# Patient Record
Sex: FEMALE | Race: ASIAN | NOT HISPANIC OR LATINO | Employment: OTHER | URBAN - METROPOLITAN AREA
[De-identification: names, ages, dates, MRNs, and addresses within clinical notes are randomized per-mention and may not be internally consistent; named-entity substitution may affect disease eponyms.]

---

## 2017-05-27 ENCOUNTER — HOSPITAL ENCOUNTER (INPATIENT)
Facility: MEDICAL CENTER | Age: 67
LOS: 11 days | DRG: 264 | End: 2017-06-07
Attending: EMERGENCY MEDICINE | Admitting: HOSPITALIST
Payer: MEDICARE

## 2017-05-27 ENCOUNTER — APPOINTMENT (OUTPATIENT)
Dept: RADIOLOGY | Facility: MEDICAL CENTER | Age: 67
DRG: 264 | End: 2017-05-27
Attending: INTERNAL MEDICINE
Payer: MEDICARE

## 2017-05-27 ENCOUNTER — APPOINTMENT (OUTPATIENT)
Dept: RADIOLOGY | Facility: MEDICAL CENTER | Age: 67
DRG: 264 | End: 2017-05-27
Attending: EMERGENCY MEDICINE
Payer: MEDICARE

## 2017-05-27 ENCOUNTER — RESOLUTE PROFESSIONAL BILLING HOSPITAL PROF FEE (OUTPATIENT)
Dept: HOSPITALIST | Facility: MEDICAL CENTER | Age: 67
End: 2017-05-27
Payer: MEDICARE

## 2017-05-27 DIAGNOSIS — I46.9 CARDIAC ARREST (HCC): ICD-10-CM

## 2017-05-27 LAB
ALBUMIN SERPL BCP-MCNC: 3.9 G/DL (ref 3.2–4.9)
ALBUMIN/GLOB SERPL: 1.1 G/DL
ALP SERPL-CCNC: 77 U/L (ref 30–99)
ALT SERPL-CCNC: 42 U/L (ref 2–50)
ANION GAP SERPL CALC-SCNC: 11 MMOL/L (ref 0–11.9)
ANION GAP SERPL CALC-SCNC: 8 MMOL/L (ref 0–11.9)
ANISOCYTOSIS BLD QL SMEAR: ABNORMAL
APTT PPP: 34.1 SEC (ref 24.7–36)
AST SERPL-CCNC: 51 U/L (ref 12–45)
BASE EXCESS BLDA CALC-SCNC: 1 MMOL/L (ref -4–3)
BASE EXCESS BLDA CALC-SCNC: 5 MMOL/L (ref -4–3)
BASOPHILS # BLD AUTO: 0 % (ref 0–1.8)
BASOPHILS # BLD: 0 K/UL (ref 0–0.12)
BILIRUB SERPL-MCNC: 0.6 MG/DL (ref 0.1–1.5)
BNP SERPL-MCNC: 988 PG/ML (ref 0–100)
BODY TEMPERATURE: ABNORMAL DEGREES
BODY TEMPERATURE: ABNORMAL DEGREES
BUN SERPL-MCNC: 22 MG/DL (ref 8–22)
BUN SERPL-MCNC: 30 MG/DL (ref 8–22)
BURR CELLS BLD QL SMEAR: NORMAL
CALCIUM SERPL-MCNC: 8.7 MG/DL (ref 8.5–10.5)
CALCIUM SERPL-MCNC: 9.4 MG/DL (ref 8.5–10.5)
CHLORIDE SERPL-SCNC: 102 MMOL/L (ref 96–112)
CHLORIDE SERPL-SCNC: 99 MMOL/L (ref 96–112)
CO2 BLDA-SCNC: 29 MMOL/L (ref 20–33)
CO2 BLDA-SCNC: 31 MMOL/L (ref 20–33)
CO2 SERPL-SCNC: 28 MMOL/L (ref 20–33)
CO2 SERPL-SCNC: 28 MMOL/L (ref 20–33)
CREAT SERPL-MCNC: 4.5 MG/DL (ref 0.5–1.4)
CREAT SERPL-MCNC: 4.74 MG/DL (ref 0.5–1.4)
DACRYOCYTES BLD QL SMEAR: NORMAL
EOSINOPHIL # BLD AUTO: 0.09 K/UL (ref 0–0.51)
EOSINOPHIL NFR BLD: 0.9 % (ref 0–6.9)
ERYTHROCYTE [DISTWIDTH] IN BLOOD BY AUTOMATED COUNT: 61.6 FL (ref 35.9–50)
GFR SERPL CREATININE-BSD FRML MDRD: 10 ML/MIN/1.73 M 2
GFR SERPL CREATININE-BSD FRML MDRD: 9 ML/MIN/1.73 M 2
GLOBULIN SER CALC-MCNC: 3.6 G/DL (ref 1.9–3.5)
GLUCOSE BLD-MCNC: 106 MG/DL (ref 65–99)
GLUCOSE BLD-MCNC: 134 MG/DL (ref 65–99)
GLUCOSE BLD-MCNC: 141 MG/DL (ref 65–99)
GLUCOSE SERPL-MCNC: 133 MG/DL (ref 65–99)
GLUCOSE SERPL-MCNC: 139 MG/DL (ref 65–99)
HAV IGM SERPL QL IA: NEGATIVE
HBV CORE IGM SER QL: NEGATIVE
HBV SURFACE AB SERPL IA-ACNC: 35.07 MIU/ML (ref 0–10)
HBV SURFACE AG SER QL: NEGATIVE
HCO3 BLDA-SCNC: 27.8 MMOL/L (ref 17–25)
HCO3 BLDA-SCNC: 29.6 MMOL/L (ref 17–25)
HCT VFR BLD AUTO: 36.2 % (ref 37–47)
HCV AB SER QL: NEGATIVE
HGB BLD-MCNC: 10.7 G/DL (ref 12–16)
INR PPP: 1.2 (ref 0.87–1.13)
LIPASE SERPL-CCNC: 36 U/L (ref 11–82)
LV EJECT FRACT  99904: 35
LV EJECT FRACT MOD 2C 99903: 35.67
LV EJECT FRACT MOD 4C 99902: 31.77
LV EJECT FRACT MOD BP 99901: 29.5
LYMPHOCYTES # BLD AUTO: 5.02 K/UL (ref 1–4.8)
LYMPHOCYTES NFR BLD: 47.8 % (ref 22–41)
MACROCYTES BLD QL SMEAR: ABNORMAL
MAGNESIUM SERPL-MCNC: 2.5 MG/DL (ref 1.5–2.5)
MANUAL DIFF BLD: NORMAL
MCH RBC QN AUTO: 27.7 PG (ref 27–33)
MCHC RBC AUTO-ENTMCNC: 29.6 G/DL (ref 33.6–35)
MCV RBC AUTO: 93.8 FL (ref 81.4–97.8)
MONOCYTES # BLD AUTO: 0.65 K/UL (ref 0–0.85)
MONOCYTES NFR BLD AUTO: 6.2 % (ref 0–13.4)
MORPHOLOGY BLD-IMP: NORMAL
MYELOCYTES NFR BLD MANUAL: 2.6 %
NEUTROPHILS # BLD AUTO: 4.46 K/UL (ref 2–7.15)
NEUTROPHILS NFR BLD: 40.7 % (ref 44–72)
NEUTS BAND NFR BLD MANUAL: 1.8 % (ref 0–10)
NRBC # BLD AUTO: 0.1 K/UL
NRBC BLD AUTO-RTO: 1 /100 WBC
O2/TOTAL GAS SETTING VFR VENT: 100 %
O2/TOTAL GAS SETTING VFR VENT: 40 %
OVALOCYTES BLD QL SMEAR: NORMAL
PCO2 BLDA: 43.1 MMHG (ref 26–37)
PCO2 BLDA: 55.8 MMHG (ref 26–37)
PCO2 TEMP ADJ BLDA: 43.1 MMHG (ref 26–37)
PH BLDA: 7.3 [PH] (ref 7.4–7.5)
PH BLDA: 7.45 [PH] (ref 7.4–7.5)
PH TEMP ADJ BLDA: 7.45 [PH] (ref 7.4–7.5)
PLATELET # BLD AUTO: 92 K/UL (ref 164–446)
PLATELET BLD QL SMEAR: NORMAL
PMV BLD AUTO: 11.5 FL (ref 9–12.9)
PO2 BLDA: 340 MMHG (ref 64–87)
PO2 BLDA: 60 MMHG (ref 64–87)
PO2 TEMP ADJ BLDA: 60 MMHG (ref 64–87)
POIKILOCYTOSIS BLD QL SMEAR: NORMAL
POTASSIUM SERPL-SCNC: 4.2 MMOL/L (ref 3.6–5.5)
POTASSIUM SERPL-SCNC: 4.3 MMOL/L (ref 3.6–5.5)
PROT SERPL-MCNC: 7.5 G/DL (ref 6–8.2)
PROTHROMBIN TIME: 15.6 SEC (ref 12–14.6)
RBC # BLD AUTO: 3.86 M/UL (ref 4.2–5.4)
RBC BLD AUTO: PRESENT
SAO2 % BLDA: 100 % (ref 93–99)
SAO2 % BLDA: 91 % (ref 93–99)
SCHISTOCYTES BLD QL SMEAR: NORMAL
SODIUM SERPL-SCNC: 138 MMOL/L (ref 135–145)
SODIUM SERPL-SCNC: 138 MMOL/L (ref 135–145)
SPECIMEN DRAWN FROM PATIENT: ABNORMAL
SPECIMEN DRAWN FROM PATIENT: ABNORMAL
T4 FREE SERPL-MCNC: 0.9 NG/DL (ref 0.53–1.43)
TROPONIN I SERPL-MCNC: 0.05 NG/ML (ref 0–0.04)
TROPONIN I SERPL-MCNC: 0.61 NG/ML (ref 0–0.04)
TROPONIN I SERPL-MCNC: 0.93 NG/ML (ref 0–0.04)
TROPONIN I SERPL-MCNC: 1.67 NG/ML (ref 0–0.04)
WBC # BLD AUTO: 10.5 K/UL (ref 4.8–10.8)

## 2017-05-27 PROCEDURE — 82803 BLOOD GASES ANY COMBINATION: CPT

## 2017-05-27 PROCEDURE — 93306 TTE W/DOPPLER COMPLETE: CPT | Mod: 26 | Performed by: INTERNAL MEDICINE

## 2017-05-27 PROCEDURE — 96365 THER/PROPH/DIAG IV INF INIT: CPT

## 2017-05-27 PROCEDURE — 83735 ASSAY OF MAGNESIUM: CPT

## 2017-05-27 PROCEDURE — 84439 ASSAY OF FREE THYROXINE: CPT

## 2017-05-27 PROCEDURE — 94760 N-INVAS EAR/PLS OXIMETRY 1: CPT

## 2017-05-27 PROCEDURE — 94002 VENT MGMT INPAT INIT DAY: CPT

## 2017-05-27 PROCEDURE — C1751 CATH, INF, PER/CENT/MIDLINE: HCPCS

## 2017-05-27 PROCEDURE — 770022 HCHG ROOM/CARE - ICU (200)

## 2017-05-27 PROCEDURE — 90935 HEMODIALYSIS ONE EVALUATION: CPT

## 2017-05-27 PROCEDURE — 302214 INTUBATION BOX: Performed by: EMERGENCY MEDICINE

## 2017-05-27 PROCEDURE — 700105 HCHG RX REV CODE 258: Performed by: INTERNAL MEDICINE

## 2017-05-27 PROCEDURE — 93005 ELECTROCARDIOGRAM TRACING: CPT | Performed by: EMERGENCY MEDICINE

## 2017-05-27 PROCEDURE — 80061 LIPID PANEL: CPT

## 2017-05-27 PROCEDURE — 80053 COMPREHEN METABOLIC PANEL: CPT

## 2017-05-27 PROCEDURE — 83880 ASSAY OF NATRIURETIC PEPTIDE: CPT

## 2017-05-27 PROCEDURE — 36556 INSERT NON-TUNNEL CV CATH: CPT

## 2017-05-27 PROCEDURE — 99292 CRITICAL CARE ADDL 30 MIN: CPT | Mod: 25 | Performed by: INTERNAL MEDICINE

## 2017-05-27 PROCEDURE — 85610 PROTHROMBIN TIME: CPT

## 2017-05-27 PROCEDURE — 700111 HCHG RX REV CODE 636 W/ 250 OVERRIDE (IP): Performed by: INTERNAL MEDICINE

## 2017-05-27 PROCEDURE — 96366 THER/PROPH/DIAG IV INF ADDON: CPT

## 2017-05-27 PROCEDURE — 5A1D60Z PERFORMANCE OF URINARY FILTRATION, MULTIPLE: ICD-10-PCS | Performed by: INTERNAL MEDICINE

## 2017-05-27 PROCEDURE — 02HV33Z INSERTION OF INFUSION DEVICE INTO SUPERIOR VENA CAVA, PERCUTANEOUS APPROACH: ICD-10-PCS | Performed by: INTERNAL MEDICINE

## 2017-05-27 PROCEDURE — 83690 ASSAY OF LIPASE: CPT

## 2017-05-27 PROCEDURE — 700111 HCHG RX REV CODE 636 W/ 250 OVERRIDE (IP)

## 2017-05-27 PROCEDURE — 85007 BL SMEAR W/DIFF WBC COUNT: CPT

## 2017-05-27 PROCEDURE — 304538 HCHG NG TUBE

## 2017-05-27 PROCEDURE — 93306 TTE W/DOPPLER COMPLETE: CPT

## 2017-05-27 PROCEDURE — 71010 DX-CHEST-PORTABLE (1 VIEW): CPT

## 2017-05-27 PROCEDURE — 85730 THROMBOPLASTIN TIME PARTIAL: CPT

## 2017-05-27 PROCEDURE — C1751 CATH, INF, PER/CENT/MIDLINE: HCPCS | Performed by: INTERNAL MEDICINE

## 2017-05-27 PROCEDURE — 85027 COMPLETE CBC AUTOMATED: CPT

## 2017-05-27 PROCEDURE — 82962 GLUCOSE BLOOD TEST: CPT | Mod: 91

## 2017-05-27 PROCEDURE — 99223 1ST HOSP IP/OBS HIGH 75: CPT | Performed by: HOSPITALIST

## 2017-05-27 PROCEDURE — 5A1955Z RESPIRATORY VENTILATION, GREATER THAN 96 CONSECUTIVE HOURS: ICD-10-PCS | Performed by: EMERGENCY MEDICINE

## 2017-05-27 PROCEDURE — 80048 BASIC METABOLIC PNL TOTAL CA: CPT

## 2017-05-27 PROCEDURE — 36556 INSERT NON-TUNNEL CV CATH: CPT | Performed by: INTERNAL MEDICINE

## 2017-05-27 PROCEDURE — 86706 HEP B SURFACE ANTIBODY: CPT

## 2017-05-27 PROCEDURE — 303105 HCHG CATHETER EXTRA

## 2017-05-27 PROCEDURE — 70450 CT HEAD/BRAIN W/O DYE: CPT

## 2017-05-27 PROCEDURE — 80074 ACUTE HEPATITIS PANEL: CPT

## 2017-05-27 PROCEDURE — 31500 INSERT EMERGENCY AIRWAY: CPT

## 2017-05-27 PROCEDURE — 0BH18EZ INSERTION OF ENDOTRACHEAL AIRWAY INTO TRACHEA, VIA NATURAL OR ARTIFICIAL OPENING ENDOSCOPIC: ICD-10-PCS | Performed by: EMERGENCY MEDICINE

## 2017-05-27 PROCEDURE — 99291 CRITICAL CARE FIRST HOUR: CPT

## 2017-05-27 PROCEDURE — 84484 ASSAY OF TROPONIN QUANT: CPT

## 2017-05-27 PROCEDURE — 51702 INSERT TEMP BLADDER CATH: CPT

## 2017-05-27 PROCEDURE — 700105 HCHG RX REV CODE 258: Performed by: EMERGENCY MEDICINE

## 2017-05-27 PROCEDURE — 99291 CRITICAL CARE FIRST HOUR: CPT | Mod: 25 | Performed by: INTERNAL MEDICINE

## 2017-05-27 PROCEDURE — 36600 WITHDRAWAL OF ARTERIAL BLOOD: CPT

## 2017-05-27 PROCEDURE — B548ZZA ULTRASONOGRAPHY OF SUPERIOR VENA CAVA, GUIDANCE: ICD-10-PCS | Performed by: INTERNAL MEDICINE

## 2017-05-27 PROCEDURE — A9270 NON-COVERED ITEM OR SERVICE: HCPCS | Performed by: INTERNAL MEDICINE

## 2017-05-27 PROCEDURE — 700102 HCHG RX REV CODE 250 W/ 637 OVERRIDE(OP): Performed by: INTERNAL MEDICINE

## 2017-05-27 RX ORDER — LABETALOL HYDROCHLORIDE 5 MG/ML
10 INJECTION, SOLUTION INTRAVENOUS EVERY 4 HOURS PRN
Status: DISCONTINUED | OUTPATIENT
Start: 2017-05-27 | End: 2017-06-07 | Stop reason: HOSPADM

## 2017-05-27 RX ORDER — AMOXICILLIN 250 MG
2 CAPSULE ORAL 2 TIMES DAILY
Status: DISCONTINUED | OUTPATIENT
Start: 2017-05-27 | End: 2017-05-27

## 2017-05-27 RX ORDER — ALBUMIN (HUMAN) 12.5 G/50ML
12.5 SOLUTION INTRAVENOUS
Status: DISCONTINUED | OUTPATIENT
Start: 2017-05-27 | End: 2017-05-30

## 2017-05-27 RX ORDER — LIDOCAINE HYDROCHLORIDE 10 MG/ML
1-2 INJECTION, SOLUTION INFILTRATION; PERINEURAL
Status: DISCONTINUED | OUTPATIENT
Start: 2017-05-27 | End: 2017-06-07 | Stop reason: HOSPADM

## 2017-05-27 RX ORDER — AMOXICILLIN 250 MG
2 CAPSULE ORAL 2 TIMES DAILY
Status: DISCONTINUED | OUTPATIENT
Start: 2017-05-27 | End: 2017-06-07 | Stop reason: HOSPADM

## 2017-05-27 RX ORDER — CHLORHEXIDINE GLUCONATE ORAL RINSE 1.2 MG/ML
15 SOLUTION DENTAL 2 TIMES DAILY
Status: DISCONTINUED | OUTPATIENT
Start: 2017-05-27 | End: 2017-06-02

## 2017-05-27 RX ORDER — SODIUM CHLORIDE 9 MG/ML
INJECTION, SOLUTION INTRAVENOUS CONTINUOUS
Status: DISCONTINUED | OUTPATIENT
Start: 2017-05-27 | End: 2017-06-07 | Stop reason: HOSPADM

## 2017-05-27 RX ORDER — PRAVASTATIN SODIUM 40 MG
40 TABLET ORAL NIGHTLY
COMMUNITY

## 2017-05-27 RX ORDER — HEPARIN SODIUM 5000 [USP'U]/ML
5000 INJECTION, SOLUTION INTRAVENOUS; SUBCUTANEOUS EVERY 8 HOURS
Status: DISCONTINUED | OUTPATIENT
Start: 2017-05-27 | End: 2017-05-27

## 2017-05-27 RX ORDER — ENALAPRILAT 1.25 MG/ML
1.25 INJECTION INTRAVENOUS EVERY 6 HOURS PRN
Status: DISCONTINUED | OUTPATIENT
Start: 2017-05-27 | End: 2017-06-07 | Stop reason: HOSPADM

## 2017-05-27 RX ORDER — OMEPRAZOLE 20 MG/1
20 CAPSULE, DELAYED RELEASE ORAL DAILY
COMMUNITY

## 2017-05-27 RX ORDER — DEXTROSE MONOHYDRATE 25 G/50ML
25 INJECTION, SOLUTION INTRAVENOUS
Status: DISCONTINUED | OUTPATIENT
Start: 2017-05-27 | End: 2017-05-27

## 2017-05-27 RX ORDER — SEVELAMER HYDROCHLORIDE 800 MG/1
800 TABLET, FILM COATED ORAL
COMMUNITY

## 2017-05-27 RX ORDER — BISACODYL 10 MG
10 SUPPOSITORY, RECTAL RECTAL
Status: DISCONTINUED | OUTPATIENT
Start: 2017-05-27 | End: 2017-06-07 | Stop reason: HOSPADM

## 2017-05-27 RX ORDER — FAMOTIDINE 20 MG/1
20 TABLET, FILM COATED ORAL DAILY
Status: DISCONTINUED | OUTPATIENT
Start: 2017-05-27 | End: 2017-06-03

## 2017-05-27 RX ORDER — FUROSEMIDE 40 MG/1
40 TABLET ORAL DAILY
COMMUNITY

## 2017-05-27 RX ORDER — AMLODIPINE BESYLATE 5 MG/1
5 TABLET ORAL
COMMUNITY

## 2017-05-27 RX ORDER — SODIUM CHLORIDE 9 MG/ML
1000 INJECTION, SOLUTION INTRAVENOUS ONCE
Status: COMPLETED | OUTPATIENT
Start: 2017-05-27 | End: 2017-05-27

## 2017-05-27 RX ORDER — HEPARIN SODIUM 5000 [USP'U]/ML
5000 INJECTION, SOLUTION INTRAVENOUS; SUBCUTANEOUS EVERY 8 HOURS
Status: DISCONTINUED | OUTPATIENT
Start: 2017-05-27 | End: 2017-05-29

## 2017-05-27 RX ORDER — MULTIVIT-MIN/IRON/FOLIC ACID/K 18-600-40
1 CAPSULE ORAL DAILY
COMMUNITY

## 2017-05-27 RX ORDER — IPRATROPIUM BROMIDE AND ALBUTEROL SULFATE 2.5; .5 MG/3ML; MG/3ML
3 SOLUTION RESPIRATORY (INHALATION)
Status: DISCONTINUED | OUTPATIENT
Start: 2017-05-27 | End: 2017-06-07 | Stop reason: HOSPADM

## 2017-05-27 RX ORDER — ONDANSETRON 4 MG/1
4 TABLET, ORALLY DISINTEGRATING ORAL EVERY 4 HOURS PRN
Status: DISCONTINUED | OUTPATIENT
Start: 2017-05-27 | End: 2017-06-07 | Stop reason: HOSPADM

## 2017-05-27 RX ORDER — DEXTROSE MONOHYDRATE 25 G/50ML
25 INJECTION, SOLUTION INTRAVENOUS
Status: DISCONTINUED | OUTPATIENT
Start: 2017-05-27 | End: 2017-06-07 | Stop reason: HOSPADM

## 2017-05-27 RX ORDER — ACETAMINOPHEN 325 MG/1
650 TABLET ORAL EVERY 6 HOURS PRN
Status: DISCONTINUED | OUTPATIENT
Start: 2017-05-27 | End: 2017-06-07 | Stop reason: HOSPADM

## 2017-05-27 RX ORDER — POLYETHYLENE GLYCOL 3350 17 G/17G
1 POWDER, FOR SOLUTION ORAL
Status: DISCONTINUED | OUTPATIENT
Start: 2017-05-27 | End: 2017-05-27

## 2017-05-27 RX ORDER — ONDANSETRON 2 MG/ML
4 INJECTION INTRAMUSCULAR; INTRAVENOUS EVERY 4 HOURS PRN
Status: DISCONTINUED | OUTPATIENT
Start: 2017-05-27 | End: 2017-06-07 | Stop reason: HOSPADM

## 2017-05-27 RX ORDER — POLYETHYLENE GLYCOL 3350 17 G/17G
1 POWDER, FOR SOLUTION ORAL
Status: DISCONTINUED | OUTPATIENT
Start: 2017-05-27 | End: 2017-06-07 | Stop reason: HOSPADM

## 2017-05-27 RX ORDER — INSULIN GLARGINE 100 [IU]/ML
29 INJECTION, SOLUTION SUBCUTANEOUS EVERY MORNING
COMMUNITY

## 2017-05-27 RX ORDER — CARVEDILOL 12.5 MG/1
12.5 TABLET ORAL 2 TIMES DAILY WITH MEALS
COMMUNITY

## 2017-05-27 RX ORDER — BISACODYL 10 MG
10 SUPPOSITORY, RECTAL RECTAL
Status: DISCONTINUED | OUTPATIENT
Start: 2017-05-27 | End: 2017-05-27

## 2017-05-27 RX ADMIN — HEPARIN SODIUM 5000 UNITS: 5000 INJECTION, SOLUTION INTRAVENOUS; SUBCUTANEOUS at 21:35

## 2017-05-27 RX ADMIN — FENTANYL CITRATE 50 MCG: 50 INJECTION, SOLUTION INTRAMUSCULAR; INTRAVENOUS at 06:37

## 2017-05-27 RX ADMIN — SODIUM CHLORIDE 1000 ML: 9 INJECTION, SOLUTION INTRAVENOUS at 01:35

## 2017-05-27 RX ADMIN — CHLORHEXIDINE GLUCONATE 15 ML: 1.2 RINSE ORAL at 07:50

## 2017-05-27 RX ADMIN — CHLORHEXIDINE GLUCONATE 15 ML: 1.2 RINSE ORAL at 21:31

## 2017-05-27 RX ADMIN — SODIUM CHLORIDE: 9 INJECTION, SOLUTION INTRAVENOUS at 03:54

## 2017-05-27 RX ADMIN — PROPOFOL 35 MCG/KG/MIN: 10 INJECTION, EMULSION INTRAVENOUS at 07:51

## 2017-05-27 RX ADMIN — PROPOFOL 10 MCG/KG/MIN: 10 INJECTION, EMULSION INTRAVENOUS at 01:30

## 2017-05-27 RX ADMIN — HEPARIN SODIUM 5000 UNITS: 5000 INJECTION, SOLUTION INTRAVENOUS; SUBCUTANEOUS at 15:28

## 2017-05-27 RX ADMIN — HEPARIN SODIUM 5000 UNITS: 5000 INJECTION, SOLUTION INTRAVENOUS; SUBCUTANEOUS at 06:20

## 2017-05-27 RX ADMIN — FENTANYL CITRATE 50 MCG: 50 INJECTION, SOLUTION INTRAMUSCULAR; INTRAVENOUS at 08:09

## 2017-05-27 RX ADMIN — PROPOFOL 30 MCG/KG/MIN: 10 INJECTION, EMULSION INTRAVENOUS at 15:28

## 2017-05-27 RX ADMIN — PROPOFOL 30 MCG/KG/MIN: 10 INJECTION, EMULSION INTRAVENOUS at 23:15

## 2017-05-27 RX ADMIN — FENTANYL CITRATE 100 MCG: 50 INJECTION, SOLUTION INTRAMUSCULAR; INTRAVENOUS at 21:01

## 2017-05-27 ASSESSMENT — ENCOUNTER SYMPTOMS: LOSS OF CONSCIOUSNESS: 1

## 2017-05-27 ASSESSMENT — LIFESTYLE VARIABLES
EVER_SMOKED: NEVER
REASON UNABLE TO ASSESS: INTUBATED AND SEDATED
ALCOHOL_USE: NO
DO YOU DRINK ALCOHOL: NO

## 2017-05-27 NOTE — DIETARY
"Nutrition Support (cortrak) Assessment - Female    Tyler Thompson is a 66 y.o. female with admitting DX of Cardiac arrest  Per chart review: pt noted with ESRD and on HD 3x/wk for the past 7 years   No past surgical history on file.    Allergies: Pcn  Height: 157.5 cm (5' 2\")  Weight: 71.8 kg (158 lb 4.6 oz)  Weight to Use in Calculations: 71.8 kg (158 lb 4.6 oz)  Ideal Body Weight: 49.896 kg (110 lb)  Percent Ideal Body Weight: 143.9  Body mass index is 28.94 kg/(m^2).    Pertinent Labs: Gluc 139, BUN 22, creat 4.5, K/Phos/Mg WNL  Pertinent Medications: Pepcid, Albumin (prn w/HD) Heparin, Insulin, Propofol (currenlty at 13.1 mL/hr = 345 kcals); bowel meds prn   Pertinent Fluids: IVF NS at 10 mL/hr cont   Skin: no skin breakdown noted at this time     Estimated Needs: MSJ x 1.2 = 1443 kcals; PSU 2003b = 1130 kcals (Ve: 5.1, Tmax/24 hours = 36.1C)   Total Calories / day: 1075 - 1440 (Calories / kg: 15 - 20)  Total Grams Protein / day: 86.3 - 100 (Grams Protein / k.2 - 1.4)  Total Fluids ml / day: 1798.2 ml        Assessment / Evaluation: Pt admitted with cardiac arrest, she is currently intubated and sedated. Received orders for nutrition support to be initiated. While pt is on propofol, will provide specialized TF formula, Peptamen Intense VHP, to help provide adequate kcals and protein.  Once pt is no longer on propofol, will change TF formula to Replete with Fiber as TF meets estimated needs.     Plan / Recommendation:   · Once cortrak is placed and verified, initiate nutrition support:   · ON propofol: Provide Peptamen Intense VHP at 25 mL/hr and advance per protocol to goal rate of 40 mL/hr .  TF at goal will provide 960 kcals (+kcals from prop), 90 gm protein and 806 mL free water per day   · OFF propofol: Change TF to Replete with Fiber and advance as pt is able to goal rate of 60 mL/hr.  TF at goal will provide 1440 kcals, 90 gm protein, 1210 mL free water, 1525 mg Na, 1440 mg Phos and 2160 mg K+    · Fluids " per MD   · Monitor wt and lab trends   · RD will con't to monitor sedation and TF tolerance and will adjust TF as necessary     RD following

## 2017-05-27 NOTE — IP AVS SNAPSHOT
" Home Care Instructions                                                                                                                  Name:Tyler Thmopson  Medical Record Number:[id number]  CSN: [id number]    YOB: 1950   Age: 66 y.o.  Sex: female  HT:1.575 m (5' 2\") WT: 54 kg (119 lb 0.8 oz)          Admit Date: 5/27/2017     Discharge Date:   Today's Date: 6/7/2017  Attending Doctor:  Ricardo Castaneda M.D.                  Allergies:  Pcn            Discharge Instructions       Discharge Instructions    Discharged to other by ambulance with escort. Discharged via ambulance, hospital escort: Yes.  Special equipment needed: Oxygen    Be sure to schedule a follow-up appointment with your primary care doctor or any specialists as instructed.     Discharge Plan:   Diet Plan: Discussed  Activity Level: Discussed  Confirmed Follow up Appointment:  (discharge to LT[facility])  Confirmed Symptoms Management: Discussed  Medication Reconciliation Updated: Yes  Influenza Vaccine Indication: Indicated: Not available from distributor/, Patient Refuses    I understand that a diet low in cholesterol, fat, and sodium is recommended for good health. Unless I have been given specific instructions below for another diet, I accept this instruction as my diet prescription.   Other diet: tube feed/ to be followed up on    Special Instructions:   HF Patient Discharge Instructions  · Monitor your weight daily, and maintain a weight chart, to track your weight changes.   · Activity as tolerated, unless your Doctor has ordered otherwise. Other activity order: as tolerated.  · Follow a low fat, low cholesterol, low salt diet unless instructed otherwise by your Doctor. Read the labels on the back of food products and track your intake of fat, cholesterol and salt.   · Fluid Restriction No. If a Fluid Restriction has been ordered by your Doctor, measure fluids with a measuring cup to ensure that you are not exceeding the restriction. "   · No smoking.  · Oxygen Yes. If your Doctor has ordered that you wear Oxygen at home, it is important to wear it as ordered.  · Did you receive an explanation from staff on the importance of taking each of your medications and why it is necessary to keep taking them unless your doctor says to stop?   · Were all of your questions answered about how to manage your heart failure and what to do if you have increased signs and symptoms after you go home?   · Do you feel like your heart failure care team involved you in the care treatment plan and allowed you to make decisions regarding your care while in the hospital and addressed any discharge needs you might have?     See the educational handout provided at discharge for more information on monitoring your daily weight, activity and diet. This also explains more about Heart Failure, symptoms of a flare-up and some of the tests that you have undergone.     Warning Signs of a Flare-Up include:  · Swelling in the ankles or lower legs.  · Shortness of breath, while at rest, or while doing normal activities.   · Shortness of breath at night when in bed, or coughing in bed.   · Requiring more pillows to sleep at night, or needing to sit up at night to sleep.  · Feeling weak, dizzy or fatigued.     When to call your Doctor:  · [facility] week from 8:00 a.m. to 8:00 p.m. for medical questions [phone].  · Call your Primary Care Physician or Cardiologist if:   1. You experience any pain radiating to your jaw or neck.  2. You have any difficulty breathing.  3. You experience weight gain of 3 lbs in a day or 5 lbs in a week.   4. You feel any palpitations or irregular heartbeats.  5. You become dizzy or lose consciousness.   If you have had an angiogram or had a pacemaker or AICD placed, and experience:  1. Bleeding, drainage or swelling at the surgical / puncture site.  2. Fever greater than 100.0 F  3. Shock from internal  "defibrillator.  4. Cool and / or numb extremities.      · Is patient discharged on Warfarin / Coumadin?   Yes    You are receiving the drug warfarin. Please understand the importance of monitoring warfarin with scheduled PT/INR blood draws.  Follow-up with LTAC .    IMPORTANT: HOW TO USE THIS INFORMATION:  This is a summary and does NOT have all possible information about this product. This information does not assure that this product is safe, effective, or appropriate for you. This information is not individual medical advice and does not substitute for the advice of your health care professional. Always ask your health care professional for complete information about this product and your specific health needs.      WARFARIN - ORAL (WARF-uh-rin)      COMMON BRAND NAME(S): Coumadin      WARNING:  Warfarin can cause very serious (possibly fatal) bleeding. This is more likely to occur when you first start taking this medication or if you take too much warfarin. To decrease your risk for bleeding, your doctor or other health care provider will monitor you closely and check your lab results (INR test) to make sure you are not taking too much warfarin. Keep all medical and laboratory appointments. Tell your doctor right away if you notice any signs of serious bleeding. See also Side Effects section.      USES:  This medication is used to treat blood clots (such as in deep vein thrombosis-DVT or pulmonary embolus-PE) and/or to prevent new clots from forming in your body. Preventing harmful blood clots helps to reduce the risk of a stroke or heart attack. Conditions that increase your risk of developing blood clots include a certain type of irregular heart rhythm (atrial fibrillation), heart valve replacement, recent heart attack, and certain surgeries (such as hip/knee replacement). Warfarin is commonly called a \"blood thinner,\" but the more correct term is \"anticoagulant.\" It helps to keep blood flowing smoothly in " your body by decreasing the amount of certain substances (clotting proteins) in your blood.      HOW TO USE:  Read the Medication Guide provided by your pharmacist before you start taking warfarin and each time you get a refill. If you have any questions, ask your doctor or pharmacist. Take this medication by mouth with or without food as directed by your doctor or other health care professional, usually once a day. It is very important to take it exactly as directed. Do not increase the dose, take it more frequently, or stop using it unless directed by your doctor. Dosage is based on your medical condition, laboratory tests (such as INR), and response to treatment. Your doctor or other health care provider will monitor you closely while you are taking this medication to determine the right dose for you. Use this medication regularly to get the most benefit from it. To help you remember, take it at the same time each day. It is important to eat a balanced, consistent diet while taking warfarin. Some foods can affect how warfarin works in your body and may affect your treatment and dose. Avoid sudden large increases or decreases in your intake of foods high in vitamin K (such as broccoli, cauliflower, cabbage, brussels sprouts, kale, spinach, and other green leafy vegetables, liver, green tea, certain vitamin supplements). If you are trying to lose weight, check with your doctor before you try to go on a diet. Cranberry products may also affect how your warfarin works. Limit the amount of cranberry juice (16 ounces/480 milliliters a day) or other cranberry products you may drink or eat.      SIDE EFFECTS:  Nausea, loss of appetite, or stomach/abdominal pain may occur. If any of these effects persist or worsen, tell your doctor or pharmacist promptly. Remember that your doctor has prescribed this medication because he or she has judged that the benefit to you is greater than the risk of side effects. Many people using  this medication do not have serious side effects. This medication can cause serious bleeding if it affects your blood clotting proteins too much (shown by unusually high INR lab results). Even if your doctor stops your medication, this risk of bleeding can continue for up to a week. Tell your doctor right away if you have any signs of serious bleeding, including: unusual pain/swelling/discomfort, unusual/easy bruising, prolonged bleeding from cuts or gums, persistent/frequent nosebleeds, unusually heavy/prolonged menstrual flow, pink/dark urine, coughing up blood, vomit that is bloody or looks like coffee grounds, severe headache, dizziness/fainting, unusual or persistent tiredness/weakness, bloody/black/tarry stools, chest pain, shortness of breath, difficulty swallowing. Tell your doctor right away if any of these unlikely but serious side effects occur: persistent nausea/vomiting, severe stomach/abdominal pain, yellowing eyes/skin. This drug rarely has caused very serious (possibly fatal) problems if its effects lead to small blood clots (usually at the beginning of treatment). This can lead to severe skin/tissue damage that may require surgery or amputation if left untreated. Patients with certain blood conditions (protein C or S deficiency) may be at greater risk. Get medical help right away if any of these rare but serious side effects occur: painful/red/purplish patches on the skin (such as on the toe, breast, abdomen), change in the amount of urine, vision changes, confusion, slurred speech, weakness on one side of the body. A very serious allergic reaction to this drug is rare. However, get medical help right away if you notice any symptoms of a serious allergic reaction, including: rash, itching/swelling (especially of the face/tongue/throat), severe dizziness, trouble breathing. This is not a complete list of possible side effects. If you notice other effects not listed above, contact your doctor or  pharmacist. In the [place] - Call your doctor for medical advice about side effects. You may report side effects to FDA at 0-808-JIQ-4759. In [place] - Call your doctor for medical advice about side effects. You may report side effects to [facility] [place] at [phone].      PRECAUTIONS:  Before taking warfarin, tell your doctor or pharmacist if you are allergic to it; or if you have any other allergies. This product may contain inactive ingredients, which can cause allergic reactions or other problems. Talk to your pharmacist for more details. Before using this medication, tell your doctor or pharmacist your medical history, especially of: blood disorders (such as anemia, hemophilia), bleeding problems (such as bleeding of the stomach/intestines, bleeding in the brain), blood vessel disorders (such as aneurysms), recent major injury/surgery, liver disease, alcohol use, mental/mood disorders (including memory problems), frequent falls/injuries. It is important that all your doctors and dentists know that you take warfarin. Before having surgery or any medical/dental procedures, tell your doctor or dentist that you are taking this medication and about all the products you use (including prescription drugs, nonprescription drugs, and herbal products). Avoid getting injections into the muscles. If you must have an injection into a muscle (for example, a flu shot), it should be given in the arm. This way, it will be easier to check for bleeding and/or apply pressure bandages. This medication may cause stomach bleeding. Daily use of alcohol while using this medicine will increase your risk for stomach bleeding and may also affect how this medication works. Limit or avoid alcoholic beverages. If you have not been eating well, if you have an illness or infection that causes fever, vomiting, or diarrhea for more than 2 days, or if you start using any antibiotic medications, contact your doctor or pharmacist immediately because  these conditions can affect how warfarin works. This medication can cause heavy bleeding. To lower the chance of getting cut, bruised, or injured, use great caution with sharp objects like safety razors and nail cutters. Use an electric razor when shaving and a soft toothbrush when brushing your teeth. Avoid activities such as contact sports. If you fall or injure yourself, especially if you hit your head, call your doctor immediately. Your doctor may need to check you. The Food & Drug Administration has stated that generic warfarin products are interchangeable. However, consult your doctor or pharmacist before switching warfarin products. Be careful not to take more than one medication that contains warfarin unless specifically directed by the doctor or health care provider who is monitoring your warfarin treatment. Older adults may be at greater risk for bleeding while using this drug. This medication is not recommended for use during pregnancy because of serious (possibly fatal) harm to an unborn baby. Discuss the use of reliable forms of birth control with your doctor. If you become pregnant or think you may be pregnant, tell your doctor immediately. If you are planning pregnancy, discuss a plan for managing your condition with your doctor before you become pregnant. Your doctor may switch the type of medication you use during pregnancy. Very small amounts of this medication may pass into breast milk but is unlikely to harm a nursing infant. Consult your doctor before breast-feeding.      DRUG INTERACTIONS:  Drug interactions may change how your medications work or increase your risk for serious side effects. This document does not contain all possible drug interactions. Keep a list of all the products you use (including prescription/nonprescription drugs and herbal products) and share it with your doctor and pharmacist. Do not start, stop, or change the dosage of any medicines without your doctor's approval.  "Warfarin interacts with many prescription, nonprescription, vitamin, and herbal products. This includes medications that are applied to the skin or inside the vagina or rectum. The interactions with warfarin usually result in an increase or decrease in the \"blood-thinning\" (anticoagulant) effect. Your doctor or other health care professional should closely monitor you to prevent serious bleeding or clotting problems. While taking warfarin, it is very important to tell your doctor or pharmacist of any changes in medications, vitamins, or herbal products that you are taking. Some products that may interact with this drug include: capecitabine, imatinib, mifepristone. Aspirin, aspirin-like drugs (salicylates), and nonsteroidal anti-inflammatory drugs (NSAIDs such as ibuprofen, naproxen, celecoxib) may have effects similar to warfarin. These drugs may increase the risk of bleeding problems if taken during treatment with warfarin. Carefully check all prescription/nonprescription product labels (including drugs applied to the skin such as pain-relieving creams) since the products may contain NSAIDs or salicylates. Talk to your doctor about using a different medication (such as acetaminophen) to treat pain/fever. Low-dose aspirin and related drugs (such as clopidogrel, ticlopidine) should be continued if prescribed by your doctor for specific medical reasons such as heart attack or stroke prevention. Consult your doctor or pharmacist for more details. Many herbal products interact with warfarin. Tell your doctor before taking any herbal products, especially bromelains, coenzyme Q10, cranberry, danshen, dong quai, fenugreek, garlic, ginkgo biloba, ginseng, and Ayo's wort, among others. This medication may interfere with a certain laboratory test to measure theophylline levels, possibly causing false test results. Make sure laboratory personnel and all your doctors know you use this drug.      OVERDOSE:  If overdose is " suspected, contact a poison control center or emergency room immediately. [place] residents can call the [place] National Poison Hotline at 1-336.835.5428. [place] residents can call a provincial poison control center. Symptoms of overdose may include: bloody/black/tarry stools, pink/dark urine, unusual/prolonged bleeding.      NOTES:  Do not share this medication with others. Laboratory and/or medical tests (such as INR, complete blood count) must be performed periodically to monitor your progress or check for side effects. Consult your doctor for more details.      MISSED DOSE:  For the best possible benefit, do not miss any doses. If you do miss a dose and remember on the same day, take it as soon as you remember. If you remember on the next day, skip the missed dose and resume your usual dosing schedule. Do not double the dose to catch up because this could increase your risk for bleeding. Keep a record of missed doses to give to your doctor or pharmacist. Contact your doctor or pharmacist if you miss 2 or more doses in a row.      STORAGE:  Store at room temperature away from light and moisture. Do not store in the bathroom. Keep all medications away from children and pets. Do not flush medications down the toilet or pour them into a drain unless instructed to do so. Properly discard this product when it is  or no longer needed. Consult your pharmacist or local waste disposal company for more details about how to safely discard your product.      MEDICAL ALERT:  Your condition and medication can cause complications in a medical emergency. For information about enrolling in MedicAlert, call 1-316.233.8395 ([place]) or 1-707.766.9938 ([place]).      Information last revised 2010 Copyright(c) 2010 [employer]             · Is patient Post Blood Transfusion?  No    Depression / Suicide Risk    As you are discharged from this [facility], it is important to learn how to keep safe from harming  yourself.    Recognize the warning signs:  · Abrupt changes in personality, positive or negative- including increase in energy   · Giving away possessions  · Change in eating patterns- significant weight changes-  positive or negative  · Change in sleeping patterns- unable to sleep or sleeping all the time   · Unwillingness or inability to communicate  · Depression  · Unusual sadness, discouragement and loneliness  · Talk of wanting to die  · Neglect of personal appearance   · Rebelliousness- reckless behavior  · Withdrawal from people/activities they love  · Confusion- inability to concentrate     If you or a loved one observes any of these behaviors or has concerns about self-harm, here's what you can do:  · Talk about it- your feelings and reasons for harming yourself  · Remove any means that you might use to hurt yourself (examples: pills, rope, extension cords, firearm)  · Get professional help from the community (Mental Health, Substance Abuse, psychological counseling)  · Do not be alone:Call your Safe Contact- someone whom you trust who will be there for you.  · Call your local CRISIS HOTLINE 986-3635 or 361-865-8844  · Call your local Children's Mobile Crisis Response Team [place] (694) 191-2449 or www.[employer]  · Call the toll free National Suicide Prevention Hotlines   · National Suicide Prevention Lifeline 762-663-PECB (6297)  · National Hope Line Network 800-SUICIDE (184-9946)           Discharge Medication Instructions:    Below are the medications your physician expects you to take upon discharge:    Review all your home medications and newly ordered medications with your doctor and/or pharmacist. Follow medication instructions as directed by your doctor and/or pharmacist.    Please keep your medication list with you and share with your physician.               Medication List      ASK your doctor about these medications        Instructions    Morning Afternoon Evening Bedtime     amlodipine 5 MG Tabs   Commonly known as:  NORVASC        Take 5 mg by mouth every bedtime.   Dose:  5 mg                        carvedilol 12.5 MG Tabs   Last time this was given:  6.25 mg on 6/7/2017  9:10 AM   Commonly known as:  COREG        Take 12.5 mg by mouth 2 times a day, with meals.   Dose:  12.5 mg                        furosemide 40 MG Tabs   Commonly known as:  LASIX        Take 40 mg by mouth every day.   Dose:  40 mg                        insulin glargine 100 UNIT/ML Soln   Commonly known as:  LANTUS        Inject 29 Units as instructed every morning.   Dose:  29 Units                        omeprazole 20 MG delayed-release capsule   Commonly known as:  PRILOSEC        Take 20 mg by mouth every day.   Dose:  20 mg                        PRAVACHOL 40 MG tablet   Generic drug:  pravastatin        Take 40 mg by mouth every evening.   Dose:  40 mg                        sevelamer 800 MG Tabs   Commonly known as:  RENAGEL        Take 800 mg by mouth 3 times a day, with meals.   Dose:  800 mg                        Vitamin D 2000 UNITS Caps        Take 1 Cap by mouth every day.   Dose:  1 Cap                                Orders for after discharge     REFERRAL TO LONG TERM ACUTE CARE    Complete by:  As directed              Instructions           Diet / Nutrition:    Follow any diet instructions given to you by your doctor or the dietician, including how much salt (sodium) you are allowed each day.    If you are overweight, talk to your doctor about a weight reduction plan.    Activity:    Remain physically active following your doctor's instructions about exercise and activity.    Rest often.     Any time you become even a little tired or short of breath, SIT DOWN and rest.    Worsening Symptoms:    Report any of the following signs and symptoms to the doctor's office immediately:    *Pain of jaw, arm, or neck  *Chest pain not relieved by medication                               *Dizziness or loss of  consciousness  *Difficulty breathing even when at rest   *More tired than usual                                       *Bleeding drainage or swelling of surgical site  *Swelling of feet, ankles, legs or stomach                 *Fever (>100ºF)  *Pink or blood tinged sputum  *Weight gain (3lbs/day or 5lbs /week)           *Shock from internal defibrillator (if applicable)  *Palpitations or irregular heartbeats                *Cool and/or numb extremities    Stroke Awareness    Common Risk Factors for Stroke include:    Age  Atrial Fibrillation  Carotid Artery Stenosis  Diabetes Mellitus  Excessive alcohol consumption  High blood pressure  Overweight   Physical inactivity  Smoking    Warning signs and symptoms of a stroke include:    *Sudden numbness or weakness of the face, arm or leg (especially on one side of the body).  *Sudden confusion, trouble speaking or understanding.  *Sudden trouble seeing in one or both eyes.  *Sudden trouble walking, dizziness, loss of balance or coordination.Sudden severe headache with no known cause.    It is very important to get treatment quickly when a stroke occurs. If you experience any of the above warning signs, call 911 immediately.                   Disclaimer         Quit Smoking / Tobacco Use:    I understand the use of any tobacco products increases my chance of suffering from future heart disease or stroke and could cause other illnesses which may shorten my life. Quitting the use of tobacco products is the single most important thing I can do to improve my health. For further information on smoking / tobacco cessation call a Toll Free Quit Line at 1-959.447.2882 (*National Cancer [place]) or 1-729.830.9876 (American Lung Association) or you can access the web based program at www.lungusa.org.    [place] Tobacco Users Help Line:  (879) 717-8797       Toll Free: 1-291.561.8281  Quit Tobacco Program [facility] (938)222-4361    Crisis Hotline:    National  Crisis Hotline:  4-761-TWPQKIT or 1-519.233.8848    [place] Crisis Hotline:    1-231.118.5962 or 711-363-1333    Discharge Survey:   Thank you for choosing [facility]. We hope we did everything we could to make your hospital stay a pleasant one. You may be receiving a phone survey and we would appreciate your time and participation in answering the questions. Your input is very valuable to us in our efforts to improve our service to our patients and their families.        My signature on this form indicates that:    1. I have reviewed and understand the above information.  2. My questions regarding this information have been answered to my satisfaction.  3. I have formulated a plan with my discharge nurse to obtain my prescribed medications for home.                  Disclaimer         __________________________________                     __________       ________                       Patient Signature                                                 Date                    Time

## 2017-05-27 NOTE — ED PROVIDER NOTES
ED Provider Note    Scribed for Dr. Kenny Holcomb D.O. by Khoi Young. 5/27/2017  12:55 AM    Primary care provider: No primary care provider on file.  Means of arrival: Ambulance  History obtained from: EMS  History limited by: Altered level of consciousness    CHIEF COMPLAINT  Chief Complaint   Patient presents with   • Cardiac Arrest     pt was at [facility] and fell to floor, No pulse, CPR started and going for 10 min PTA, CPR done for another 3 miniutes until pulse check here.        HPI  Tyler Thompson is a 68 y.o. female who presents to the Emergency Department brought in by ambulance due to a witnessed arrest prior to arrival. Per EMS, the patient was sitting at a slot machine in the [facility] when she fell to the floor. Compressions were started immediately by a nearby . An AED was used but no shock was advised. Prior to arrival she was asystole and EMS got a spontaneous return after 2 rounds of epinephrine. Per son, the patient is from [place] and they drove to [facility].The patient's son reports that she was normal and not complaining of anything prior to arriving in [place]. Her son dropped her off at the [facility] and went to [place]. When he returned, she was already unconscious. The patient is a dialysis patient and went to dialysis this morning. She is also diabetic.     Further history of present illness cannot be obtained due to the patient's altered level of consciousness.     REVIEW OF SYSTEMS  Review of Systems   Cardiovascular:        Positive for witnessed arrest.    Neurological: Positive for loss of consciousness.    See HPI for further details. Further review of systems is unobtainable as noted above.   C    PAST MEDICAL HISTORY   Diabetes.     SURGICAL HISTORY  No surgical history noted    SOCIAL HISTORY  The patient is from [place], [place].     FAMILY HISTORY  No family history noted    CURRENT MEDICATIONS  No current facility-administered medications on file prior to  "encounter.     No current outpatient prescriptions on file prior to encounter.       ALLERGIES  Allergies   Allergen Reactions   • Pcn [Penicillins]        PHYSICAL EXAM  VITAL SIGNS: /60 mmHg  Pulse 60  Resp 26  Ht 1.6 m (5' 3\")  Wt 72.576 kg (160 lb)  BMI 28.35 kg/m2  SpO2 100%    Constitutional: Severe distress, Elderly-appearing.   HEENT: Atraumatic. Posterior pharynx clear and moist, with oral airway in place.   Eyes:  Pupils 3 mm and reactive.   Neck: Supple, Full range of motion.  Chest/Pulmonary: Diminished breath sounds. Symmetrical expansion.   Cardio: Regular rate and rhythm with no murmur.   Abdomen: Soft. No peritoneal signs. No guarding. No palpable masses.  Musculoskeletal: No deformity, no edema, neurovascular intact.   Neuro: Obtunded.  Intubated.       LABS  Results for orders placed or performed during the hospital encounter of 05/27/17   CBC with Differential   Result Value Ref Range    WBC 10.5 4.8 - 10.8 K/uL    RBC 3.86 (L) 4.20 - 5.40 M/uL    Hemoglobin 10.7 (L) 12.0 - 16.0 g/dL    Hematocrit 36.2 (L) 37.0 - 47.0 %    MCV 93.8 81.4 - 97.8 fL    MCH 27.7 27.0 - 33.0 pg    MCHC 29.6 (L) 33.6 - 35.0 g/dL    RDW 61.6 (H) 35.9 - 50.0 fL    Platelet Count 92 (L) 164 - 446 K/uL    MPV 11.5 9.0 - 12.9 fL    Nucleated RBC 1.00 /100 WBC    NRBC (Absolute) 0.10 K/uL    Neutrophils-Polys 40.70 (L) 44.00 - 72.00 %    Lymphocytes 47.80 (H) 22.00 - 41.00 %    Monocytes 6.20 0.00 - 13.40 %    Eosinophils 0.90 0.00 - 6.90 %    Basophils 0.00 0.00 - 1.80 %    Neutrophils (Absolute) 4.46 2.00 - 7.15 K/uL    Lymphs (Absolute) 5.02 (H) 1.00 - 4.80 K/uL    Monos (Absolute) 0.65 0.00 - 0.85 K/uL    Eos (Absolute) 0.09 0.00 - 0.51 K/uL    Baso (Absolute) 0.00 0.00 - 0.12 K/uL    Anisocytosis 1+     Macrocytosis 1+    Complete Metabolic Panel (CMP)   Result Value Ref Range    Sodium 138 135 - 145 mmol/L    Potassium 4.2 3.6 - 5.5 mmol/L    Chloride 99 96 - 112 mmol/L    Co2 28 20 - 33 mmol/L    Anion Gap " 11.0 0.0 - 11.9    Glucose 139 (H) 65 - 99 mg/dL    Bun 22 8 - 22 mg/dL    Creatinine 4.50 (H) 0.50 - 1.40 mg/dL    Calcium 9.4 8.5 - 10.5 mg/dL    AST(SGOT) 51 (H) 12 - 45 U/L    ALT(SGPT) 42 2 - 50 U/L    Alkaline Phosphatase 77 30 - 99 U/L    Total Bilirubin 0.6 0.1 - 1.5 mg/dL    Albumin 3.9 3.2 - 4.9 g/dL    Total Protein 7.5 6.0 - 8.2 g/dL    Globulin 3.6 (H) 1.9 - 3.5 g/dL    A-G Ratio 1.1 g/dL   Prothrombin Time (PT/INR)   Result Value Ref Range    PT 15.6 (H) 12.0 - 14.6 sec    INR 1.20 (H) 0.87 - 1.13   APTT   Result Value Ref Range    APTT 34.1 24.7 - 36.0 sec   Lipase   Result Value Ref Range    Lipase 36 11 - 82 U/L   Troponin STAT   Result Value Ref Range    Troponin I 0.05 (H) 0.00 - 0.04 ng/mL   ESTIMATED GFR   Result Value Ref Range    GFR If  12 (A) >60 mL/min/1.73 m 2    GFR If Non African American 10 (A) >60 mL/min/1.73 m 2   DIFFERENTIAL MANUAL   Result Value Ref Range    Bands-Stabs 1.80 0.00 - 10.00 %    Myelocytes 2.60 %    Manual Diff Status PERFORMED    PERIPHERAL SMEAR REVIEW   Result Value Ref Range    Peripheral Smear Review see below    PLATELET ESTIMATE   Result Value Ref Range    Plt Estimation Decreased    MORPHOLOGY   Result Value Ref Range    RBC Morphology Present     Poikilocytosis 1+     Ovalocytes 1+     Schistocytes 1+     Tear Drop Cells 1+     Echinocytes 1+    EKG (ER)   Result Value Ref Range    Report       [facility] Emergency Dept[facility]    Test Date:  2017  Pt Name:    PAPO LEES                     Department: ER  MRN:        [id number]                      Room:       RD 03  Gender:     F                            Technician: [id number]  :        [id number]                   Requested By:PILI MARTINEZ  Order #:    [id number]                    Reading MD:    Measurements  Intervals                                Axis  Rate:       55                           P:          56  ME:         172                          QRS:        14  QRSD:        86                           T:          -24  QT:         492  QTc:        471    Interpretive Statements  SINUS BRADYCARDIA  BORDERLINE REPOLARIZATION ABNORMALITY  No previous ECG available for comparison     ISTAT ARTERIAL BLOOD GAS   Result Value Ref Range    Ph 7.305 (L) 7.400 - 7.500    Pco2 55.8 (HH) 26.0 - 37.0 mmHg    Po2 340 (H) 64 - 87 mmHg    Tco2 29 20 - 33 mmol/L    S02 100 (H) 93 - 99 %    Hco3 27.8 (H) 17.0 - 25.0 mmol/L    BE 1 -4 - 3 mmol/L    Body Temp see below degrees    O2 Therapy 100 %    Specimen Arterial      All labs reviewed by me.    EKG  Sinus bradycardia rate of 55. No ST elevations. ST depressions in V3-V6 and lead II. No ectopy noted. Normal intervals. No old EKG for comparison. As interpreted by me.     RADIOLOGY  DX-CHEST-PORTABLE (1 VIEW)   Final Result      1.  No acute cardiopulmonary abnormality.   2.  Endotracheal tube terminates at the level of the aortic arch in satisfactory position.   3.  Enteric feeding tube courses towards the stomach with the distal tip not visualized.      CT-HEAD W/O   Final Result      1.  No evidence of acute territorial infarct, intracranial hemorrhage or mass lesion.   2.  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 Mild diffuse cerebral and cerebellar substance loss.   3.  Mild microangiopathic ischemic change versus demyelination or gliosis.   4.  Findings of acute on chronic sinusitis as described above.        The radiologist's interpretation of all radiological studies have been reviewed by me.    Intubation Procedure Note    Indication: airway protection    Consent: Unable to be obtained due to the emergent nature of this procedure.    Medications Used: None    Procedure: The patient was placed in the appropriate position.  Cricoid pressure was not required.  Intubation was performed by direct laryngoscopy using a laryngoscope and a 7.5 cuffed endotracheal tube.  The cuff was then inflated  and the tube was secured appropriately at a distance of 22 cm to the dental ridge.  Initial confirmation of placement included bilateral breath sounds, an end tidal CO2 detector, absence of sounds over the stomach, tube fogging, adequate chest rise and adequate pulse oximetry reading.  A chest x-ray to verify correct placement of the tube showed appropriate tube position.    The patient tolerated the procedure well.     Complications: None      COURSE & MEDICAL DECISION MAKING  Pertinent Labs & Imaging studies reviewed. (See chart for details)    12:55 AM - Patient seen and examined at bedside. The patient was intubated. She tolerated the procedure well. Patient will be treated with NS infusion 1,000 mL IV for rehydration and propofol 0 mg/ml IV. Ordered chest x-ray, CT head, EKG, morphology, platelet estimate, peripheral smear review, differential manual, estimated GFR, troponin STAT, lipase, APTT, prothrombin time, BNP, CMP, and CBC with differential to evaluate her symptoms. The differential diagnoses include but are not limited to: mi, pe, ptx, pneumonia    1:15 PM- I explained to the patient's son that she was intubated and that her pulse returned.     1:38 AM- Paged Pulmonary.     1:43 PM- I discussed the patient's case and the above findings with Dr. Chavez (Pulmonary) who will consult.     2:14 AM - I discussed the patient's case and the above findings with Dr. Sampson (Hospitalist) who will see the patient.       CRITICAL CARE  The very real possibilty of a deterioration of this patient's condition required the highest level of my preparedness for sudden, emergent intervention.  I provided critical care services, which included medication orders, frequent reevaluations of the patient's condition and response to treatment, ordering and reviewing test results, and discussing the case with various consultants.  The critical care time associated with the care of the patient was 35 minutes. Review chart for  interventions. This time is exclusive of any other billable procedures.     DISPOSITION:  Patient will be admitted to Dr. Sampson (Hospitalist) in critical 35 condition.    FINAL IMPRESSION  1. Cardiac arrest (CMS-[facility])    2.      Critical care 35 minutes.       Khoi VALDEZ (Scribe), am scribing for, and in the presence of, Kenny Holcomb D.O..    Electronically signed by: Khoi Young (Scribe), 5/27/2017    Kenny VALDEZ D.O. personally performed the services described in this documentation, as scribed by Khoi Young in my presence, and it is both accurate and complete.    The note accurately reflects work and decisions made by me.  Kenny Holcomb  5/27/2017  2:45 AM

## 2017-05-27 NOTE — CARE PLAN
Problem: Communication  Goal: The ability to communicate needs accurately and effectively will improve  Outcome: PROGRESSING AS EXPECTED  Educating family of plan of care, current events    Problem: Safety  Goal: Will remain free from injury  Outcome: PROGRESSING AS EXPECTED  Patient is comfortable on vent, restraints in place for patient protection, family educated

## 2017-05-27 NOTE — IP AVS SNAPSHOT
Puuilo Access Code: [email or web address]  Expires: 7/7/2017  5:43 PM    Puuilo  A secure, online tool to manage your health information     [employer]’s Puuilo® is a secure, online tool that connects you to your personalized health information from the privacy of your home -- day or night - making it very easy for you to manage your healthcare. Once the activation process is completed, you can even access your medical information using the Puuilo lawrence, which is available for free in the Apple Lawrence store or Google Play store.     Puuilo provides the following levels of access (as shown below):   My Chart Features   [facility] Primary Care Doctor [facility]  Specialists [facility]  Urgent  Care Non-[facility]  Primary Care  Doctor   Email your healthcare team securely and privately 24/7 X X X X   Manage appointments: schedule your next appointment; view details of past/upcoming appointments X      Request prescription refills. X      View recent personal medical records, including lab and immunizations X X X X   View health record, including health history, allergies, medications X X X X   Read reports about your outpatient visits, procedures, consult and ER notes X X X X   See your discharge summary, which is a recap of your hospital and/or ER visit that includes your diagnosis, lab results, and care plan. X X       How to register for Puuilo:  1. Go to  [email or web address].  2. Click on the Sign Up Now box, which takes you to the New Member Sign Up page. You will need to provide the following information:  a. Enter your Puuilo Access Code exactly as it appears at the top of this page. (You will not need to use this code after you’ve completed the sign-up process. If you do not sign up before the expiration date, you must request a new code.)   b. Enter your date of birth.   c. Enter your home email address.   d. Click Submit, and follow the next screen’s instructions.  3. Create a Puuilo ID. This will be your Puuilo  login ID and cannot be changed, so think of one that is secure and easy to remember.  4. Create a [facility] password. You can change your password at any time.  5. Enter your Password Reset Question and Answer. This can be used at a later time if you forget your password.   6. Enter your e-mail address. This allows you to receive e-mail notifications when new information is available in [facility].  7. Click Sign Up. You can now view your health information.    For assistance activating your [facility] [employer] account, call [phone]

## 2017-05-27 NOTE — IP AVS SNAPSHOT
6/7/2017    Tyler Thompson  [address] [place] [address]    Dear Tyler:    [facility] wants to ensure your discharge home is safe and you or your loved ones have had all of your questions answered regarding your care after you leave the hospital.    Below is a list of resources and contact information should you have any questions regarding your hospital stay, follow-up instructions, or active medical symptoms.    Questions or Concerns Regarding… Contact   Medical Questions Related to Your Discharge  (7 days a week, 8am-5pm) Contact a Nurse Care Coordinator   [phone]   Medical Questions Not Related to Your Discharge  (24 hours a day / 7 days a week)  Contact the Nurse Health Line   [phone]    Medications or Discharge Instructions Refer to your discharge packet   or contact your [facility] Primary Care Provider   [phone]   Follow-up Appointment(s) Schedule your appointment via [employer]   or contact Scheduling [phone]   Billing Review your statement via [employer]  or contact Billing [phone]   Medical Records Review your records via [employer]   or contact Medical Records [phone]     You may receive a telephone call within two days of discharge. This call is to make certain you understand your discharge instructions and have the opportunity to have any questions answered. You can also easily access your medical information, test results and upcoming appointments via the [employer] free online health management tool. You can learn more and sign up at [employer]. For assistance setting up your [employer] account, please call [phone].    Once again, we want to ensure your discharge home is safe and that you have a clear understanding of any next steps in your care. If you have any questions or concerns, please do not hesitate to contact us, we are here for you. Thank you for choosing [facility] for your healthcare needs.    Sincerely,    Your [facility] Healthcare Team

## 2017-05-27 NOTE — ED NOTES
BIB EMS    Chief Complaint   Patient presents with   • Cardiac Arrest     pt was at [facility] and fell to floor, No pulse, CPR started and going for 10 min PTA, CPR done for another 3 miniutes until pulse check here.        PTA 2 rounds of EPI    ERP at bedside

## 2017-05-27 NOTE — PROGRESS NOTES
Pulmonary Critical Care Progress Note        DOS:  5/27/2017    Chief Complaint: Cardiac Arrest    History of Present Illness: The entire history is obtained from healthcare providers, the medical record and the family at the bedside, as this lady cannot give me any history.  I was kindly asked by Dr. Holcomb to see and evaluate this lady regarding the above problems.  This is a 66-year-old lady who resides in [place], [place].  She has end-stage renal disease.  She is on thrice weekly hemodialysis.  Her last dialysis was Friday morning in [place].  She is a diabetic.  According to her son, the family traveled to [place] today for the weekend.  They were checking in to the [facility].  Apparently, she suddenly became unresponsive while sitting in a chair, awaiting hotel registration.  CPR was initiated in the field by bystanders.  An AED was placed on her, but it did not advise defibrillation.  EMS was    activated.  She was brought here to [facility].  She was in asystole.  She had return of spontaneous circulation with intravenous epinephrine and CPR.  She is now intubated on the ventilator.      ROS:  Respiratory: unable to perform due to the patient's inability to effectively communicate, Cardiac: unable to perform due to the patient's inability to effectively communicate, GI: unable to perform due to the patient's inability to effectively communicate.        Interval Events:  24 hour interval history reviewed   Discussed plan of care with 4 family members at bedside.   Tmax 97, WBC normal.   CXR shows cardiomegaly and mild pulmonary edema.   Prop off. Follows commands.   SR, hypertensive. No pressors. NS 10.   Echo pending.   0.61 troponin.   Nephrology aware, possible HD today.       PFSH:  No change.      Respiratory:  Ordaz Vent Mode: APVCMV, Rate (breaths/min): 16, Vt Target (mL): 320, PEEP/CPAP: 8, FiO2: 40, Static Compliance (ml / cm H2O): 28.1, Control VTE (exp VT):  318  Pulse Oximetry: 94 % PIP 21  Exam: WOB minimal on full support. Clear anteriorly, diminished at the bases bilaterally.   ImagingAvailable data reviewed     Recent Labs      05/27/17   0206   ISTATAPH  7.305*   ISTATAPCO2  55.8*   ISTATAPO2  340*   ISTATATCO2  29   UVYWEHL6TWP  100*   ISTATARTHCO3  27.8*   ISTATARTBE  1   ISTATTEMP  see below   ISTATFIO2  100   ISTATSPEC  Arterial       HemoDynamics:  Pulse: (!) 57, Heart Rate (Monitored): (!) 58  Blood Pressure : 140/60 mmHg, NIBP: 129/48 mmHg    Exam: SR, RRR, no M. Heart tones distant. Good capillary refill and pulses.   Imaging: Available data reviewed     ECHO 5/27:  CONCLUSIONS  No prior study is available for comparison.   Normal left ventricular chamber size. Normal left ventricular wall thickness. Moderately reduced left ventricular systolic function. Left ventricular ejection fraction is visually estimated to be 35%. Normal   regional wall motion.   Grade II diastolic dysfunction.  Moderately dilated left atrium.  Moderate mitral regurgitation.  Estimated right ventricular systolic pressure is at least 45 mmHg.  IVC not visualized.      Recent Labs      05/27/17 0110 05/27/17   0710   TROPONINI  0.05*  0.61*   BNPBTYPENAT  988*   --        Neuro:  GCS   11 with sedation vacation.   Prop 30.   Exam: PERRL, left cataract surgery noted, NFE, Per RN withdraws in all 4 with sedation vacation.   Imaging: Available data reviewed    Fluids:  Intake/Output     None        Weight: 71.8 kg (158 lb 4.6 oz)  Recent Labs      05/27/17 0110   SODIUM  138   POTASSIUM  4.2   CHLORIDE  99   CO2  28   BUN  22   CREATININE  4.50*   MAGNESIUM  2.5   CALCIUM  9.4       GI/Nutrition:  Exam: Soft, NT/ND, +BS.   Imaging: Available data reviewed  NPO     Liver Function  Recent Labs      05/27/17 0110   ALTSGPT  42   ASTSGOT  51*   ALKPHOSPHAT  77   TBILIRUBIN  0.6   LIPASE  36   GLUCOSE  139*       Heme:  Recent Labs      05/27/17 0110   RBC  3.86*   HEMOGLOBIN  10.7*    HEMATOCRIT  36.2*   PLATELETCT  92*   PROTHROMBTM  15.6*   APTT  34.1   INR  1.20*       Infectious Disease:  Temp  Av.1 °C (97 °F)  Min: 36.1 °C (97 °F)  Max: 36.1 °C (97 °F)  Monitored Temp  Av.1 °C (97 °F)  Min: 36.1 °C (97 °F)  Max: 36.1 °C (97 °F)  Micro: reviewed     Recent Labs      17   0110   WBC  10.5   NEUTSPOLYS  40.70*   LYMPHOCYTES  47.80*   MONOCYTES  6.20   EOSINOPHILS  0.90   BASOPHILS  0.00   ASTSGOT  51*   ALTSGPT  42   ALKPHOSPHAT  77   TBILIRUBIN  0.6     Current Facility-Administered Medications   Medication Dose Frequency Provider Last Rate Last Dose   • Respiratory Care per Protocol   Continuous RT Dragan Martinez M.D.       • senna-docusate (PERICOLACE or SENOKOT S) 8.6-50 MG per tablet 2 Tab  2 Tab BID Dragan Martinez M.D.        And   • polyethylene glycol/lytes (MIRALAX) PACKET 1 Packet  1 Packet QDAY PRN Dragan Martinez M.D.        And   • magnesium hydroxide (MILK OF MAGNESIA) suspension 30 mL  30 mL QDAY PRN Dragan Martinez M.D.        And   • bisacodyl (DULCOLAX) suppository 10 mg  10 mg QDAY PRN Dragan Martinez M.D.       • chlorhexidine (PERIDEX) 0.12 % solution 15 mL  15 mL BID Dragan Martinez M.D.   15 mL at 17 0750   • lidocaine (XYLOCAINE) 1%  injection  1-2 mL Q30 MIN PRN Dragan Martinez M.D.       • NS infusion   Continuous Dragan Martinez M.D. 10 mL/hr at 17 0354     • heparin injection 5,000 Units  5,000 Units Q8HRS Dragan Martinez M.D.   5,000 Units at 17 0620   • fentaNYL (SUBLIMAZE) injection 25 mcg  25 mcg Q HOUR PRN Dragan Martinez M.D.        Or   • fentaNYL (SUBLIMAZE) injection 50 mcg  50 mcg Q HOUR PRN Dragan Martinez M.D.   50 mcg at 17 0809    Or   • fentaNYL (SUBLIMAZE) injection 100 mcg  100 mcg Q HOUR PRN Dragan Martinez M.D.       • ipratropium-albuterol (DUONEB) nebulizer solution 3 mL  3 mL Q2HRS PRN (RT) Dragan Martinez M.D.        • famotidine (PEPCID) tablet 20 mg  20 mg DAILY Dragan Martinez M.D.        Or   • famotidine (PEPCID) injection 20 mg  20 mg DAILY Dragan Martinez M.D.       • propofol (DIPRIVAN) injection  5-80 mcg/kg/min Continuous Dragan Martinez M.D. 13.1 mL/hr at 05/27/17 1001 30 mcg/kg/min at 05/27/17 1001   • enalaprilat (VASOTEC) injection 1.25 mg  1.25 mg Q6HRS PRN ISABEL Richards.O.       • labetalol (NORMODYNE,TRANDATE) injection 10 mg  10 mg Q4HRS PRN ISABEL Richards.O.       • ondansetron (ZOFRAN) syringe/vial injection 4 mg  4 mg Q4HRS PRN ISABEL Richards.O.       • ondansetron (ZOFRAN ODT) dispertab 4 mg  4 mg Q4HRS PRN ISABEL Richards.O.       • acetaminophen (TYLENOL) tablet 650 mg  650 mg Q6HRS PRN ISABEL Richards.O.       • insulin lispro (HUMALOG) injection 1-6 Units  1-6 Units Q6HRS ISABEL Richards.O.   Stopped at 05/27/17 0600   • glucose 4 g chewable tablet 16 g  16 g Q15 MIN PRN GERARDO RichardsO.        And   • dextrose 50% (D50W) injection 25 mL  25 mL Q15 MIN PRN ISABEL Richards.O.         This patient's medications have not been reviewed.    Quality  Measures:  Labs reviewed, Medications reviewed and Radiology images reviewed  Patricia catheter: Critically Ill - Requiring Accurate Measurement of Urinary Output and Unconscious / Sedated Patient on a Ventilator      DVT Prophylaxis: Heparin  DVT prophylaxis - mechanical: SCDs  Ulcer prophylaxis: Yes    Assessed for rehab: Patient unable to tolerate rehabilitation therapeutic regimen      Assessment and Plan:  Ventilator-dependent respiratory failure.   Intubated AM 5/27   No SBT   RT protcools  Status post witnessed out-of-hospital asystolic cardiac arrest.  She had return of spontaneous circulation with epinephrine and cardiopulmonary resuscitation.   Cardiac consultation - primary  Cardiac arrest/NSTEMI?/valvular heart Dz/myop   Cath/EP?   Stat echo pending.-> NOTED   Serial Trops - low but trending up   ERP   Monitor for  need for Amiodarone/heparin  Cardiomyopathy - EF 35%   Grade II DD  PHTN - mild-moderate RVSP 45   Secondary to MR? Moderate by ECHO  Query anoxic brain injury.  CT scan of the head shows no acute pathology.     With sedation vacation patient following well despite language barrier  Anemia with thrombocytopenia.   Serial lab - transfuse PRN  End-stage renal disease, on maintenance hemodialysis.   Renal consult   HD today?   Hold BP meds for more effective UF - Bp ok   Diabetes mellitus - glycemic control  Enteral nutrrition - Cortrak  ERP  Mobilize when safe  Prophylaxis    Discussed patient condition and risk of morbidity and/or mortality with Family, RN, RT, Pharmacy, Charge nurse / hot rounds and cardiology and nephrology.      Discussed plan of care with 4 family members at bedside.     The patient remains critically ill.  Critical care time = 25 minutes in directly providing and coordinating critical care and extensive data review.  No time overlap and excludes procedures.    Diamond VALDEZ (Tad), am scribing for, and in the presence of, Avinash Millan M.D.   Electronically signed by: Diamond Scott (Tad), 5/27/2017  Avinash VALDEZ M.D.  personally performed the services described in this documentation, as scribed by Diamond Scott in my presence, and it is both accurate and complete.

## 2017-05-27 NOTE — PROGRESS NOTES
The Medication Reconciliation process has been completed by interviewing the patient's family and [facility] [place] [phone]    Allergies have been reviewed and update  Antibiotic use in 30 days - None    Home Pharmacy:  [facility] [place] [phone]

## 2017-05-27 NOTE — IP AVS SNAPSHOT
" <p align=\"LEFT\"><IMG SRC=\"//[email or web address]$/[email or web address]\" alt=\"Image\" WIDTH=\"50%\" HEIGHT=\"200\" BORDER=\"\"></p>                   Name:Tyler Thompson  Medical Record Number:[id number]  CSN: [id number]    YOB: 1950   Age: 66 y.o.  Sex: female  HT:1.575 m (5' 2\") WT: 54 kg (119 lb 0.8 oz)          Admit Date: 5/27/2017     Discharge Date:   Today's Date: 6/7/2017  Attending Doctor:  Ricardo Castaneda M.D.                  Allergies:  n             Medication List      Ask your Physician about these medications        Instructions    amlodipine 5 MG Tabs   Commonly known as:  NORVASC    Take 5 mg by mouth every bedtime.   Dose:  5 mg       carvedilol 12.5 MG Tabs   Commonly known as:  COREG    Take 12.5 mg by mouth 2 times a day, with meals.   Dose:  12.5 mg       furosemide 40 MG Tabs   Commonly known as:  LASIX    Take 40 mg by mouth every day.   Dose:  40 mg       insulin glargine 100 UNIT/ML Soln   Commonly known as:  LANTUS    Inject 29 Units as instructed every morning.   Dose:  29 Units       omeprazole 20 MG delayed-release capsule   Commonly known as:  PRILOSEC    Take 20 mg by mouth every day.   Dose:  20 mg       PRAVACHOL 40 MG tablet   Generic drug:  pravastatin    Take 40 mg by mouth every evening.   Dose:  40 mg       sevelamer 800 MG Tabs   Commonly known as:  RENAGEL    Take 800 mg by mouth 3 times a day, with meals.   Dose:  800 mg       Vitamin D 2000 UNITS Caps    Take 1 Cap by mouth every day.   Dose:  1 Cap         "

## 2017-05-27 NOTE — DISCHARGE PLANNING
Medical Social Work:  SW notified by charge RN of cardiac arrest . Family waiting in lobby. SW able to escort family to Family room. SW notified ERP family in room waiting for Medical update. SW able to offer support, RN advised family could come visit. SW brought 2 family members to bedside. SW will continue to monitor and offer support as needed.

## 2017-05-27 NOTE — RESPIRATORY CARE
Intubation Assist    Intubation assist performed yes  Reason for intubation respiratory failure  Positive Color Change on EZCap? yes  Difficult Intubation/Number of attempts no, one attempt  Evidence of aspiration no    Airway Group ET Tube Oral 7.5-Secured At  (cm): 23 (05/27/17 0121)    Ordaz Vent Mode: (S)CMV (05/27/17 0119)  Rate (breaths/min): 22 (05/27/17 0119)  Vt Target (mL): 350 (05/27/17 0119)  FiO2: 100 (05/27/17 0119)  PEEP/CPAP: 8 (05/27/17 0119)

## 2017-05-27 NOTE — OP REPORT
DATE OF SERVICE:  05/27/2017    TITLE OF THE PROCEDURE:  Central venous catheter placement.    INDICATION FOR THE PROCEDURE:  A lady with out-of-hospital cardiac arrest,   requires central venous access for medication administration.    NARRATIVE:  The right neck was prepped with chlorhexidine and draped in the   usual sterile fashion.  Xylocaine 1% solution was used for topical anesthesia.    A triple lumen central venous catheter was easily placed into the right   internal jugular vein under ultrasound guidance on the first attempt using the   technique described by Lyn without difficulty or apparent complication.    The line was sutured into place and a sterile dressing was placed over the   line.  All 3 ports flushed and returned venous blood easily.  The patient   tolerated the procedure quite nicely.  No complications are apparent.  A chest   x-ray will be obtained in the next few minutes to confirm placement.       ____________________________________     MD JOHNNIE NUÑEZ / ARGELIA    DD:  05/27/2017 05:06:47  DT:  05/27/2017 05:15:38    D#:  [id number]  Job#:  [id number]

## 2017-05-27 NOTE — PROGRESS NOTES
Bedside report received from ED RN. Two RNs and RT transported patient from [facility] to 20 via gurney, connected to transport monitor and on ventilator. Vital signs remained stable during transfer. All monitors connected and alarms set properly.

## 2017-05-27 NOTE — H&P
DATE OF ADMISSION:  05/27/2017    PRIMARY CARE PHYSICIAN:  Out of town in [place], [place], Dr. Stanley Aguayo, office number is [phone].    CHIEF COMPLAINT:  Out-of-hospital cardiac arrest.    HISTORY OF PRESENT ILLNESS:  This is a 66-year-old female, she has a history   of end-stage renal disease, is on hemodialysis for the past 7 years per   family.  Patient is currently on the ventilator, she and her family came over   for vacation and they drove over today, had no complications, patient and   family were walking into a hotel, checking in, patient was sitting down on the   chair, then fell back into her chair without any trauma, was nonresponsive,   gasping for air, at this point in time, passerby who is EMS trained per   family, laid the patient down on the floor, gently they called 911, patient   was gasping for air, pulse was initially checked at the time paramedics   arrived, apparently the pulse had stopped, CPR was initiated until patient   arrived here in the emergency room, patient was intubated, currently   unresponsive, sedated.  Patient's family state the patient had been doing   well, she has been conversant, no chest pain complaints, no shortness of   breath out of ordinary, patient's family that she has been feeling short of   breath over the last week.  Patient has not had any diarrhea, no constipation,   has been eating otherwise normally.    REVIEW OF SYSTEMS:  As per family above, otherwise unable to obtain as patient   is currently on the ventilator.    ALLERGIES:  PENICILLIN.    CURRENT MEDICATIONS:  Patient is unable to give me any of her home   medications, it is in a pillbox, they do not have pills, they recommend that   we call Dr. Aguayo, area code [phone] that is early in a.m., office is   not open, unable to obtain at this point in time.    PAST MEDICAL HISTORY:  End-stage renal disease, on hemodialysis.    PAST SURGICAL HISTORY:  She has had AV fistula on the right  upper arm.    Otherwise, no known surgeries.    SOCIAL HISTORY:  She is .  She has had 3 children.  Nonsmoker,   nondrinker.    FAMILY HISTORY:  Unknown.  I have asked the children and the patient's    unknown what the parents  off.    PHYSICAL EXAMINATION:  VITAL SIGNS:  Temperature is 97, heart rate in the 50s, blood pressure 135/56,   currently 100% FIO2, and 100% on SpO2.  GENERAL:  This is a  female.  She is currently on the ventilator,   nonresponsive, sedated.  HEENT:  NCAT.  Her pupils are pinpoint, but equal.  Sclerae anicteric.  Nares   are patent.  She is currently intubated.  NECK:  Trachea is midline.  I did not hear any stridor.  No adenopathy.  LUNGS:  Clear to auscultation bilaterally.  No accessory muscle use.  CARDIOVASCULAR:  Regular rate and rhythm.  No murmurs, gallops or rubs.    Normal S1, S2.  ABDOMEN:  Soft, nontender, mildly overweight, normal active bowel sounds.  No   tympany on percussion.  EXTREMITIES:  She has no lower extremity edema.  I have thready posterior   tibialis pulses.  I have 1+ radial artery pulse on the left.  She has palpable   thrill on the right upper extremity.  NEUROLOGIC:  Unable to fully assess.    LABORATORY DATA:  CBC shows a white blood cell count of 10.5, hemoglobin 10.7,   hematocrit 36.2, platelet count is 92.  Chemistry panel shows BUN of 22,   creatinine is 4.5, glucose of 139, calcium 9.4.  AST of 51, ALT of 42.    Magnesium is 2.5, lipase of 36.  Troponin is 0.05.  BNP is 988.  INR is 1.2.    Blood gas, pH is 7.3, pCO2 of 55.8, pO2 of 340, free T4 is 0.9.    CT of the head showed no evidence of acute territorial infarct, intracranial   hemorrhage or mass lesion, small size chronic-appearing infarct involving the   right caudate head and basal ganglia, mild diffuse cerebral and cerebellar   substance loss.  Chest x-ray showed no acute cardiopulmonary abnormalities,   endotracheal tube terminates at the level of the aortic arch in  satisfactory   position, enteric feeding tube toward the stomach with distal tip not   visualized.    ASSESSMENT AND PLAN:  1.  Out of hospital cardiac arrest, uncertain etiology.  The patient has risk   factor of end-stage renal disease.  We will followup on serial troponins.    Patient will need to have cardiology consultation.  I have her on aspirin.    EKG did show a heart rate of 55, sinus bradycardia, some questionable flipped   T-waves in leads III and aVF.  Consideration of cardiac cath.  Pulmonary   embolism is also consideration, we will have CAT scan, CT of the chest   considered, we will need to coordinate with dialysis.  Patient is not   tachycardic, we will check an echocardiogram.  Hold any beta-blockers   secondary to her bradycardia, I have her on telemetry.  2.  Acute respiratory failure, Dr. Dragan Mooney has already consulted.  3.  End-stage renal disease.  We will have nephrology consult in the a.m. for   any ongoing hemodialysis.  Unknown baseline creatinine per family.  4.  Elevated BNP, likely secondary to her end-stage renal disease; however,   question of congestive heart failure, we will check an echocardiogram.  5.  Incomplete database, need medical records from patient's primary as well   as her pharmaceutical list.       ____________________________________     DO DOMENICO DUEÑAS / ARGELIA    DD:  05/27/2017 08:17:56  DT:  05/27/2017 09:04:28    D#:  [id number]  Job#:  [id number]

## 2017-05-27 NOTE — CONSULTS
DATE OF SERVICE:  05/27/2017    PULMONARY MEDICINE AND CRITICAL CARE MEDICINE CONSULTATION    REQUESTING PHYSICIAN:  Kenny Holcomb MD    CONSULTING PHYSICIAN:  Dragan Chavez MD    TYPE OF CONSULTATION:  Pulmonary medicine and critical care medicine.    REASON FOR CONSULTATION:  Evaluation and management of ventilator dependent   respiratory failure and assist with critical care management in lady who has   suffered an out out-of-hospital cardiac arrest.    CHIEF COMPLAINT:  The patient cannot provide.    HISTORY OF PRESENT ILLNESS:  The entire history is obtained from healthcare   providers, the medical record and the family at the bedside, as this lady   cannot give me any history.  I was kindly asked by Dr. Holcomb to see and   evaluate this lady regarding the above problems.  This is a 66-year-old lady   who resides in [place], [place].  She has end-stage renal disease.  She is   on thrice weekly hemodialysis.  Her last dialysis was Friday morning in [place].  She is a diabetic.  According to her son, the family traveled to [place]   today for the weekend.  They were checking in to the [facility].    Apparently, she suddenly became unresponsive while sitting in a chair,   awaiting hotel registration.  CPR was initiated in the field by bystanders.    An AED was placed on her, but it did not advise defibrillation.  EMS was   activated.  She was brought [facility] to [facility].  She was   in asystole.  She had return of spontaneous circulation with intravenous   epinephrine and CPR.  She is now intubated on the ventilator.    According to the family, she was feeling well in her usual state of health   prior to coming to [place] today.    CURRENT MEDICATIONS:  She takes insulin as well as other medications at home.    The family does not know the name of her medicines other than her insulin.    ALLERGIES:  TO PENICILLIN.    PAST SURGICAL HISTORY:  She has had a right arm AV  fistula for dialysis.    ILLNESSES:  Diabetes mellitus and end-stage renal disease.    SOCIAL HISTORY:  She does not smoke or drink.  She is .    FAMILY HISTORY:  Noncontributory.    REVIEW OF SYSTEMS:  Not obtainable.    PHYSICAL EXAMINATION:  VITAL SIGNS:  Her temperature is 96.8, her blood pressure is 120/49, heart   rate 55, respiratory rate is 22.  GENERAL:  She is sedated on the ventilator.  HEENT:  Normocephalic, atraumatic.  Sinuses are nontender.  Nares patent.    Oropharynx with moist mucous membranes.  An endotracheal tube is in place.    Her left pupil is approximately 4 mm and reacts briskly to light.  The right   pupil is somewhat irregular consistent with previous right ocular surgery.    The pupil does react.  NECK:  Trachea midline, supple.  CHEST:  Symmetrical.  HEART:  Regular rhythm.  LUNGS:  Clear to auscultation.  ABDOMEN:  Soft, nondistended, nontender.  EXTREMITIES:  No clubbing or cyanosis.  NEUROLOGIC:  She does not respond to voice, but she is on propofol at 30 mcg.    She withdraws to pain in all extremities.    DIAGNOSTIC DATA:  Her white blood cell count is 10,500, hemoglobin 10.7,   hematocrit 36.2, platelet count 92,000.  Sodium 138, potassium 4.2, chloride   99, CO2 of 28, BUN 22, creatinine 4.5, glucose 139.  Troponin I 0.05.    Arterial blood gas shows a pH of 7.31, pCO2 of 56, pO2 of 340.  CT scan shows   no acute pathology.  Chest x-ray shows some perihilar opacification   bilaterally.  12-lead electrocardiogram reveals sinus rhythm.  There are some   nonspecific ST changes.    IMPRESSION:  1.  Ventilator-dependent respiratory failure.  2.  Status post witnessed out-of-hospital asystolic cardiac arrest.  She had   return of spontaneous circulation with epinephrine and cardiopulmonary   resuscitation.  3.  Query anoxic brain injury.  CT scan of the head shows no acute pathology.    She is withdrawing to pain in all 4 extremities.  4.  Anemia with thrombocytopenia.  5.   End-stage renal disease, on maintenance hemodialysis.  6.  Diabetes mellitus.    PLAN AND MEDICAL DECISION MAKING:  This lady is critically ill.  She is going   to be admitted to the intensive care unit.  She will remain intubated on full   mechanical ventilatory support.  Deep venous thrombosis prophylaxis and stress   ulcer prophylaxis will both be provided.  We will perform serial neurologic   exams.  She is not a candidate for the hypothermia protocol because she is   withdrawing to pain.  We will follow her hemoglobin and platelet count very   closely.  Her blood sugars will be controlled.  We will get nephrology   consultation for her maintenance hemodialysis.  At the current time, she does   not require dialysis urgently.    This lady is critically ill.  I have spent a total of 86 minutes providing   direct critical care services at the bedside.  There has been no time overlap.    The time spent excludes the time spent performing procedures (13950, 62380).    The case has been reviewed with the family at the bedside, Dr. Holcomb,   nursing and respiratory therapy.    Thank you Dr. Holcomb for allowing us to participate in the care of this   lady.  We will continue to follow her with great interest.       ____________________________________     MD JOHNNIE NUÑEZ / ARGELIA    DD:  05/27/2017 03:38:19  DT:  05/27/2017 04:03:30    D#:  [id number]  Job#:  [id number]    cc: PILI MCINTYRE DO, MD

## 2017-05-28 ENCOUNTER — APPOINTMENT (OUTPATIENT)
Dept: RADIOLOGY | Facility: MEDICAL CENTER | Age: 67
DRG: 264 | End: 2017-05-28
Attending: INTERNAL MEDICINE
Payer: MEDICARE

## 2017-05-28 LAB
ANION GAP SERPL CALC-SCNC: 9 MMOL/L (ref 0–11.9)
BASE EXCESS BLDA CALC-SCNC: 4 MMOL/L (ref -4–3)
BASOPHILS # BLD AUTO: 0.4 % (ref 0–1.8)
BASOPHILS # BLD: 0.04 K/UL (ref 0–0.12)
BODY TEMPERATURE: ABNORMAL DEGREES
BUN SERPL-MCNC: 20 MG/DL (ref 8–22)
CALCIUM SERPL-MCNC: 8.6 MG/DL (ref 8.5–10.5)
CHLORIDE SERPL-SCNC: 101 MMOL/L (ref 96–112)
CHOLEST SERPL-MCNC: 66 MG/DL (ref 100–199)
CO2 BLDA-SCNC: 29 MMOL/L (ref 20–33)
CO2 SERPL-SCNC: 28 MMOL/L (ref 20–33)
CREAT SERPL-MCNC: 3.57 MG/DL (ref 0.5–1.4)
EKG IMPRESSION: NORMAL
EOSINOPHIL # BLD AUTO: 0.1 K/UL (ref 0–0.51)
EOSINOPHIL NFR BLD: 1 % (ref 0–6.9)
ERYTHROCYTE [DISTWIDTH] IN BLOOD BY AUTOMATED COUNT: 57.7 FL (ref 35.9–50)
GFR SERPL CREATININE-BSD FRML MDRD: 13 ML/MIN/1.73 M 2
GLUCOSE BLD-MCNC: 101 MG/DL (ref 65–99)
GLUCOSE BLD-MCNC: 102 MG/DL (ref 65–99)
GLUCOSE BLD-MCNC: 114 MG/DL (ref 65–99)
GLUCOSE BLD-MCNC: 138 MG/DL (ref 65–99)
GLUCOSE BLD-MCNC: 94 MG/DL (ref 65–99)
GLUCOSE SERPL-MCNC: 99 MG/DL (ref 65–99)
GRAM STN SPEC: NORMAL
GRAM STN SPEC: NORMAL
HCO3 BLDA-SCNC: 28.1 MMOL/L (ref 17–25)
HCT VFR BLD AUTO: 28 % (ref 37–47)
HDLC SERPL-MCNC: 27 MG/DL
HGB BLD-MCNC: 8.7 G/DL (ref 12–16)
IMM GRANULOCYTES # BLD AUTO: 0.04 K/UL (ref 0–0.11)
IMM GRANULOCYTES NFR BLD AUTO: 0.4 % (ref 0–0.9)
LACTATE BLD-SCNC: 0.9 MMOL/L (ref 0.5–2)
LDLC SERPL CALC-MCNC: 9 MG/DL
LYMPHOCYTES # BLD AUTO: 1.23 K/UL (ref 1–4.8)
LYMPHOCYTES NFR BLD: 12.9 % (ref 22–41)
MAGNESIUM SERPL-MCNC: 2 MG/DL (ref 1.5–2.5)
MCH RBC QN AUTO: 27.1 PG (ref 27–33)
MCHC RBC AUTO-ENTMCNC: 31.1 G/DL (ref 33.6–35)
MCV RBC AUTO: 87.2 FL (ref 81.4–97.8)
MONOCYTES # BLD AUTO: 0.43 K/UL (ref 0–0.85)
MONOCYTES NFR BLD AUTO: 4.5 % (ref 0–13.4)
NEUTROPHILS # BLD AUTO: 7.7 K/UL (ref 2–7.15)
NEUTROPHILS NFR BLD: 80.8 % (ref 44–72)
NRBC # BLD AUTO: 0 K/UL
NRBC BLD AUTO-RTO: 0 /100 WBC
O2/TOTAL GAS SETTING VFR VENT: 40 %
PCO2 BLDA: 37.9 MMHG (ref 26–37)
PCO2 TEMP ADJ BLDA: 39.7 MMHG (ref 26–37)
PH BLDA: 7.48 [PH] (ref 7.4–7.5)
PH TEMP ADJ BLDA: 7.46 [PH] (ref 7.4–7.5)
PHOSPHATE SERPL-MCNC: 2.2 MG/DL (ref 2.5–4.5)
PLATELET # BLD AUTO: 107 K/UL (ref 164–446)
PMV BLD AUTO: 11.1 FL (ref 9–12.9)
PO2 BLDA: 60 MMHG (ref 64–87)
PO2 TEMP ADJ BLDA: 64 MMHG (ref 64–87)
POTASSIUM SERPL-SCNC: 4 MMOL/L (ref 3.6–5.5)
RBC # BLD AUTO: 3.21 M/UL (ref 4.2–5.4)
SAO2 % BLDA: 92 % (ref 93–99)
SIGNIFICANT IND 70042: NORMAL
SIGNIFICANT IND 70042: NORMAL
SITE SITE: NORMAL
SITE SITE: NORMAL
SODIUM SERPL-SCNC: 138 MMOL/L (ref 135–145)
SOURCE SOURCE: NORMAL
SOURCE SOURCE: NORMAL
SPECIMEN DRAWN FROM PATIENT: ABNORMAL
TRIGL SERPL-MCNC: 148 MG/DL (ref 0–149)
TROPONIN I SERPL-MCNC: 6.3 NG/ML (ref 0–0.04)
WBC # BLD AUTO: 9.5 K/UL (ref 4.8–10.8)

## 2017-05-28 PROCEDURE — 87116 MYCOBACTERIA CULTURE: CPT | Mod: 91

## 2017-05-28 PROCEDURE — 83735 ASSAY OF MAGNESIUM: CPT

## 2017-05-28 PROCEDURE — 88112 CYTOPATH CELL ENHANCE TECH: CPT | Mod: 91

## 2017-05-28 PROCEDURE — 31645 BRNCHSC W/THER ASPIR 1ST: CPT | Performed by: INTERNAL MEDICINE

## 2017-05-28 PROCEDURE — A9270 NON-COVERED ITEM OR SERVICE: HCPCS | Performed by: INTERNAL MEDICINE

## 2017-05-28 PROCEDURE — 93010 ELECTROCARDIOGRAM REPORT: CPT | Mod: 76 | Performed by: INTERNAL MEDICINE

## 2017-05-28 PROCEDURE — 700102 HCHG RX REV CODE 250 W/ 637 OVERRIDE(OP)

## 2017-05-28 PROCEDURE — 82962 GLUCOSE BLOOD TEST: CPT | Mod: 91

## 2017-05-28 PROCEDURE — 84100 ASSAY OF PHOSPHORUS: CPT

## 2017-05-28 PROCEDURE — 93005 ELECTROCARDIOGRAM TRACING: CPT | Performed by: INTERNAL MEDICINE

## 2017-05-28 PROCEDURE — 0B968ZZ DRAINAGE OF RIGHT LOWER LOBE BRONCHUS, VIA NATURAL OR ARTIFICIAL OPENING ENDOSCOPIC: ICD-10-PCS | Performed by: INTERNAL MEDICINE

## 2017-05-28 PROCEDURE — 700111 HCHG RX REV CODE 636 W/ 250 OVERRIDE (IP): Performed by: INTERNAL MEDICINE

## 2017-05-28 PROCEDURE — 99233 SBSQ HOSP IP/OBS HIGH 50: CPT | Performed by: HOSPITALIST

## 2017-05-28 PROCEDURE — A9270 NON-COVERED ITEM OR SERVICE: HCPCS

## 2017-05-28 PROCEDURE — 87015 SPECIMEN INFECT AGNT CONCNTJ: CPT

## 2017-05-28 PROCEDURE — 80048 BASIC METABOLIC PNL TOTAL CA: CPT

## 2017-05-28 PROCEDURE — 700102 HCHG RX REV CODE 250 W/ 637 OVERRIDE(OP): Performed by: INTERNAL MEDICINE

## 2017-05-28 PROCEDURE — 302978 HCHG BRONCHOSCOPY-DIAGNOSTIC

## 2017-05-28 PROCEDURE — 87077 CULTURE AEROBIC IDENTIFY: CPT

## 2017-05-28 PROCEDURE — 83605 ASSAY OF LACTIC ACID: CPT

## 2017-05-28 PROCEDURE — 99291 CRITICAL CARE FIRST HOUR: CPT | Mod: 25 | Performed by: INTERNAL MEDICINE

## 2017-05-28 PROCEDURE — 770022 HCHG ROOM/CARE - ICU (200)

## 2017-05-28 PROCEDURE — 85025 COMPLETE CBC W/AUTO DIFF WBC: CPT

## 2017-05-28 PROCEDURE — 87070 CULTURE OTHR SPECIMN AEROBIC: CPT | Mod: 91

## 2017-05-28 PROCEDURE — 0B9F8ZX DRAINAGE OF RIGHT LOWER LUNG LOBE, VIA NATURAL OR ARTIFICIAL OPENING ENDOSCOPIC, DIAGNOSTIC: ICD-10-PCS | Performed by: INTERNAL MEDICINE

## 2017-05-28 PROCEDURE — 93010 ELECTROCARDIOGRAM REPORT: CPT | Performed by: INTERNAL MEDICINE

## 2017-05-28 PROCEDURE — 36600 WITHDRAWAL OF ARTERIAL BLOOD: CPT

## 2017-05-28 PROCEDURE — 700101 HCHG RX REV CODE 250: Performed by: INTERNAL MEDICINE

## 2017-05-28 PROCEDURE — 84484 ASSAY OF TROPONIN QUANT: CPT

## 2017-05-28 PROCEDURE — 88305 TISSUE EXAM BY PATHOLOGIST: CPT | Mod: 59

## 2017-05-28 PROCEDURE — 700105 HCHG RX REV CODE 258: Performed by: INTERNAL MEDICINE

## 2017-05-28 PROCEDURE — 82803 BLOOD GASES ANY COMBINATION: CPT

## 2017-05-28 PROCEDURE — 87205 SMEAR GRAM STAIN: CPT | Mod: 91

## 2017-05-28 PROCEDURE — 94003 VENT MGMT INPAT SUBQ DAY: CPT

## 2017-05-28 PROCEDURE — 71010 DX-CHEST-PORTABLE (1 VIEW): CPT

## 2017-05-28 PROCEDURE — 87040 BLOOD CULTURE FOR BACTERIA: CPT

## 2017-05-28 PROCEDURE — 87206 SMEAR FLUORESCENT/ACID STAI: CPT

## 2017-05-28 PROCEDURE — 302136 NUTRITION PUMP: Performed by: INTERNAL MEDICINE

## 2017-05-28 PROCEDURE — 87102 FUNGUS ISOLATION CULTURE: CPT

## 2017-05-28 PROCEDURE — 31624 DX BRONCHOSCOPE/LAVAGE: CPT | Performed by: INTERNAL MEDICINE

## 2017-05-28 PROCEDURE — 87186 SC STD MICRODIL/AGAR DIL: CPT

## 2017-05-28 PROCEDURE — 0B938ZZ DRAINAGE OF RIGHT MAIN BRONCHUS, VIA NATURAL OR ARTIFICIAL OPENING ENDOSCOPIC: ICD-10-PCS | Performed by: INTERNAL MEDICINE

## 2017-05-28 RX ORDER — CARVEDILOL 12.5 MG/1
12.5 TABLET ORAL 2 TIMES DAILY WITH MEALS
Status: DISCONTINUED | OUTPATIENT
Start: 2017-05-28 | End: 2017-05-31

## 2017-05-28 RX ORDER — CARVEDILOL 12.5 MG/1
12.5 TABLET ORAL ONCE
Status: COMPLETED | OUTPATIENT
Start: 2017-05-28 | End: 2017-05-28

## 2017-05-28 RX ORDER — ACETAMINOPHEN 650 MG/1
650 SUPPOSITORY RECTAL EVERY 6 HOURS PRN
Status: DISCONTINUED | OUTPATIENT
Start: 2017-05-28 | End: 2017-06-07 | Stop reason: HOSPADM

## 2017-05-28 RX ADMIN — CEFTRIAXONE SODIUM 1 G: 1 INJECTION, POWDER, FOR SOLUTION INTRAMUSCULAR; INTRAVENOUS at 16:12

## 2017-05-28 RX ADMIN — METRONIDAZOLE 500 MG: 500 INJECTION, SOLUTION INTRAVENOUS at 14:56

## 2017-05-28 RX ADMIN — HEPARIN SODIUM 5000 UNITS: 5000 INJECTION, SOLUTION INTRAVENOUS; SUBCUTANEOUS at 21:42

## 2017-05-28 RX ADMIN — PROPOFOL 25 MCG/KG/MIN: 10 INJECTION, EMULSION INTRAVENOUS at 17:08

## 2017-05-28 RX ADMIN — FENTANYL CITRATE 50 MCG: 50 INJECTION, SOLUTION INTRAMUSCULAR; INTRAVENOUS at 09:44

## 2017-05-28 RX ADMIN — CARVEDILOL 12.5 MG: 12.5 TABLET, FILM COATED ORAL at 12:39

## 2017-05-28 RX ADMIN — CHLORHEXIDINE GLUCONATE 15 ML: 1.2 RINSE ORAL at 10:08

## 2017-05-28 RX ADMIN — ACETAMINOPHEN 650 MG: 650 SUPPOSITORY RECTAL at 04:44

## 2017-05-28 RX ADMIN — PROPOFOL 40 MCG/KG/MIN: 10 INJECTION, EMULSION INTRAVENOUS at 06:35

## 2017-05-28 RX ADMIN — STANDARDIZED SENNA CONCENTRATE AND DOCUSATE SODIUM 2 TABLET: 8.6; 5 TABLET, FILM COATED ORAL at 12:39

## 2017-05-28 RX ADMIN — METRONIDAZOLE 500 MG: 500 INJECTION, SOLUTION INTRAVENOUS at 21:42

## 2017-05-28 RX ADMIN — HEPARIN SODIUM 5000 UNITS: 5000 INJECTION, SOLUTION INTRAVENOUS; SUBCUTANEOUS at 04:43

## 2017-05-28 RX ADMIN — STANDARDIZED SENNA CONCENTRATE AND DOCUSATE SODIUM 2 TABLET: 8.6; 5 TABLET, FILM COATED ORAL at 21:42

## 2017-05-28 RX ADMIN — FAMOTIDINE 20 MG: 10 INJECTION, SOLUTION INTRAVENOUS at 09:44

## 2017-05-28 RX ADMIN — CHLORHEXIDINE GLUCONATE 15 ML: 1.2 RINSE ORAL at 21:42

## 2017-05-28 RX ADMIN — HEPARIN SODIUM 5000 UNITS: 5000 INJECTION, SOLUTION INTRAVENOUS; SUBCUTANEOUS at 12:40

## 2017-05-28 RX ADMIN — FENTANYL CITRATE 50 MCG: 50 INJECTION, SOLUTION INTRAMUSCULAR; INTRAVENOUS at 14:08

## 2017-05-28 ASSESSMENT — LIFESTYLE VARIABLES
REASON UNABLE TO ASSESS: INTUBATED/SEDATED
REASON UNABLE TO ASSESS: INTUBATED

## 2017-05-28 ASSESSMENT — PAIN SCALES - GENERAL: PAINLEVEL_OUTOF10: ASSUMED PAIN PRESENT

## 2017-05-28 NOTE — PROGRESS NOTES
Nephrology Progress Note, Adult, Complex               Author: Yahaira Bear Date & Time created: 5/28/2017  10:29 AM     Interval History:  67 y/o female with ESRD/HD, s/p cardiac arrest  Intubated on vent  No acute events  HD MWF    Review of Systems:  Review of Systems   Unable to perform ROS: intubated       Physical Exam:  Physical Exam   Constitutional: She appears well-developed and well-nourished. No distress. She is intubated.   HENT:   Head: Normocephalic and atraumatic.   ETT   Eyes: Pupils are equal, round, and reactive to light.   Cardiovascular: Normal rate and regular rhythm.  Exam reveals no gallop and no friction rub.    Pulmonary/Chest: She is intubated. She has no wheezes. She has no rales.   vented   Abdominal: Soft. Bowel sounds are normal. She exhibits no distension.   Musculoskeletal: She exhibits no edema.   Neurological:   Opening eyes   Skin: Skin is warm. No erythema.   Nursing note and vitals reviewed.      Labs:  Recent Labs      05/27/17   0206  05/27/17   1742  05/28/17   0455   ISTATAPH  7.305*  7.445  7.479   ISTATAPCO2  55.8*  43.1*  37.9*   ISTATAPO2  340*  60*  60*   ISTATATCO2  29  31  29   [id number]  100*  91*  92*   ISTATARTHCO3  27.8*  29.6*  28.1*   ISTATARTBE  1  5*  4*   ISTATTEMP  see below  37.0 C  38.1 C   ISTATFIO2  100  40  40   ISTATSPEC  Arterial  Arterial  Arterial   ISTATAPHTC   --   7.445  7.462   [id number]   --   60*  64     Recent Labs      05/27/17   0110  05/27/17   0710  05/27/17   1230  05/27/17   1800   TROPONINI  0.05*  0.61*  0.93*  1.67*   BNPBTYPENAT  988*   --    --    --      Recent Labs      05/27/17   0110  05/27/17   1230  05/28/17   0452   SODIUM  138  138  138   POTASSIUM  4.2  4.3  4.0   CHLORIDE  99  102  101   CO2  28 28  28   BUN  22  30*  20   CREATININE  4.50*  4.74*  3.57*   MAGNESIUM  2.5   --   2.0   PHOSPHORUS   --    --   2.2*   CALCIUM  9.4  8.7  8.6     Recent Labs      05/27/17   0110  05/27/17   1230  05/28/17   0452   ALTSGPT   42   --    --    ASTSGOT  51*   --    --    ALKPHOSPHAT  77   --    --    TBILIRUBIN  0.6   --    --    LIPASE  36   --    --    GLUCOSE  139*  133*  99     Recent Labs      17   0110  17   0452   RBC  3.86*  3.21*   HEMOGLOBIN  10.7*  8.7*   HEMATOCRIT  36.2*  28.0*   PLATELETCT  92*  107*   PROTHROMBTM  15.6*   --    APTT  34.1   --    INR  1.20*   --      Recent Labs      17   0110  17   0452   WBC  10.5  9.5   NEUTSPOLYS  40.70*  80.80*   LYMPHOCYTES  47.80*  12.90*   MONOCYTES  6.20  4.50   EOSINOPHILS  0.90  1.00   BASOPHILS  0.00  0.40   ASTSGOT  51*   --    ALTSGPT  42   --    ALKPHOSPHAT  77   --    TBILIRUBIN  0.6   --            Hemodynamics:  No data recorded.  Monitored Temp: 38.1 °C (100.6 °F)  Pulse  Av.2  Min: 49  Max: 71Heart Rate (Monitored): 73  NIBP: 146/47 mmHg     Respiratory:  Ordaz Vent Mode: APVCMV, Rate (breaths/min): 13, PEEP/CPAP: 8, FiO2: 45, P Peak (PIP): 20, P MEAN: 12 Respiration: 15, Pulse Oximetry: 91 %     Work Of Breathing / Effort: Vented  RUL Breath Sounds: Clear, RML Breath Sounds: Diminished, RLL Breath Sounds: Diminished, DEV Breath Sounds: Clear, LLL Breath Sounds: Diminished  Fluids:    Intake/Output Summary (Last 24 hours) at 17 1029  Last data filed at 17 0400   Gross per 24 hour   Intake 913.63 ml   Output   1825 ml   Net -911.37 ml        GI/Nutrition:  Orders Placed This Encounter   Procedures   • Diet NPO     Standing Status: Standing      Number of Occurrences: 1      Standing Expiration Date:      Order Specific Question:  Restrict to:     Answer:  With Tube Feed [4]     Medical Decision Making, by Problem:  Active Hospital Problems    Diagnosis   • Cardiac arrest (CMS-HCC) [I46.9]   • Atrial fibrillation (CMS-HCC) [I48.91]   • ESRD (end stage renal disease) (CMS-HCC) [N18.6]   • Acute respiratory failure with hypoxia (CMS-HCC) [J96.01]   • HTN (hypertension) [I10]   • IDDM (insulin dependent diabetes mellitus) (CMS-HCC)  [E11.9, Z79.4]   • Dyslipidemia [E78.5]   • Cardiomyopathy (CMS-HCC) [I42.9]   • Anemia [D64.9]   • Elevated troponin [R74.8]       Medications reviewed and Labs reviewed                      Assessment and Plan  1.ESRD/HD -MWF  2.HTN: BP well controlled  3.Electrolytes: well controlled.  4.Anemia: drop in Hb level -epogen with dialysisHb   5.S/p cardiac arrest  6.Volume:well controlled with UF/HD    Recs; HD MWF             All meds to jese doses             Epogen with HD

## 2017-05-28 NOTE — CARE PLAN
Problem: Communication  Goal: The ability to communicate needs accurately and effectively will improve  Outcome: PROGRESSING AS EXPECTED  Intervention: Use communication aids and/or /Language Line as appropriate  Pt speaks Pashto primarily

## 2017-05-28 NOTE — PROGRESS NOTES
Cortrak Placement    Tube Team verified patient name and medical record number prior to tube placement.  Cortrak tube (55 inches, 10 Macedonian) placed at 85 cm in right nare.  Per Cortrak picture, tube appears to be in the small bowel.  Nursing Instructions: Awaiting KUB to confirm placement before use for medications or feeding. Once placement confirmed, flush tube with 30 ml of water, and then remove and save stylet, in patient medication drawer.

## 2017-05-28 NOTE — RESPIRATORY CARE
Ventilator Weaning Update    Patient is on vent day 2.  SBT was tolerated for a minimum of  (10 minutes) hours on settings of 5 over 8.    Wean parameters for this SBT were:        Rapid Shallow Breathing Index (RR/VT): 124 (05/28/17 0750)      Events/Summary/Plan: placed pt on SPONT per protocol. PT RSBI >105 (124). placed pt back on rest settings. (05/28/17 0750)

## 2017-05-28 NOTE — ASSESSMENT & PLAN NOTE
Ischemic.   EF35%    Continue medical management and fluid management with HD  Carvedilol and lisinopril.

## 2017-05-28 NOTE — PROGRESS NOTES
Pulmonary Critical Care Progress Note        DOS:  5/28/2017    Chief Complaint: Cardiac Arrest    History of Present Illness: The entire history is obtained from healthcare providers, the medical record and the family at the bedside, as this lady cannot give me any history.  I was kindly asked by Dr. Holcomb to see and evaluate this lady regarding the above problems.  This is a 66-year-old lady who resides in [place], [place].  She has end-stage renal disease.  She is on thrice weekly hemodialysis.  Her last dialysis was Friday morning in [place].  She is a diabetic.  According to her son, the family traveled to [place] today for the weekend.  They were checking in to the [facility].  Apparently, she suddenly became unresponsive while sitting in a chair, awaiting hotel registration.  CPR was initiated in the field by bystanders.  An AED was placed on her, but it did not advise defibrillation.  EMS was activated.  She was brought [facility] to [facility].  She was in asystole.  She had return of spontaneous circulation with intravenous epinephrine and CPR.  She is now intubated on the ventilator.      ROS:    Respiratory: unable to perform due to the patient's inability to effectively communicate,   Cardiac: unable to perform due to the patient's inability to effectively communicate,   GI: unable to perform due to the patient's inability to effectively communicate.        Interval Events:  24 hour interval history reviewed   Follows off propofol  Fentanyl given for pain  Afib rate 110 this AM---> SR with ectopy PVCs  Troponin trending up, most recent 6.7  Cards evaluating, probable cardiac cath  I/O's -668cc/24hrs  Tmax 100.6, WBC normal, Tylenol given  55%---> 45% fiO2  Brown secretions   No abx  CXR shows: Worsening cardiomegaly and worse bilateral atx and effusions, possible pulmonary edema      Yesterday:  Discussed plan of care with 4 family members at bedside.   Tmax 97, WBC normal.    CXR shows cardiomegaly and mild pulmonary edema.   Prop off. Follows commands.   SR, hypertensive. No pressors. NS 10.   Echo pending.   0.61 troponin.   Nephrology aware, possible HD today.       PFSH:  No change.      Respiratory:  Ordaz Vent Mode: APVCMV, Rate (breaths/min): 16, Vt Target (mL): 320, PEEP/CPAP: 8, FiO2: 45, Static Compliance (ml / cm H2O): 31, Weaning Trial Stopped due to:: RSBI >105 (Rapid Shallow Breathing Index), Length of Weaning Trial (Hours):  (10 minutes), Control VTE (exp VT): 320  Pulse Oximetry: 95 % PIP 20  Exam: WOB mildly increased on full support. Clear anteriorly. Breath sounds absent right base. Few crackles at left base  Lots of thick secretions, blood tinged from ET Tube  ImagingAvailable data reviewed     Recent Labs      05/27/17   0206  05/27/17   1742  05/28/17   0455   ISTATAPH  7.305*  7.445  7.479   ISTATAPCO2  55.8*  43.1*  37.9*   ISTATAPO2  340*  60*  60*   ISTATATCO2  29  31  29   [id number]  100*  91*  92*   ISTATARTHCO3  27.8*  29.6*  28.1*   ISTATARTBE  1  5*  4*   ISTATTEMP  see below  37.0 C  38.1 C   ISTATFIO2  100  40  40   ISTATSPEC  Arterial  Arterial  Arterial   ISTATAPHTC   --   7.445  7.462   [id number]   --   60*  64       HemoDynamics:  Pulse: (!) 51, Heart Rate (Monitored): 94  NIBP: 146/47 mmHg    Exam: SR, RRR, frequent ectopy, no M. Heart tones distant. Good capillary refill and pulses.   Imaging: Available data reviewed     ECHO 5/27:  CONCLUSIONS  No prior study is available for comparison.   Normal left ventricular chamber size. Normal left ventricular wall thickness. Moderately reduced left ventricular systolic function. Left ventricular ejection fraction is visually estimated to be 35%. Normal   regional wall motion.   Grade II diastolic dysfunction.  Moderately dilated left atrium.  Moderate mitral regurgitation.  Estimated right ventricular systolic pressure is at least 45 mmHg.  IVC not visualized.      Recent Labs      05/27/17   0110   05/27/17   0710  05/27/17   1230  05/27/17   1800   TROPONINI  0.05*  0.61*  0.93*  1.67*   BNPBTYPENAT  988*   --    --    --        Neuro:  GCS Total Safford Coma Score: 15 11 with sedation vacation.   Prop 25.  Exam: PERRL, left cataract surgery noted, NFE, Per RN withdraws in all 4 with sedation vacation. No change  Imaging: Available data reviewed    Fluids:  Intake/Output       05/26/17 0700 - 05/27/17 0659 (Not Admitted) 05/27/17 0700 - 05/28/17 0659 05/28/17 0700 - 05/29/17 0659      7312-9579 2634-4642 Total 8186-3337 5735-7128 Total 3696-5001 9839-2595 Total       Intake    I.V.  --  -- --  428.2  228.8 657  --  -- --    Propofol Volume -- -- -- 208.2 128.8 337 -- -- --    IV Volume -- -- -- 120 100 220 -- -- --    ABX -- -- -- 100 -- 100 -- -- --    Dialysis  --  -- --  --  500 500  --  -- --    Dialysis Volume (Dialysis Intake) -- -- -- -- 500 500 -- -- --    Total Intake -- -- -- 428.2 728.8 1157 -- -- --       Output    Urine  --  -- --  325  -- 325  --  -- --    Indwelling Cathether -- -- -- 325 -- 325 -- -- --    Dialysis  --  -- --  --  1500 1500  --  -- --    Dialysis Output (Dialysis Output) -- -- -- -- 1500 1500 -- -- --    Total Output -- -- -- 325 1500 1825 -- -- --       Net I/O     -- -- -- 103.2 -771.2 -668.1 -- -- --        Weight: 71.8 kg (158 lb 4.6 oz)  Recent Labs      05/27/17   0110  05/27/17   1230   SODIUM  138  138   POTASSIUM  4.2  4.3   CHLORIDE  99  102   CO2  28  28   BUN  22  30*   CREATININE  4.50*  4.74*   MAGNESIUM  2.5   --    CALCIUM  9.4  8.7       GI/Nutrition:  Exam: Soft, NT/ND, +BS. No change  Imaging: Available data reviewed  NPO     Liver Function  Recent Labs      05/27/17   0110  05/27/17   1230   ALTSGPT  42   --    ASTSGOT  51*   --    ALKPHOSPHAT  77   --    TBILIRUBIN  0.6   --    LIPASE  36   --    GLUCOSE  139*  133*       Heme:  Recent Labs      05/27/17   0110  05/28/17   0452   RBC  3.86*  3.21*   HEMOGLOBIN  10.7*  8.7*   HEMATOCRIT  36.2*  28.0*    PLATELETCT  92*  107*   PROTHROMBTM  15.6*   --    APTT  34.1   --    INR  1.20*   --        Infectious Disease:  Monitored Temp  Av °C (98.6 °F)  Min: 36 °C (96.8 °F)  Max: 38.1 °C (100.6 °F)  Micro: reviewed     Recent Labs      17   0110  17   0452   WBC  10.5  9.5   NEUTSPOLYS  40.70*  80.80*   LYMPHOCYTES  47.80*  12.90*   MONOCYTES  6.20  4.50   EOSINOPHILS  0.90  1.00   BASOPHILS  0.00  0.40   ASTSGOT  51*   --    ALTSGPT  42   --    ALKPHOSPHAT  77   --    TBILIRUBIN  0.6   --      Current Facility-Administered Medications   Medication Dose Frequency Provider Last Rate Last Dose   • acetaminophen (TYLENOL) suppository 650 mg  650 mg Q6HRS PRN Dragan Martinez M.D.   650 mg at 17 0444   • Respiratory Care per Protocol   Continuous RT Dragan Martinez M.D.       • senna-docusate (PERICOLACE or SENOKOT S) 8.6-50 MG per tablet 2 Tab  2 Tab BID Dragan Martinez M.D.   Stopped at 17 0900    And   • polyethylene glycol/lytes (MIRALAX) PACKET 1 Packet  1 Packet QDAY PRN Dragan Martinez M.D.        And   • magnesium hydroxide (MILK OF MAGNESIA) suspension 30 mL  30 mL QDAY PRN Dragan Martinez M.D.        And   • bisacodyl (DULCOLAX) suppository 10 mg  10 mg QDAY PRN Dragan Martinez M.D.       • chlorhexidine (PERIDEX) 0.12 % solution 15 mL  15 mL BID Dragan Martinez M.D.   15 mL at 17 2131   • lidocaine (XYLOCAINE) 1%  injection  1-2 mL Q30 MIN PRN Dragan Martinez M.D.       • NS infusion   Continuous Dragan Martinez M.D. 10 mL/hr at 17 1074     • heparin injection 5,000 Units  5,000 Units Q8HRS Dragan Martinez M.D.   5,000 Units at 17 0443   • fentaNYL (SUBLIMAZE) injection 25 mcg  25 mcg Q HOUR PRN Dragan Martinez M.D.        Or   • fentaNYL (SUBLIMAZE) injection 50 mcg  50 mcg Q HOUR PRN Dragan Martinez M.D.   50 mcg at 17 0809    Or   • fentaNYL (SUBLIMAZE) injection 100 mcg  100  mcg Q HOUR PRN Dragan Martinez M.D.   100 mcg at 05/27/17 2101   • ipratropium-albuterol (DUONEB) nebulizer solution 3 mL  3 mL Q2HRS PRN (RT) Dragan Martinez M.D.       • famotidine (PEPCID) tablet 20 mg  20 mg DAILY Dragan Martinez M.D.   Stopped at 05/27/17 0900    Or   • famotidine (PEPCID) injection 20 mg  20 mg DAILY Dragan Martinez M.D.       • propofol (DIPRIVAN) injection  5-80 mcg/kg/min Continuous Dragan Martinez M.D. 13.1 mL/hr at 05/27/17 2315 30 mcg/kg/min at 05/27/17 2315   • enalaprilat (VASOTEC) injection 1.25 mg  1.25 mg Q6HRS PRN Dragan Sampson D.O.       • labetalol (NORMODYNE,TRANDATE) injection 10 mg  10 mg Q4HRS PRN Dragan Sampson D.O.       • ondansetron (ZOFRAN) syringe/vial injection 4 mg  4 mg Q4HRS PRN Dragan Sampson D.O.       • ondansetron (ZOFRAN ODT) dispertab 4 mg  4 mg Q4HRS PRN Dragan Sampson D.O.       • acetaminophen (TYLENOL) tablet 650 mg  650 mg Q6HRS PRN Dragan Sampson D.O.       • insulin lispro (HUMALOG) injection 1-6 Units  1-6 Units Q6HRS ISABEL Richards.O.   Stopped at 05/27/17 0600   • glucose 4 g chewable tablet 16 g  16 g Q15 MIN PRN ISABEL Richards.O.        And   • dextrose 50% (D50W) injection 25 mL  25 mL Q15 MIN PRN ISABEL Richards.O.       • albumin human 25% solution 12.5 g  12.5 g Q HOUR PRN Yahaira Bear M.D.         This patient's medications have not been reviewed.    Quality  Measures:  Labs reviewed, Medications reviewed and Radiology images reviewed  Patricia catheter: Critically Ill - Requiring Accurate Measurement of Urinary Output and Unconscious / Sedated Patient on a Ventilator      DVT Prophylaxis: Heparin  DVT prophylaxis - mechanical: SCDs  Ulcer prophylaxis: Yes    Assessed for rehab: Patient unable to tolerate rehabilitation therapeutic regimen      Assessment and Plan:  Ventilator-dependent respiratory failure.   Intubated AM 5/27   No SBT - not ready with prob RLL ATX/PNA   RT protocols  Abnormal CXR - RLL  ATX/pna   Initial film clear - min S/s of aspiration   5/28 film WORSE - no BS R base - FOB/Rx aspPNA   Cultures pending     Status post witnessed out-of-hospital asystolic cardiac arrest.  She had return of spontaneous circulation with epinephrine and cardiopulmonary resuscitation.   Cardiac consultation - primary  Cardiac arrest/NSTEMI?/valvular heart Dz/myop   Cath/EP?   Stat echo pending.-> NOTED   Serial Trops - low but trending up   ERP   Monitor for need for Amiodarone/heparin  Cardiomyopathy - EF 35%   Grade II DD  PHTN - mild-moderate RVSP 45   Secondary to MR? Moderate by ECHO  Query anoxic brain injury.  CT scan of the head shows no acute pathology.     With sedation vacation patient following well despite language barrier  Anemia with thrombocytopenia.   Serial lab - transfuse PRN  End-stage renal disease, on maintenance hemodialysis.   Renal consult   HD today?   Hold BP meds for more effective UF - Bp ok   Diabetes mellitus - glycemic control  Enteral nutrrition - Cortrak  ERP  Mobilize when safe  Prophylaxis     Repeat troponin x one  Cath per cardiologist when off vent - sooner in clinical scenarion suggest the need  FOB - eval for plugs/aspiration    FOB later in day c/w aspiration PNA - lots of purulent plugs especially R base - C3/Flagyl started secondary to PCN allergy - severe admitted to hosp 8-9 years ago for it per son - monitor for sepsis.  Lactic acid 0.9!  BX 2 sent    Discussed patient condition and risk of morbidity and/or mortality with Family, RN, RT, Pharmacy, Charge nurse / hot rounds and cardiology and nephrology.      Discussed plan of care with 4-5 family members at bedside.     The patient remains critically ill.  Critical care time = 35 minutes in directly providing and coordinating critical care and extensive data review.  No time overlap and excludes procedures.    IVanessa (Scribe), am scribing for, and in the presence of, Avinash Millan M.D.   Electronically  signed by: Vanessa Mehta (Scribe), 5/28/2017  IAvinash M.D.  personally performed the services described in this documentation, as scribed by Vanessa Mehta in my presence, and it is both accurate and complete.

## 2017-05-28 NOTE — CARE PLAN
Problem: Ventilation Defect:  Goal: Ability to achieve and maintain unassisted ventilation or tolerate decreased levels of ventilator support  Outcome: PROGRESSING AS EXPECTED  Adult Ventilation Update    Total Vent Days: 2      Patient Lines/Drains/Airways Status    Active Airway      Name: Placement date: Placement time: Site: Days:     Airway Group ET Tube Oral 7.5 @ 23 05/27/17    Oral  1               APVcmv  16  /  320  /  +8  /  45%  No Tx  Bedside Bronchoscopy performed by Dr. Millan 14:20    In the last 24 hours, the patient tolerated SBT for 5 minutes on settings of 5 over 8.    Rapid Shallow Breathing Index (RR/VT): 124 (05/28/17 0750)    Plateau Pressure (Q Shift): 21 (05/28/17 1428)  Static Compliance (ml / cm H2O): 26 (05/28/17 1428)    Patient failed trials because of Barriers to Wean: Other (Comments) (Post Bronch per Dr. Millan) (05/28/17 1428)    Cough: Productive (pt just bronched) (05/28/17 1428)  Sputum Amount: Scant (05/28/17 1428)  Sputum Color: White;Clear (05/28/17 1428)  Sputum Consistency: Thick;Thin (05/28/17 1428)    Events/Summary/Plan: vent check and bronch bedside by Dr. Millan, hold afternoon SBT per Dr. Millan (05/28/17 1428)

## 2017-05-28 NOTE — CARE PLAN
Problem: Ventilation Defect:  Goal: Ability to achieve and maintain unassisted ventilation or tolerate decreased levels of ventilator support  Adult Ventilation Update    Total Vent Days: 1      Patient Lines/Drains/Airways Status    Active Airway      Name: Placement date: Placement time: Site: Days:     Airway Group ET Tube Oral 7.5 05/27/17    Oral  less than 1               APVcmv 16, 320, +8, 40%     Plateau Pressure (Q Shift): 20 (05/27/17 0717)  Static Compliance (ml / cm H2O): 25.9 (05/27/17 1448)    Cough: Non Productive (05/27/17 1600)  Sputum Amount: Unable to Evaluate (05/27/17 0717)  Sputum Color: Unable to Evaluate (05/27/17 0717)  Sputum Consistency: Unable to Evaluate (05/27/17 0717)         Events/Summary/Plan: FiO2 titrated to 40%, no other vent changes made today, will continue to monitor

## 2017-05-28 NOTE — PROGRESS NOTES
Hemodialysis ordered by Dr. Bear. Treatment started at 1649 and ended at 1949. Pt stable, vss, no distress post tx. See flow sheets for details. Net UF 1.0 liter. Report to ALKA Kingston RN.  LEXI Rizo verified RT ua avf dressing CDI, no sign of bleeding noted.

## 2017-05-28 NOTE — CONSULTS
DATE OF SERVICE:  05/27/2017    DATE OF SERVICE:  05/27/2017    REQUESTING PHYSICIAN:  Ricardo Castaneda MD.    REASON FOR CONSULTATION:  To evaluate patient with end-stage renal disease,   provide dialysis while in the hospital.    HISTORY OF PRESENT ILLNESS:  The patient is a 66-year-old female with   end-stage renal disease, on hemodialysis, who has been receiving for   treatments in [place], [place] on Monday, Wednesday, Friday, last   treatment was completed on Friday, according to the family, the patient and   family were traveling from [place] down to [place] then up to the [place] and   patient overall been feeling well, upon checking in to hotel, all of sudden   lost consciousness and suffers from cardiac arrest.  EMS arrived performs CPR   and the patient got intubated, brought to emergency room for evaluation and   found to have electrolytes with potassium of 44.3.  Sodium 138.  Brain   natriuretic peptide 980.  Currently, patient is intubated and unresponsive in   intensive care unit.    REVIEW OF SYSTEMS:  Not possible to obtain as the patient is unresponsive,   intubated.    PAST MEDICAL HISTORY:  1.  End-stage renal disease, on hemodialysis.  2.  Hypertension.    PAST SURGICAL HISTORY:  AV fistula creation.    SOCIAL HISTORY:  The patient is  systems, lives in [place], she has 3   children.  No tobacco, alcohol or drug use.    FAMILY HISTORY:  None exact to the medical problems.    ALLERGIES:  HE IS ALLERGIC TO PENICILLIN.    OUTPATIENT MEDICATIONS:  Reviewed.    PHYSICAL EXAMINATION:  VITAL SIGNS:  Blood pressure 136/52, temperature 36 Celsius, pulse 57.  GENERAL APPEARANCE:  Well-developed, well-nourished female, in no acute   distress.  HEENT:  Normocephalic, atraumatic.  Pupils equal, round, reactive to light.    Endotracheal tube in place.  NECK:  No lymphadenopathy, no thyromegaly appreciated.  LUNGS:  Coarse breath sounds bilaterally.  No wheezes.  No rhonchi.  HEART:  Regular rate.  No  rub or gallop.  ABDOMEN:  Soft.  Bowel sounds present.  EXTREMITIES:  No cyanosis, no clubbing, no edema.  NEUROLOGIC:  The patient is unresponsive.    LABORATORY RESULTS:  Reviewed, revealed hemoglobin level 10.7.  Sodium 138,   potassium 4.3, BUN 30, and creatinine 4.7.    ASSESSMENT AND PLAN:  The patient is a 66-year-old female who is suffering   from cardiac arrest commenced with end-stage renal disease, on hemodialysis.  1.  End-stage renal disease.  We will dialyze patient today and continue per   her schedule Monday, Wednesday, and Friday.  2.  Electrolytes, remained well controlled.  3.  Volume elevated BNP.  We will ultrafiltrate patient on hemodialysis as   blood pressure tolerates.  4.  Hypertension.  Blood pressure remains well controlled.    RECOMMENDATIONS:  1.  Hemodialysis today, then Monday, Wednesday, Friday.  2.  Monitor basic metabolic panel.    All medications to adjust to renal doses.  Follow up blood pressure closely.    Thank you for the consult.       ____________________________________     MD ELSA HARRIS / ARGELIA    DD:  05/27/2017 14:44:45  DT:  05/27/2017 18:31:01    D#:  [id number]  Job#:  [id number]

## 2017-05-28 NOTE — ASSESSMENT & PLAN NOTE
Paroxysmal, continue amiodarone via cortrak and coreg   heparin drip, coumadin started.   Cardiology following

## 2017-05-28 NOTE — CONSULTS
Cardiology Consult Note:    Carleen To  Date & Time note created:    5/28/2017   7:48 AM     Referring MD:  Dr. Millan    Patient ID:   Name:             Tyler Thompson     YOB: 1950  Age:                 66 y.o.  female   MRN:               [id number]                                                             Chief Complaint / Reason for consult:  Cardiac arrest    History of Present Illness:    65 yo woman with prior history of DM, ESRD on HD, HTN, ?cardiomyopathy, was travelling to [place] from [place] and did not feel well after a long walk at the [facility]. She then sat down and passed out. EMS arrived and did not find any pulse, CPR was performed. Troponin peaked at 1.7. TTE shows EF of 30 %. Per her daughter's report who lives with her, patient does not have any cardiac problems in the past. No prior invasive angiogram done either. Patient is intubated at this time. Telemetry shows frequent PVCs. In and out atrial arrhythmias. Sinus for the most part. There was no documentation of ventricular arrhthymias during her event at the [facility]. It was likely a PEA arrest.    Review of Systems:      Intubated.    Past Medical History:   No past medical history on file.  Active Hospital Problems    Diagnosis   • Cardiac arrest (CMS-Formerly [facility]) [I46.9]     Priority: High   • Atrial fibrillation (CMS-Formerly [facility]) [I48.91]     Priority: Medium   • ESRD (end stage renal disease) (CMS-Formerly [facility]) [N18.6]     Priority: Medium   • Acute respiratory failure with hypoxia (CMS-Formerly [facility]) [J96.01]     Priority: Medium   • HTN (hypertension) [I10]   • IDDM (insulin dependent diabetes mellitus) (CMS-[facility]ly [facility]) [E11.9, Z79.4]   • Dyslipidemia [E78.5]   • Cardiomyopathy (CMS-[facility]) [I42.9]   • Anemia [D64.9]   • Elevated troponin [R74.8]       Past Surgical History:  No past surgical history on file.    Hospital Medications:    Current facility-administered medications:   •  acetaminophen (TYLENOL) suppository 650 mg, 650 mg, Rectal, Q6HRS PRNDragan  jose g Harman M.D., 650 mg at 05/28/17 0444  •  Respiratory Care per Protocol, , Nebulization, Continuous RT, Dragan Martinez M.D.  •  senna-docusate (PERICOLACE or SENOKOT S) 8.6-50 MG per tablet 2 Tab, 2 Tab, Oral, BID, Stopped at 05/27/17 0900 **AND** polyethylene glycol/lytes (MIRALAX) PACKET 1 Packet, 1 Packet, Oral, QDAY PRN **AND** magnesium hydroxide (MILK OF MAGNESIA) suspension 30 mL, 30 mL, Oral, QDAY PRN **AND** bisacodyl (DULCOLAX) suppository 10 mg, 10 mg, Rectal, QDAY PRN, Dragan Martinez M.D.  •  chlorhexidine (PERIDEX) 0.12 % solution 15 mL, 15 mL, Mouth/Throat, BID, Dragan Martinez M.D., 15 mL at 05/27/17 2131  •  lidocaine (XYLOCAINE) 1%  injection, 1-2 mL, Tracheal Tube, Q30 MIN PRN, Dragan Martinez M.D.  •  NS infusion, , Intravenous, Continuous, Dragan Martinez M.D., Last Rate: 10 mL/hr at 05/27/17 0354  •  heparin injection 5,000 Units, 5,000 Units, Subcutaneous, Q8HRS, Dragan Martinez M.D., 5,000 Units at 05/28/17 0443  •  fentaNYL (SUBLIMAZE) injection 25 mcg, 25 mcg, Intravenous, Q HOUR PRN **OR** fentaNYL (SUBLIMAZE) injection 50 mcg, 50 mcg, Intravenous, Q HOUR PRN, 50 mcg at 05/27/17 0809 **OR** fentaNYL (SUBLIMAZE) injection 100 mcg, 100 mcg, Intravenous, Q HOUR PRN, Dragan Martinez M.D., 100 mcg at 05/27/17 2101  •  ipratropium-albuterol (DUONEB) nebulizer solution 3 mL, 3 mL, Nebulization, Q2HRS PRN (RT), Dragan Martinez M.D.  •  famotidine (PEPCID) tablet 20 mg, 20 mg, Oral, DAILY, Stopped at 05/27/17 0900 **OR** famotidine (PEPCID) injection 20 mg, 20 mg, Intravenous, DAILY, Dragan Martinez M.D.  •  Action is required: Protocol 452 Propofol Critical Care has been implemented, , , CONTINUOUS **AND** propofol (DIPRIVAN) injection, 5-80 mcg/kg/min, Intravenous, Continuous, Last Rate: 17.4 mL/hr at 05/28/17 0635, 40 mcg/kg/min at 05/28/17 0635 **AND** Propofol Critical Care Protocol - Patient Must Have an Advanced Airway  with Mechanical Ventilation in Use., , , CONTINUOUS **AND** Propofol Critical Care Protocol - Spontaneous breathing trials may be attempted while receiving propofol, , , CONTINUOUS **AND** Propofol Critical Care Protocol - If patient meets extubation criteria, propofol MUST be discontinued prior to extubation, , , CONTINUOUS **AND** Propofol Critical Care Protocol - No allergy to propofol, soybean oil, or eggs, , , CONTINUOUS **AND** Propofol Critical Care Protocol - Do not administer this medication to children under the age of 12, , , CONTINUOUS **AND** Propofol Critical Care Protocol - Dedicated IV line for administration (vehicle supports rapid growth of microorganisms and incompatibilities cannot be detected), , , CONTINUOUS **AND** Propofol Critical Care Protocol - Administration tubing and any unused emulsion must be changed out and discarded after 12 hours from spiking vial, , , CONTINUOUS **AND** Propofol Critical Care Protocol - RASS guildelines, , , CONTINUOUS **AND** Propofol Critical Care Protocol - Wake-up assessment as ordered by MD, , , CONTINUOUS **AND** Propofol Critical Care Protocol - Propofol is not an analgesic, concurrent analgesia (if patient experiencing pain) should continue while patient is on propofol, , , CONTINUOUS **AND** Triglycerides Starting now and then Every 3 Days, , , Every 3 Days (0300) **AND** Propofol Critical Care Protocol - Notify MD prior to exceeding 80 mcg/kg/min and obtain new order for higher limit, , , CONTINUOUS, Dragan Martinez M.D.  •  enalaprilat (VASOTEC) injection 1.25 mg, 1.25 mg, Intravenous, Q6HRS PRN, Dragan Sampson D.O.  •  labetalol (NORMODYNE,TRANDATE) injection 10 mg, 10 mg, Intravenous, Q4HRS PRN, Dragan Sampson D.O.  •  ondansetron (ZOFRAN) syringe/vial injection 4 mg, 4 mg, Intravenous, Q4HRS PRN, ISABEL Richards.O.  •  ondansetron (ZOFRAN ODT) dispertab 4 mg, 4 mg, Oral, Q4HRS PRN, ISABEL Richards.O.  •  acetaminophen (TYLENOL) tablet 650  mg, 650 mg, Oral, Q6HRS PRN, Dragan Sampson D.O.  •  insulin lispro (HUMALOG) injection 1-6 Units, 1-6 Units, Subcutaneous, Q6HRS, Dragan Sampson D.O., Stopped at 05/27/17 0600  •  Action is required: Protocol 1073 Hypoglycemia has been implemented, , , Once **AND** Protocol 1073 Inclusion Criteria, , , CONTINUOUS **AND** Protocol 1073 NOTIFY, , , Once **AND** Protocol 1073 Initiate protocol immediately if FSBG is less than or equal to 70 mg/dL, , , CONTINUOUS **AND** glucose 4 g chewable tablet 16 g, 16 g, Oral, Q15 MIN PRN **AND** dextrose 50% (D50W) injection 25 mL, 25 mL, Intravenous, Q15 MIN PRN, Dragan Sampson D.O.  •  albumin human 25% solution 12.5 g, 12.5 g, Intravenous, Q HOUR PRN, Yahaira Bear M.D.    Current Outpatient Medications:  Prescriptions prior to admission   Medication Sig Dispense Refill Last Dose   • sevelamer (RENAGEL) 800 MG Tab Take 800 mg by mouth 3 times a day, with meals.   5/26/2017 at 1200   • insulin glargine (LANTUS) 100 UNIT/ML Solution Inject 29 Units as instructed every morning.   5/26/2017 at 0800   • pravastatin (PRAVACHOL) 40 MG tablet Take 40 mg by mouth every evening.   5/25/2017 at 2200   • amlodipine (NORVASC) 5 MG Tab Take 5 mg by mouth every bedtime.   5/25/2017 at 2200   • furosemide (LASIX) 40 MG Tab Take 40 mg by mouth every day.   5/26/2017 at 0800   • carvedilol (COREG) 12.5 MG Tab Take 12.5 mg by mouth 2 times a day, with meals.   5/26/2017 at 1800   • omeprazole (PRILOSEC) 20 MG delayed-release capsule Take 20 mg by mouth every day.   5/26/2017 at 0800   • Cholecalciferol (VITAMIN D) 2000 UNITS Cap Take 1 Cap by mouth every day.   5/27/2017 at 0800       Medication Allergy:  Allergies   Allergen Reactions   • Pcn [Penicillins]        Family History:  No family history on file.    Social History:  Social History     Social History   • Marital Status:      Spouse Name: N/A   • Number of Children: N/A   • Years of Education: N/A     Occupational History   • Not  "on file.     Social History Main Topics   • Smoking status: Not on file   • Smokeless tobacco: Not on file   • Alcohol Use: Not on file   • Drug Use: Not on file   • Sexual Activity: Not on file     Other Topics Concern   • Not on file     Social History Narrative   • No narrative on file         Physical Exam:  Vitals/ General Appearance:   Weight/BMI: Body mass index is 28.94 kg/(m^2).  Blood pressure 140/60, pulse 51, temperature 36.1 °C (97 °F), resp. rate 15, height 1.575 m (5' 2\"), weight 71.8 kg (158 lb 4.6 oz), SpO2 95 %, not currently breastfeeding.  Filed Vitals:    05/28/17 0235 05/28/17 0300 05/28/17 0400 05/28/17 0500   BP:       Pulse:  71 69 51   Temp:       Resp:  16 18 15   Height:       Weight:       SpO2: 96% 95%       Oxygen Therapy:  Pulse Oximetry: 95 %, FIO2%: 40, O2 Delivery: Ventilator    Constitutional:   Intubated status  HENMT:  Trach tube is in place properly  Eyes: No discharge.  Neck:  Unable to assess for JVD due to intubated status  Cardiovascular:  regular rhythm, No murmurs, No rubs, No gallops.   Extremities with  Mild edema.  Lungs:  +BS anteriorly  Abdomen: no distention  Skin: Warm  Neurologic: intubated status  Psychiatric: intubated status        MDM (Data Review):     Records reviewed and summarized in current documentation    Lab Data Review:  Recent Results (from the past 24 hour(s))   ACCU-CHEK GLUCOSE    Collection Time: 05/27/17 12:28 PM   Result Value Ref Range    Glucose - Accu-Ck 134 (H) 65 - 99 mg/dL   TROPONIN    Collection Time: 05/27/17 12:30 PM   Result Value Ref Range    Troponin I 0.93 (H) 0.00 - 0.04 ng/mL   HEPATITIS PANEL ACUTE(4 COMPONENTS)    Collection Time: 05/27/17 12:30 PM   Result Value Ref Range    Hepatitis B Surface Antigen Negative Negative    Hepatitis C Antibody Negative Negative    Hepatitis B Cors Ab,IgM Negative Negative    Hepatitis A Virus Ab, IgM Negative Negative   HEP B SURFACE AB    Collection Time: 05/27/17 12:30 PM   Result Value Ref " Range    Hep B Surface Antibody Quant 35.07 (H) 0.00 - 10.00 mIU/mL   BASIC METABOLIC PANEL    Collection Time: 05/27/17 12:30 PM   Result Value Ref Range    Sodium 138 135 - 145 mmol/L    Potassium 4.3 3.6 - 5.5 mmol/L    Chloride 102 96 - 112 mmol/L    Co2 28 20 - 33 mmol/L    Glucose 133 (H) 65 - 99 mg/dL    Bun 30 (H) 8 - 22 mg/dL    Creatinine 4.74 (H) 0.50 - 1.40 mg/dL    Calcium 8.7 8.5 - 10.5 mg/dL    Anion Gap 8.0 0.0 - 11.9   ESTIMATED GFR    Collection Time: 05/27/17 12:30 PM   Result Value Ref Range    GFR If  11 (A) >60 mL/min/1.73 m 2    GFR If Non African American 9 (A) >60 mL/min/1.73 m 2   ECHOCARDIOGRAM-COMP W/ CONT    Collection Time: 05/27/17  5:01 PM   Result Value Ref Range    Eject.Frac. MOD BP 29.5     Eject.Frac. MOD 4C 31.77     Eject.Frac. MOD 2C 35.67     Left Ventrical Ejection Fraction 35    ISTAT ARTERIAL BLOOD GAS    Collection Time: 05/27/17  5:42 PM   Result Value Ref Range    Ph 7.445 7.400 - 7.500    Pco2 43.1 (H) 26.0 - 37.0 mmHg    Po2 60 (L) 64 - 87 mmHg    Tco2 31 20 - 33 mmol/L    S02 91 (L) 93 - 99 %    Hco3 29.6 (H) 17.0 - 25.0 mmol/L    BE 5 (H) -4 - 3 mmol/L    Body Temp 37.0 C degrees    O2 Therapy 40 %    Ph Temp Soni 7.445 7.400 - 7.500    Pco2 Temp Co 43.1 (H) 26.0 - 37.0 mmHg    Po2 Temp Cor 60 (L) 64 - 87 mmHg    Specimen Arterial    ACCU-CHEK GLUCOSE    Collection Time: 05/27/17  5:52 PM   Result Value Ref Range    Glucose - Accu-Ck 106 (H) 65 - 99 mg/dL   TROPONIN    Collection Time: 05/27/17  6:00 PM   Result Value Ref Range    Troponin I 1.67 (H) 0.00 - 0.04 ng/mL   Lipid Profile (Lipid Panel) Fasting    Collection Time: 05/27/17 11:35 PM   Result Value Ref Range    Cholesterol,Tot 66 (L) 100 - 199 mg/dL    Triglycerides 148 0 - 149 mg/dL    HDL 27 (A) >=40 mg/dL    LDL 9 <100 mg/dL   CBC with Differential    Collection Time: 05/28/17  4:52 AM   Result Value Ref Range    WBC 9.5 4.8 - 10.8 K/uL    RBC 3.21 (L) 4.20 - 5.40 M/uL    Hemoglobin 8.7  (L) 12.0 - 16.0 g/dL    Hematocrit 28.0 (L) 37.0 - 47.0 %    MCV 87.2 81.4 - 97.8 fL    MCH 27.1 27.0 - 33.0 pg    MCHC 31.1 (L) 33.6 - 35.0 g/dL    RDW 57.7 (H) 35.9 - 50.0 fL    Platelet Count 107 (L) 164 - 446 K/uL    MPV 11.1 9.0 - 12.9 fL    Neutrophils-Polys 80.80 (H) 44.00 - 72.00 %    Lymphocytes 12.90 (L) 22.00 - 41.00 %    Monocytes 4.50 0.00 - 13.40 %    Eosinophils 1.00 0.00 - 6.90 %    Basophils 0.40 0.00 - 1.80 %    Immature Granulocytes 0.40 0.00 - 0.90 %    Nucleated RBC 0.00 /100 WBC    Neutrophils (Absolute) 7.70 (H) 2.00 - 7.15 K/uL    Lymphs (Absolute) 1.23 1.00 - 4.80 K/uL    Monos (Absolute) 0.43 0.00 - 0.85 K/uL    Eos (Absolute) 0.10 0.00 - 0.51 K/uL    Baso (Absolute) 0.04 0.00 - 0.12 K/uL    Immature Granulocytes (abs) 0.04 0.00 - 0.11 K/uL    NRBC (Absolute) 0.00 K/uL   Basic Metabolic Panel (BMP)    Collection Time: 05/28/17  4:52 AM   Result Value Ref Range    Sodium 138 135 - 145 mmol/L    Potassium 4.0 3.6 - 5.5 mmol/L    Chloride 101 96 - 112 mmol/L    Co2 28 20 - 33 mmol/L    Glucose 99 65 - 99 mg/dL    Bun 20 8 - 22 mg/dL    Creatinine 3.57 (H) 0.50 - 1.40 mg/dL    Calcium 8.6 8.5 - 10.5 mg/dL    Anion Gap 9.0 0.0 - 11.9   Magnesium    Collection Time: 05/28/17  4:52 AM   Result Value Ref Range    Magnesium 2.0 1.5 - 2.5 mg/dL   Phosphorus    Collection Time: 05/28/17  4:52 AM   Result Value Ref Range    Phosphorus 2.2 (L) 2.5 - 4.5 mg/dL   ESTIMATED GFR    Collection Time: 05/28/17  4:52 AM   Result Value Ref Range    GFR If  15 (A) >60 mL/min/1.73 m 2    GFR If Non  13 (A) >60 mL/min/1.73 m 2   ISTAT ARTERIAL BLOOD GAS    Collection Time: 05/28/17  4:55 AM   Result Value Ref Range    Ph 7.479 7.400 - 7.500    Pco2 37.9 (H) 26.0 - 37.0 mmHg    Po2 60 (L) 64 - 87 mmHg    Tco2 29 20 - 33 mmol/L    S02 92 (L) 93 - 99 %    Hco3 28.1 (H) 17.0 - 25.0 mmol/L    BE 4 (H) -4 - 3 mmol/L    Body Temp 38.1 C degrees    O2 Therapy 40 %    Ph Temp Soni 7.462 7.400  - 7.500    Pco2 Temp Co 39.7 (H) 26.0 - 37.0 mmHg    Po2 Temp Cor 64 64 - 87 mmHg    Specimen Arterial    EKG    Collection Time: 17  5:37 AM   Result Value Ref Range    Report       [facility]    Test Date:  2017  Pt Name:    PAPO LEES                     Department: 161  MRN:        [id number]                      Room:       [facility]  Gender:     F                            Technician: JAG  :        [id number]                   Requested By:J CARLOS AKINS  Order #:    [id number]                    Reading MD:    Measurements  Intervals                                Axis  Rate:       84                           P:          50  MT:         172                          QRS:        10  QRSD:       82                           T:          -59  QT:         396  QTc:        469    Interpretive Statements  SINUS RHYTHM  RUN OF VENTRICULAR PREMATURE COMPLEXES  ABERRANT COMPLEX, POSSIBLY SUPRAVENTRICULAR  NONSPECIFIC T ABNORMALITIES, INFERIOR LEADS  Compared to ECG 2017 01:30:00  Ventricular premature complex(es) now present  Aberrant conduction of supraventricular beat(s) now present  T-wave abnormality now present  Sinus bradycardia no longer pres ent         Imaging/Procedures Review:    Chest Xray:  Reviewed    EKG:   As in HPI.     MDM (Assessment and Plan):     Active Hospital Problems    Diagnosis   • Cardiac arrest (CMS-HCC) [I46.9]     Priority: High   • Atrial fibrillation (CMS-HCC) [I48.91]     Priority: Medium   • ESRD (end stage renal disease) (CMS-HCC) [N18.6]     Priority: Medium   • Acute respiratory failure with hypoxia (CMS-HCC) [J96.01]     Priority: Medium   • HTN (hypertension) [I10]   • IDDM (insulin dependent diabetes mellitus) (CMS-HCC) [E11.9, Z79.4]   • Dyslipidemia [E78.5]   • Cardiomyopathy (CMS-HCC) [I42.9]   • Anemia [D64.9]   • Elevated troponin [R74.8]       At this time, per primary team, she will be extubated today.  I think it would be reasonable for us to  further evaluate for ischemic work up with an invasive coronary angiogram.  But we will wait for her to be extubated first.  Optimize electrolytes to keep Mg above 2.5 and Potassium above 4.5    Thank you for referring this patient to our cardiology service.  We will follow patient with you.    Carleen Stewart MD.  [facility].

## 2017-05-28 NOTE — PROGRESS NOTES
"Hospital Medicine Interval Note  Date of Service:  5/28/2017    Chief Complaint  66 y.o.-year-old female admitted 5/27/2017 with cardiac arrest.    Interval Problem Update  Ms. Thompson has a hx of IDDM, ESRD, and HTN that experienced an out of hospital arrest with ROSC.   Tmax 100.6 On propofol and remains on the vent.  She appears to be in pain from CPR. She went into afib with a rate up to 110 and has converted now back to sinus. I met with her son and gave him an update.  Consultants/Specialty  Pulmonology  Cardiology  Nephrology    I discussed with Dr. Stewart and Dr. Millan today.  Disposition  ICU.     Review of Systems   Unable to perform ROS: intubated      Physical Exam Laboratory/Imaging   Filed Vitals:    05/28/17 0235 05/28/17 0300 05/28/17 0400 05/28/17 0500   BP:       Pulse:  71 69 51   Temp:       Resp:  16 18 15   Height:       Weight:       SpO2: 96% 95%       Physical Exam   Constitutional: No distress.   HENT:   Head: Atraumatic.   Neck:   Central line   Cardiovascular: Regular rhythm.    No murmur heard.  Pulmonary/Chest: She has rales.   Vent, moderate air movement bilat   Abdominal: Soft. She exhibits no distension.   Musculoskeletal: She exhibits no edema.   Fistula with a \"thrill\"   Neurological:   Sedated on propofol, she opens her eyes to stimulation but does not follow.   Skin: Skin is warm. There is pallor.    Lab Results   Component Value Date/Time    WBC 9.5 05/28/2017 04:52 AM    HEMOGLOBIN 8.7* 05/28/2017 04:52 AM    HEMATOCRIT 28.0* 05/28/2017 04:52 AM    PLATELET COUNT 107* 05/28/2017 04:52 AM     Lab Results   Component Value Date/Time    SODIUM 138 05/28/2017 04:52 AM    POTASSIUM 4.0 05/28/2017 04:52 AM    CHLORIDE 101 05/28/2017 04:52 AM    CO2 28 05/28/2017 04:52 AM    GLUCOSE 99 05/28/2017 04:52 AM    BUN 20 05/28/2017 04:52 AM    CREATININE 3.57* 05/28/2017 04:52 AM      Assessment/Plan    Cardiac arrest (CMS-HCC) (present on admission)  Assessment & Plan  With return of " spontaneous circulation.    To cardiology consulted. Likely heart cath when stabilized.    ESRD (end stage renal disease) (CMS-HCC) (present on admission)  Assessment & Plan  Nephrology consulted. On dialysis.    Acute respiratory failure with hypoxia (CMS-[facility]) (present on admission)  Assessment & Plan  Initiated on the vent 5/27, pulmonary consultation.    Atrial fibrillation (CMS-HCC) (present on admission)  Assessment & Plan  New onset. Paroxysmal. With rapid ventricular response. I will d/w cardiology about IV heparin drip.    Anemia (present on admission)  Assessment & Plan  Likely chronic renal dz    Elevated troponin (present on admission)  Assessment & Plan  Trop 0.6    HTN (hypertension) (present on admission)  Assessment & Plan  On coreg.    IDDM (insulin dependent diabetes mellitus) (CMS-HCC) (present on admission)  Assessment & Plan  Home Lantus dose is 29 units.    Dyslipidemia (present on admission)  Assessment & Plan  On a statin.    Cardiomyopathy (CMS-HCC) (present on admission)  Assessment & Plan    Echo:  CONCLUSIONS  No prior study is available for comparison.   Normal left ventricular chamber size. Normal left ventricular wall   thickness. Moderately reduced left ventricular systolic function. Left   ventricular ejection fraction is visually estimated to be 35%. Normal   regional wall motion. Grade II diastolic dysfunction.  Moderately dilated left atrium.  Moderate mitral regurgitation.  Estimated right ventricular systolic pressure is at least 45 mmHg.  IVC not visualized.       Labs reviewed and Medications reviewed        DVT Prophylaxis: Heparin

## 2017-05-29 ENCOUNTER — APPOINTMENT (OUTPATIENT)
Dept: RADIOLOGY | Facility: MEDICAL CENTER | Age: 67
DRG: 264 | End: 2017-05-29
Attending: INTERNAL MEDICINE
Payer: MEDICARE

## 2017-05-29 ENCOUNTER — APPOINTMENT (OUTPATIENT)
Dept: RADIOLOGY | Facility: MEDICAL CENTER | Age: 67
DRG: 264 | End: 2017-05-29
Attending: HOSPITALIST
Payer: MEDICARE

## 2017-05-29 LAB
ALBUMIN SERPL BCP-MCNC: 3.1 G/DL (ref 3.2–4.9)
ALBUMIN/GLOB SERPL: 1 G/DL
ALP SERPL-CCNC: 51 U/L (ref 30–99)
ALT SERPL-CCNC: 25 U/L (ref 2–50)
ANION GAP SERPL CALC-SCNC: 9 MMOL/L (ref 0–11.9)
APTT PPP: 231.2 SEC (ref 24.7–36)
APTT PPP: 36.6 SEC (ref 24.7–36)
AST SERPL-CCNC: 21 U/L (ref 12–45)
BASE EXCESS BLDA CALC-SCNC: 1 MMOL/L (ref -4–3)
BASOPHILS # BLD AUTO: 0.5 % (ref 0–1.8)
BASOPHILS # BLD: 0.05 K/UL (ref 0–0.12)
BILIRUB SERPL-MCNC: 0.9 MG/DL (ref 0.1–1.5)
BODY TEMPERATURE: ABNORMAL DEGREES
BUN SERPL-MCNC: 35 MG/DL (ref 8–22)
CALCIUM SERPL-MCNC: 8.6 MG/DL (ref 8.5–10.5)
CHLORIDE SERPL-SCNC: 103 MMOL/L (ref 96–112)
CO2 BLDA-SCNC: 27 MMOL/L (ref 20–33)
CO2 SERPL-SCNC: 26 MMOL/L (ref 20–33)
CREAT SERPL-MCNC: 4.71 MG/DL (ref 0.5–1.4)
CRP SERPL HS-MCNC: 15.33 MG/DL (ref 0–0.75)
EOSINOPHIL # BLD AUTO: 0.16 K/UL (ref 0–0.51)
EOSINOPHIL NFR BLD: 1.5 % (ref 0–6.9)
ERYTHROCYTE [DISTWIDTH] IN BLOOD BY AUTOMATED COUNT: 58.1 FL (ref 35.9–50)
GFR SERPL CREATININE-BSD FRML MDRD: 9 ML/MIN/1.73 M 2
GLOBULIN SER CALC-MCNC: 3.1 G/DL (ref 1.9–3.5)
GLUCOSE BLD-MCNC: 110 MG/DL (ref 65–99)
GLUCOSE BLD-MCNC: 130 MG/DL (ref 65–99)
GLUCOSE BLD-MCNC: 95 MG/DL (ref 65–99)
GLUCOSE BLD-MCNC: 98 MG/DL (ref 65–99)
GLUCOSE SERPL-MCNC: 104 MG/DL (ref 65–99)
HCO3 BLDA-SCNC: 25.5 MMOL/L (ref 17–25)
HCT VFR BLD AUTO: 27.3 % (ref 37–47)
HGB BLD-MCNC: 8.5 G/DL (ref 12–16)
IMM GRANULOCYTES # BLD AUTO: 0.05 K/UL (ref 0–0.11)
IMM GRANULOCYTES NFR BLD AUTO: 0.5 % (ref 0–0.9)
INR PPP: 1.28 (ref 0.87–1.13)
LYMPHOCYTES # BLD AUTO: 0.75 K/UL (ref 1–4.8)
LYMPHOCYTES NFR BLD: 7 % (ref 22–41)
MAGNESIUM SERPL-MCNC: 2.1 MG/DL (ref 1.5–2.5)
MCH RBC QN AUTO: 27.2 PG (ref 27–33)
MCHC RBC AUTO-ENTMCNC: 31.1 G/DL (ref 33.6–35)
MCV RBC AUTO: 87.2 FL (ref 81.4–97.8)
MONOCYTES # BLD AUTO: 0.51 K/UL (ref 0–0.85)
MONOCYTES NFR BLD AUTO: 4.8 % (ref 0–13.4)
NEUTROPHILS # BLD AUTO: 9.2 K/UL (ref 2–7.15)
NEUTROPHILS NFR BLD: 85.7 % (ref 44–72)
NRBC # BLD AUTO: 0 K/UL
NRBC BLD AUTO-RTO: 0 /100 WBC
O2/TOTAL GAS SETTING VFR VENT: 45 %
PCO2 BLDA: 40.1 MMHG (ref 26–37)
PCO2 TEMP ADJ BLDA: 41 MMHG (ref 26–37)
PH BLDA: 7.41 [PH] (ref 7.4–7.5)
PH TEMP ADJ BLDA: 7.4 [PH] (ref 7.4–7.5)
PHOSPHATE SERPL-MCNC: 4 MG/DL (ref 2.5–4.5)
PLATELET # BLD AUTO: 95 K/UL (ref 164–446)
PMV BLD AUTO: 10.6 FL (ref 9–12.9)
PO2 BLDA: 77 MMHG (ref 64–87)
PO2 TEMP ADJ BLDA: 80 MMHG (ref 64–87)
POTASSIUM SERPL-SCNC: 4.1 MMOL/L (ref 3.6–5.5)
PREALB SERPL-MCNC: 12 MG/DL (ref 18–38)
PROT SERPL-MCNC: 6.2 G/DL (ref 6–8.2)
PROTHROMBIN TIME: 16.4 SEC (ref 12–14.6)
RBC # BLD AUTO: 3.13 M/UL (ref 4.2–5.4)
RHODAMINE-AURAMINE STN SPEC: NORMAL
RHODAMINE-AURAMINE STN SPEC: NORMAL
SAO2 % BLDA: 95 % (ref 93–99)
SIGNIFICANT IND 70042: NORMAL
SIGNIFICANT IND 70042: NORMAL
SITE SITE: NORMAL
SITE SITE: NORMAL
SODIUM SERPL-SCNC: 138 MMOL/L (ref 135–145)
SOURCE SOURCE: NORMAL
SOURCE SOURCE: NORMAL
SPECIMEN DRAWN FROM PATIENT: ABNORMAL
TRIGL SERPL-MCNC: 87 MG/DL (ref 0–149)
WBC # BLD AUTO: 10.7 K/UL (ref 4.8–10.8)

## 2017-05-29 PROCEDURE — 90935 HEMODIALYSIS ONE EVALUATION: CPT

## 2017-05-29 PROCEDURE — 99233 SBSQ HOSP IP/OBS HIGH 50: CPT | Performed by: HOSPITALIST

## 2017-05-29 PROCEDURE — 770022 HCHG ROOM/CARE - ICU (200)

## 2017-05-29 PROCEDURE — 36600 WITHDRAWAL OF ARTERIAL BLOOD: CPT

## 2017-05-29 PROCEDURE — 71275 CT ANGIOGRAPHY CHEST: CPT

## 2017-05-29 PROCEDURE — 82962 GLUCOSE BLOOD TEST: CPT

## 2017-05-29 PROCEDURE — 80053 COMPREHEN METABOLIC PANEL: CPT

## 2017-05-29 PROCEDURE — 71010 DX-CHEST-PORTABLE (1 VIEW): CPT

## 2017-05-29 PROCEDURE — 700117 HCHG RX CONTRAST REV CODE 255: Performed by: INTERNAL MEDICINE

## 2017-05-29 PROCEDURE — 84100 ASSAY OF PHOSPHORUS: CPT

## 2017-05-29 PROCEDURE — 99291 CRITICAL CARE FIRST HOUR: CPT | Performed by: INTERNAL MEDICINE

## 2017-05-29 PROCEDURE — 700111 HCHG RX REV CODE 636 W/ 250 OVERRIDE (IP): Performed by: INTERNAL MEDICINE

## 2017-05-29 PROCEDURE — 700111 HCHG RX REV CODE 636 W/ 250 OVERRIDE (IP): Performed by: HOSPITALIST

## 2017-05-29 PROCEDURE — 82803 BLOOD GASES ANY COMBINATION: CPT

## 2017-05-29 PROCEDURE — A9270 NON-COVERED ITEM OR SERVICE: HCPCS | Performed by: INTERNAL MEDICINE

## 2017-05-29 PROCEDURE — 700102 HCHG RX REV CODE 250 W/ 637 OVERRIDE(OP): Performed by: INTERNAL MEDICINE

## 2017-05-29 PROCEDURE — 700105 HCHG RX REV CODE 258: Performed by: INTERNAL MEDICINE

## 2017-05-29 PROCEDURE — 85730 THROMBOPLASTIN TIME PARTIAL: CPT

## 2017-05-29 PROCEDURE — 84134 ASSAY OF PREALBUMIN: CPT

## 2017-05-29 PROCEDURE — 84478 ASSAY OF TRIGLYCERIDES: CPT

## 2017-05-29 PROCEDURE — 85025 COMPLETE CBC W/AUTO DIFF WBC: CPT

## 2017-05-29 PROCEDURE — 86140 C-REACTIVE PROTEIN: CPT

## 2017-05-29 PROCEDURE — 94003 VENT MGMT INPAT SUBQ DAY: CPT

## 2017-05-29 PROCEDURE — 700101 HCHG RX REV CODE 250: Performed by: INTERNAL MEDICINE

## 2017-05-29 PROCEDURE — 83735 ASSAY OF MAGNESIUM: CPT

## 2017-05-29 PROCEDURE — 85610 PROTHROMBIN TIME: CPT

## 2017-05-29 RX ORDER — AMIODARONE HYDROCHLORIDE 200 MG/1
200 TABLET ORAL
Status: DISCONTINUED | OUTPATIENT
Start: 2017-06-13 | End: 2017-06-07 | Stop reason: HOSPADM

## 2017-05-29 RX ORDER — HEPARIN SODIUM 1000 [USP'U]/ML
2600 INJECTION, SOLUTION INTRAVENOUS; SUBCUTANEOUS PRN
Status: DISCONTINUED | OUTPATIENT
Start: 2017-05-29 | End: 2017-06-07 | Stop reason: HOSPADM

## 2017-05-29 RX ORDER — METRONIDAZOLE 500 MG/1
500 TABLET ORAL EVERY 8 HOURS
Status: COMPLETED | OUTPATIENT
Start: 2017-05-29 | End: 2017-06-01

## 2017-05-29 RX ORDER — AMIODARONE HYDROCHLORIDE 200 MG/1
200 TABLET ORAL TWICE DAILY
Status: DISCONTINUED | OUTPATIENT
Start: 2017-06-05 | End: 2017-06-07 | Stop reason: HOSPADM

## 2017-05-29 RX ORDER — ATORVASTATIN CALCIUM 40 MG/1
40 TABLET, FILM COATED ORAL
Status: DISCONTINUED | OUTPATIENT
Start: 2017-05-29 | End: 2017-06-07 | Stop reason: HOSPADM

## 2017-05-29 RX ORDER — HEPARIN SODIUM 1000 [USP'U]/ML
5000 INJECTION, SOLUTION INTRAVENOUS; SUBCUTANEOUS ONCE
Status: COMPLETED | OUTPATIENT
Start: 2017-05-29 | End: 2017-05-29

## 2017-05-29 RX ORDER — AMIODARONE HYDROCHLORIDE 200 MG/1
400 TABLET ORAL TWICE DAILY
Status: COMPLETED | OUTPATIENT
Start: 2017-05-29 | End: 2017-06-05

## 2017-05-29 RX ADMIN — PROPOFOL 25 MCG/KG/MIN: 10 INJECTION, EMULSION INTRAVENOUS at 17:11

## 2017-05-29 RX ADMIN — METRONIDAZOLE 500 MG: 500 INJECTION, SOLUTION INTRAVENOUS at 05:37

## 2017-05-29 RX ADMIN — HEPARIN SODIUM 5000 UNITS: 5000 INJECTION, SOLUTION INTRAVENOUS; SUBCUTANEOUS at 05:37

## 2017-05-29 RX ADMIN — CHLORHEXIDINE GLUCONATE 15 ML: 1.2 RINSE ORAL at 09:41

## 2017-05-29 RX ADMIN — ATORVASTATIN CALCIUM 40 MG: 40 TABLET, FILM COATED ORAL at 22:44

## 2017-05-29 RX ADMIN — METRONIDAZOLE 500 MG: 500 TABLET ORAL at 14:30

## 2017-05-29 RX ADMIN — AMIODARONE HYDROCHLORIDE 400 MG: 200 TABLET ORAL at 22:44

## 2017-05-29 RX ADMIN — HEPARIN SODIUM 5000 UNITS: 1000 INJECTION, SOLUTION INTRAVENOUS; SUBCUTANEOUS at 11:46

## 2017-05-29 RX ADMIN — FENTANYL CITRATE 50 MCG: 50 INJECTION, SOLUTION INTRAMUSCULAR; INTRAVENOUS at 22:44

## 2017-05-29 RX ADMIN — CARVEDILOL 12.5 MG: 12.5 TABLET, FILM COATED ORAL at 09:42

## 2017-05-29 RX ADMIN — HEPARIN SODIUM 1050 UNITS: 5000 INJECTION, SOLUTION INTRAVENOUS at 11:47

## 2017-05-29 RX ADMIN — IOHEXOL 75 ML: 350 INJECTION, SOLUTION INTRAVENOUS at 16:15

## 2017-05-29 RX ADMIN — PROPOFOL 25 MCG/KG/MIN: 10 INJECTION, EMULSION INTRAVENOUS at 10:30

## 2017-05-29 RX ADMIN — STANDARDIZED SENNA CONCENTRATE AND DOCUSATE SODIUM 2 TABLET: 8.6; 5 TABLET, FILM COATED ORAL at 09:41

## 2017-05-29 RX ADMIN — STANDARDIZED SENNA CONCENTRATE AND DOCUSATE SODIUM 2 TABLET: 8.6; 5 TABLET, FILM COATED ORAL at 19:47

## 2017-05-29 RX ADMIN — PROPOFOL 25 MCG/KG/MIN: 10 INJECTION, EMULSION INTRAVENOUS at 02:36

## 2017-05-29 RX ADMIN — METRONIDAZOLE 500 MG: 500 TABLET ORAL at 22:44

## 2017-05-29 RX ADMIN — FAMOTIDINE 20 MG: 20 TABLET, FILM COATED ORAL at 09:42

## 2017-05-29 RX ADMIN — CHLORHEXIDINE GLUCONATE 15 ML: 1.2 RINSE ORAL at 19:47

## 2017-05-29 RX ADMIN — CEFTRIAXONE SODIUM 1 G: 1 INJECTION, POWDER, FOR SOLUTION INTRAMUSCULAR; INTRAVENOUS at 09:41

## 2017-05-29 NOTE — RESPIRATORY CARE
Ventilator Weaning Update    Patient is on vent day 3.  SBT was tolerated for a minimum of  (25 min) on settings of 5 over 8.    Wean parameters for this SBT were:        Rapid Shallow Breathing Index (RR/VT): 114 (05/29/17 0759)  RR (bpm): 28 (05/29/17 0759)  Spontaneous VE: 5.9 (05/29/17 0759)  Spontaneous VT: 215 (05/29/17 0759)       Events/Summary/Plan: pt RSBI 114, placed pt back on rest settings RN aware (05/29/17 0759)

## 2017-05-29 NOTE — PROGRESS NOTES
"Hospital Medicine Interval Note  Date of Service:  5/29/2017    Chief Complaint  66 y.o.-year-old female admitted 5/27/2017 with cardiac arrest.    Interval Problem Update  Ms. Thompson has a hx of IDDM, ESRD, and HTN that experienced an out of hospital arrest with ROSC.   On propofol 25and remains on the vent.   She went into afib with a rate up to 110 and has converted multiple times in and out of sinus and afib. I met with her son and gave him an update. SBT 25 minutes. Bronchoscopy was done 5/28.  Consultants/Specialty  Pulmonology  Cardiology  Nephrology    I discussed with Dr. Man today on Hot Rounds.  Disposition  ICU.     Review of Systems   Unable to perform ROS: intubated      Physical Exam Laboratory/Imaging   Filed Vitals:    05/29/17 0600 05/29/17 0726 05/29/17 0733 05/29/17 0759   BP:       Pulse: 63      Temp:       Resp: 21      Height:       Weight:       SpO2: 100% 99% 98% 98%     Physical Exam   Constitutional: No distress.   HENT:   Head: Atraumatic.   Neck:   Central line   Cardiovascular: Regular rhythm.    No murmur heard.  Pulmonary/Chest: She has rales.   Vent, moderate air movement bilat   Abdominal: Soft. She exhibits no distension.   Musculoskeletal: She exhibits no edema.   Fistula with a \"thrill\"   Neurological:   Sedated on propofol, she opens her eyes to stimulation but does not follow.   Skin: Skin is warm. There is pallor.    Lab Results   Component Value Date/Time    WBC 10.7 05/29/2017 04:40 AM    HEMOGLOBIN 8.5* 05/29/2017 04:40 AM    HEMATOCRIT 27.3* 05/29/2017 04:40 AM    PLATELET COUNT 95* 05/29/2017 04:40 AM     Lab Results   Component Value Date/Time    SODIUM 138 05/29/2017 04:40 AM    POTASSIUM 4.1 05/29/2017 04:40 AM    CHLORIDE 103 05/29/2017 04:40 AM    CO2 26 05/29/2017 04:40 AM    GLUCOSE 104* 05/29/2017 04:40 AM    BUN 35* 05/29/2017 04:40 AM    CREATININE 4.71* 05/29/2017 04:40 AM      Assessment/Plan    Cardiac arrest (CMS-HCC) (present on admission)  Assessment & " Plan  With return of spontaneous circulation. Checked CTA to eval for PE negative.   Cardiology consulted. Likely heart cath when stabilized.    ESRD (end stage renal disease) (CMS-[facility]) (present on admission)  Assessment & Plan  Nephrology consulted. On dialysis.    Acute respiratory failure with hypoxia (CMS-[facility]) (present on admission)  Assessment & Plan  Initiated on the vent 5/27, pulmonary consultation.    Atrial fibrillation (CMS-[facility]) (present on admission)  Assessment & Plan  New onset. Paroxysmal. With rapid ventricular response.  IV heparin drip started. Hold on coumadin until after heart cath.    Anemia (present on admission)  Assessment & Plan  Likely chronic renal dz    Elevated troponin (present on admission)  Assessment & Plan  Trop 0.6    HTN (hypertension) (present on admission)  Assessment & Plan  On coreg.    IDDM (insulin dependent diabetes mellitus) (CMS-[facility]) (present on admission)  Assessment & Plan  Home Lantus dose is 29 units.    Dyslipidemia (present on admission)  Assessment & Plan  On a statin.    Cardiomyopathy (CMS-[facility]) (present on admission)  Assessment & Plan    Echo:  CONCLUSIONS  No prior study is available for comparison.   Normal left ventricular chamber size. Normal left ventricular wall   thickness. Moderately reduced left ventricular systolic function. Left   ventricular ejection fraction is visually estimated to be 35%. Normal   regional wall motion. Grade II diastolic dysfunction.  Moderately dilated left atrium.  Moderate mitral regurgitation.  Estimated right ventricular systolic pressure is at least 45 mmHg.  IVC not visualized.    Aspiration pneumonia (CMS-[facility]) (present on admission)  Assessment & Plan  From admit. S/p bronchoscopy 5/28. Empiric rocephin/flagyl.    CT chest:  1.  No CT evidence of pulmonary embolism.    2.  Bilateral pleural effusions, larger on the right side.    3.  Consolidation noted in the lower pulmonary lobes bilaterally which could be due to  pneumonia or consolidative atelectasis.    4.  Other small scattered areas of pulmonary opacification are noted in each lung. These are likely due to inflammation or infection       Labs reviewed and Medications reviewed        DVT Prophylaxis: Heparin

## 2017-05-29 NOTE — PROGRESS NOTES
Pt in atrial fibrillation on monitor, rate 90-95, notified by monitor tech of rate change at 10:45.  Dr. Castaneda notified, orders received to initiate heparin drip per weight based protocol.  Family members at bedside were updated on pt condition and plan of care.

## 2017-05-29 NOTE — CARE PLAN
Problem: Bowel/Gastric:  Goal: Will not experience complications related to bowel motility  Intervention: Assess baseline bowel pattern  No BM today, pt remains on bowel protocol.  Tube feeds started per order, pt tolerating thus far without emesis or abdominal distention.

## 2017-05-29 NOTE — PROGRESS NOTES
Pulmonary Critical Care Progress Note        Date of service: 5/29/2017    Chief Complaint: Cardiac Arrest    History of Present Illness: The entire history is obtained from healthcare providers, the medical record and the family at the bedside, as this lady cannot give me any history.  I was kindly asked by Dr. Holcomb to see and evaluate this lady regarding the above problems.  This is a 66-year-old lady who resides in [place], [place].  She has end-stage renal disease.  She is on thrice weekly hemodialysis.  Her last dialysis was Friday morning in [place].  She is a diabetic.  According to her son, the family traveled to [place] today for the weekend.  They were checking in to the [facility].  Apparently, she suddenly became unresponsive while sitting in a chair, awaiting hotel registration.  CPR was initiated in the field by bystanders.  An AED was placed on her, but it did not advise defibrillation.  EMS was activated.  She was brought here to [facility].  She was in asystole.  She had return of spontaneous circulation with intravenous epinephrine and CPR.  She is now intubated on the ventilator.      ROS:    Respiratory: unable to perform due to the patient's inability to effectively communicate,   Cardiac: unable to perform due to the patient's inability to effectively communicate,   GI: unable to perform due to the patient's inability to effectively communicate.        Interval Events:  24 hour interval history reviewed     On prop 25  Occasional PVCs and Afib  Cortrak placed today  On hemodialysis M/W/F  Patricia placed  PEEP 8 and 45%  Respiratory rate 16  Tmax 37°C.  Bronched yesterday  CXR left lower lobe atelectasis, mild pulmonary congestion, otherwise unchanged and improved from yesterday.    SBT yesterday with RSBI 114.    Physical Exam  General: Sedate on propofol 25, Intermittently follows for family  Neuro/Psych: Pupils 2 on the left and 3 on the right,  sluggish  HEENT: Right nare coretrak, Supple, midline, no crepitus. Right IJ CVC CDI  CVS: Irregular, no murmur  Respiratory: ET tube in place, Clear anteriorly, no wheezing  Abdomen/: Soft, NT, bowel sounds present  Extremities: Distal pulses 2+ bilateral. Trace edema.  Skin: Cool, dry, perfuse.     PFSH:  No change.      Respiratory:  Ordaz Vent Mode: APVCMV, Rate (breaths/min): 16, Vt Target (mL): 320, PEEP/CPAP: 8, FiO2: 45, Static Compliance (ml / cm H2O): 34, Weaning Trial Stopped due to:: RSBI >105 (Rapid Shallow Breathing Index), Length of Weaning Trial (Hours):  (25 min), Control VTE (exp VT): 315  Pulse Oximetry: 98 % PIP 20  ImagingAvailable data reviewed     Recent Labs      05/27/17   1742  05/28/17   0455  05/29/17   0447   ISTATAPH  7.445  7.479  7.411   ISTATAPCO2  43.1*  37.9*  40.1*   ISTATAPO2  60*  60*  77   ISTATATCO2  31  29  27   AZKINOB3ESW  91*  92*  95   ISTATARTHCO3  29.6*  28.1*  25.5*   ISTATARTBE  5*  4*  1   ISTATTEMP  37.0 C  38.1 C  37.5 C   ISTATFIO2  40  40  45   ISTATSPEC  Arterial  Arterial  Arterial   ISTATAPHTC  7.445  7.462  7.403   [id number]  60*  64  80       HemoDynamics:  Pulse: (!) 102, Heart Rate (Monitored): 85  NIBP: 111/51 mmHg    Imaging: Available data reviewed     ECHO 5/27:  CONCLUSIONS  No prior study is available for comparison.   Normal left ventricular chamber size. Normal left ventricular wall thickness. Moderately reduced left ventricular systolic function. Left ventricular ejection fraction is visually estimated to be 35%. Normal   regional wall motion.   Grade II diastolic dysfunction.  Moderately dilated left atrium.  Moderate mitral regurgitation.  Estimated right ventricular systolic pressure is at least 45 mmHg.  IVC not visualized.      Recent Labs      05/27/17   0110   05/27/17   1230  05/27/17   1800  05/28/17   1100   TROPONINI  0.05*   < >  0.93*  1.67*  6.30*   BNPBTYPENAT  988*   --    --    --    --     < > = values in this interval not  displayed.       Neuro:  GCS Total Reed Coma Score: 10 11 with sedation vacation.   Imaging: Available data reviewed    Fluids:  Intake/Output       05/27/17 0700 - 05/28/17 0659 05/28/17 0700 - 05/29/17 0659 05/29/17 0700 - 05/30/17 0659      1900-5221 6184-7762 Total 4470-2815 4316-7712 Total 9921-8757 8878-4268 Total       Intake    I.V.  428.2  228.8 657  316.2  427.1 743.3  --  -- --    Propofol Volume 208.2 128.8 337 196.2 129.6 325.8 -- -- --    IV Volume 120 100 220 120 97.5 217.5 -- -- --     -- 100 -- 200 200 -- -- --    Other  --  -- --  60  -- 60  --  -- --    Medications (P.O./ Enteral Liquids) -- -- -- 60 -- 60 -- -- --    Dialysis  --  500 500  --  -- --  --  -- --    Dialysis Volume (Dialysis Intake) -- 500 500 -- -- -- -- -- --    Enteral  --  -- --  30  0 30  --  -- --    Enteral Volume -- -- -- -- 0 0 -- -- --    Free Water / Tube Flush -- -- -- 30 -- 30 -- -- --    Total Intake 428.2 728.8 1157 406.2 427.1 833.3 -- -- --       Output    Urine  325  -- 325  --  270 270  --  -- --    Indwelling Cathether 325 -- 325 -- 270 270 -- -- --    Dialysis  --  1500 1500  --  -- --  --  -- --    Dialysis Output (Dialysis Output) -- 1500 1500 -- -- -- -- -- --    Total Output 325 1500 1825 -- 270 270 -- -- --       Net I/O     103.2 -771.2 -668.1 406.2 157.1 563.3 -- -- --        Weight: 71.4 kg (157 lb 6.5 oz)  Recent Labs      05/27/17   0110  05/27/17   1230  05/28/17   0452  05/29/17   0440   SODIUM  138  138  138  138   POTASSIUM  4.2  4.3  4.0  4.1   CHLORIDE  99  102  101  103   CO2  28  28  28  26   BUN  22  30*  20  35*   CREATININE  4.50*  4.74*  3.57*  4.71*   MAGNESIUM  2.5   --   2.0  2.1   PHOSPHORUS   --    --   2.2*  4.0   CALCIUM  9.4  8.7  8.6  8.6       GI/Nutrition:  Imaging: Available data reviewed  NPO     Liver Function  Recent Labs      05/27/17   0110  05/27/17   1230  05/28/17   0452  05/29/17   0440   ALTSGPT  42   --    --   25   ASTSGOT  51*   --    --   21   ALKPHOSPHAT   77   --    --   51   TBILIRUBIN  0.6   --    --   0.9   LIPASE  36   --    --    --    PREALBUMIN   --    --    --   12.0*   GLUCOSE  139*  133*  99  104*       Heme:  Recent Labs      17   1140   RBC  3.86*  3.21*  3.13*   --    HEMOGLOBIN  10.7*  8.7*  8.5*   --    HEMATOCRIT  36.2*  28.0*  27.3*   --    PLATELETCT  92*  107*  95*   --    PROTHROMBTM  15.6*   --    --   16.4*   APTT  34.1   --    --   36.6*   INR  1.20*   --    --   1.28*       Infectious Disease:  Monitored Temp  Av.5 °C (99.5 °F)  Min: 37.2 °C (99 °F)  Max: 37.9 °C (100.2 °F)  Micro: reviewed     Recent Labs      17   WBC  10.5  9.5  10.7   NEUTSPOLYS  40.70*  80.80*  85.70*   LYMPHOCYTES  47.80*  12.90*  7.00*   MONOCYTES  6.20  4.50  4.80   EOSINOPHILS  0.90  1.00  1.50   BASOPHILS  0.00  0.40  0.50   ASTSGOT  51*   --   21   ALTSGPT  42   --   25   ALKPHOSPHAT  77   --   51   TBILIRUBIN  0.6   --   0.9     Current Facility-Administered Medications   Medication Dose Frequency Provider Last Rate Last Dose   • metronidazole (FLAGYL) tablet 500 mg  500 mg Q8HRS Carlos Manuel Man M.D.       • heparin 1000 units/mL injection 2,600 Units  2,600 Units PRN Ricardo Castaneda M.D.        And   • heparin infusion 25,000 units in 500 ml 0.45% nacl   Continuous Ricardo Castaneda M.D. 21 mL/hr at 17 1147 1,050 Units at 17 114   • acetaminophen (TYLENOL) suppository 650 mg  650 mg Q6HRS PRN Dragan Martinez M.D.   650 mg at 17 0444   • carvedilol (COREG) tablet 12.5 mg  12.5 mg BID WITH MEALS Carleen Stewart M.D.   12.5 mg at 17 0942   • cefTRIAXone (ROCEPHIN) 1 g in  mL IVPB  1 g Q24HRS Avinash Millan M.D.   Stopped at 17 1011   • Respiratory Care per Protocol   Continuous RT Dragan Martinez M.D.       • senna-docusate (PERICOLACE or SENOKOT S) 8.6-50 MG per tablet 2 Tab  2 Tab BID Dragan Viveros  WALESKA Harman   2 Tab at 05/29/17 0941    And   • polyethylene glycol/lytes (MIRALAX) PACKET 1 Packet  1 Packet QDAY PRN Dragan Martinez M.D.        And   • magnesium hydroxide (MILK OF MAGNESIA) suspension 30 mL  30 mL QDAY PRN Dragan Martinez M.D.        And   • bisacodyl (DULCOLAX) suppository 10 mg  10 mg QDAY PRN Dragan Martinez M.D.       • chlorhexidine (PERIDEX) 0.12 % solution 15 mL  15 mL BID Dragan Martinez M.D.   15 mL at 05/29/17 0941   • lidocaine (XYLOCAINE) 1%  injection  1-2 mL Q30 MIN PRN Dragan Martinez M.D.       • NS infusion   Continuous Dragan Martinez M.D. 10 mL/hr at 05/27/17 0354     • fentaNYL (SUBLIMAZE) injection 25 mcg  25 mcg Q HOUR PRN Dragan Martinez M.D.        Or   • fentaNYL (SUBLIMAZE) injection 50 mcg  50 mcg Q HOUR PRN Dragan Martinez M.D.   50 mcg at 05/28/17 1408    Or   • fentaNYL (SUBLIMAZE) injection 100 mcg  100 mcg Q HOUR PRN Draagn Martinez M.D.   100 mcg at 05/27/17 2101   • ipratropium-albuterol (DUONEB) nebulizer solution 3 mL  3 mL Q2HRS PRN (RT) Dragan Martinez M.D.       • famotidine (PEPCID) tablet 20 mg  20 mg DAILY Dragan Martinez M.D.   20 mg at 05/29/17 0942    Or   • famotidine (PEPCID) injection 20 mg  20 mg DAILY Dragan Martinez M.D.   20 mg at 05/28/17 0944   • propofol (DIPRIVAN) injection  5-80 mcg/kg/min Continuous Dragan Martinez M.D. 10.9 mL/hr at 05/29/17 1030 25 mcg/kg/min at 05/29/17 1030   • enalaprilat (VASOTEC) injection 1.25 mg  1.25 mg Q6HRS PRN GERARDO RichardsO.       • labetalol (NORMODYNE,TRANDATE) injection 10 mg  10 mg Q4HRS PRN GERARDO RichardsO.       • ondansetron (ZOFRAN) syringe/vial injection 4 mg  4 mg Q4HRS PRN ISABEL Richards.O.       • ondansetron (ZOFRAN ODT) dispertab 4 mg  4 mg Q4HRS PRN ISABEL Richards.O.       • acetaminophen (TYLENOL) tablet 650 mg  650 mg Q6HRS PRN ISABEL Richards.O.       • insulin lispro (HUMALOG) injection  1-6 Units  1-6 Units Q6HRS Dragan Sampson D.O.   Stopped at 05/27/17 0600   • glucose 4 g chewable tablet 16 g  16 g Q15 MIN PRN Dragan Sampson D.O.        And   • dextrose 50% (D50W) injection 25 mL  25 mL Q15 MIN PRN Dragan Sampson D.O.       • albumin human 25% solution 12.5 g  12.5 g Q HOUR PRN Yahaira Bear M.D.         This patient's medications have not been reviewed.    Quality  Measures:  Labs reviewed, Medications reviewed and Radiology images reviewed  Patricia catheter: Critically Ill - Requiring Accurate Measurement of Urinary Output and Unconscious / Sedated Patient on a Ventilator      DVT Prophylaxis: Heparin  DVT prophylaxis - mechanical: SCDs  Ulcer prophylaxis: Yes    Assessed for rehab: Patient unable to tolerate rehabilitation therapeutic regimen      Assessment and Plan:  Ventilator-dependent respiratory failure.   - Intubated AM 5/27   - cxr improved after bronch   - start SBTs in AM   - RT protocols  Abnormal CXR - RLL ATX/pna   - f/u BAL  Status post witnessed out-of-hospital asystolic cardiac arrest.    - Cardiology following- primary  Cardiac arrest/NSTEMI?/valvular heart Dz/myop   - Cath after off vent; d/w'd cardiology today   - correct lytes  Cardiomyopathy - EF 35%   Grade II DD  PHTN - mild-moderate RVSP 45   Secondary to MR? Moderate by ECHO  Query anoxic brain injury.  CT scan of the head shows no acute pathology.     With sedation vacation patient following well despite language barrier  Anemia with thrombocytopenia.   Serial lab - transfuse PRN  End-stage renal disease, on maintenance hemodialysis.   Renal consult   HD today?   Hold BP meds for more effective UF - Bp ok   Diabetes mellitus - glycemic control  Enteral nutrrition - Cortrak  ERP  Mobilize  Prophylaxis     Discussed patient condition and risk of morbidity and/or mortality with Family, RN, RT, Pharmacy, Charge nurse / hot rounds and cardiology and nephrology.          The patient remains critically ill.  Critical care time  = 32 minutes in directly providing and coordinating critical care and extensive data review.  No time overlap and excludes procedures.     I, William Viera and Martita Sexton (Scribe), am scribing for, and in the presence of, Carlos Manuel Man M.D.     Electronically signed by: William Sexton (Scribe), 5/29/2017    ICarlos Manuel M.D. personally performed the services described in this documentation, as scribed by Martita Sexton in my presence, and it is both accurate and complete.

## 2017-05-29 NOTE — PROGRESS NOTES
Discussed plan of care with daughter regarding SBT and how pt gets extubated per her questions.  Also reviewed labs and diagnostics with her and plans by cardiology to do further work up after extubation.  She expressed understanding.  Pt remains stable at this time on current regimen.

## 2017-05-29 NOTE — OP REPORT
DATE OF SERVICE:  05/28/2017    PROCEDURE PERFORMED:  Diagnostic and therapeutic flexible fiberoptic   bronchoscopy.    PREOPERATIVE DIAGNOSES:  Abnormal chest x-ray, mucus plugging, atelectasis,   possible aspiration pneumonia.    POSTOPERATIVE DIAGNOSES:  Aspiration pneumonia, mucus plugging, and   atelectasis, right greater than left.    INDICATIONS:  Patient is a 66-year-old woman who suffered a presumed cardiac   arrest yesterday morning.  Initially, no signs of aspiration pneumonia.  Clear   x-ray.  Overnight developed low-grade fever, but white count remained normal,   in fact went down a bit.  Chest x-ray, however, revealed bibasilar right   greater than left opacities and atelectasis.  Absent breath sounds at the   right base as well.  Increasing secretions over the course of the day.    Bronchoscopy indicated clear inspissated secretions and obstructive airways.    Evaluate for aspiration pneumonia and obtain cultures.  Both diagnostic and   therapeutic procedures performed.    DESCRIPTION OF PROCEDURE:  A time-out was observed.  Patient agrees to 100%   FIO2 for about 20 minutes before the procedure.  Procedure explained to   multiple family members including children and  at length.    Portex adaptor placed in line.  Diagnostic scope passed through the airway   into position.  There was a large creamy yellow plug saddle position over the   main jose luis.  About a third of a Lukens trap of thick purulent and   blood-tinged secretions were lavaged primarily from the right main bronchus   down to a lesser degree the left.  There were no endobronchial masses or   lesions.  The only anatomic variant was there was mild narrowing that appeared   to be a congenital variant if you will in the left lower lobe bronchus.    Could not pass the scope through a narrowed proximal area, but distally you   could see all the basilar segments of left lower lobe.  Fortunately, left side   really was not involved much.   Right side had diffuse erythema from the   bronchus intermedius down mild edema of all jose luis.  The right middle lobe to   a mild degree was plugged and had apparent secretions.  Most of the lower lobe   was completely obstructed with no airflow.  After repeated lavaging, I   cleared out the entire airway and got the Lukens trap was above.  I gave her a   break for a while let her ventilate and then wedged the scope in the right   lower lobe and two 30 mL aliquots of saline lavaged out sequentially and about   a 30% return of this BAL fluid was recollected in separate specimen cup.    Bronch wash for culture and right lower lobe BAL was sent down for routine   studies.  Patient tolerated the procedure well.  Heart rate did not vary   throughout the case, she was in AFib around 100.  Oxygen saturation 100%   throughout and never dropped down.  Blood pressure was unchanged as well and   she had a good mean arterial pressure throughout.  Follow up x-ray will be   obtained in a.m.  Post-procedure, she will receive breathing treatment, some   bagging suctioning, and some CPT in that right base.  She will be started on   antibiotics.  Antibiotic selection reviewed with the clinical pharmacist.       ____________________________________     MD SHAHID SAMSON / ARGELIA    DD:  05/28/2017 16:46:30  DT:  05/28/2017 17:10:41    D#:  [id number]  Job#:  [id number]

## 2017-05-29 NOTE — PROGRESS NOTES
Pt will go to CT today as ordered, dialysis RN aware and will do dialysis after procedure is completed later today.

## 2017-05-29 NOTE — PROGRESS NOTES
Nephrology Progress Note, Adult, Complex               Author: Ricardo Petersen   Date & Time created: 5/29/2017  12:49 PM   Interval History:  65 y/o female with ESRD/HD, s/p cardiac arrest  On HD MWF  Noted plans for CTA    Review of Systems:  Review of Systems   Unable to perform ROS: intubated       Physical Exam:  Physical Exam   Constitutional: She appears well-developed and well-nourished. No distress. She is intubated.   HENT:   Head: Normocephalic and atraumatic.   ETT   Eyes: Pupils are equal, round, and reactive to light.   Cardiovascular: Normal rate and regular rhythm.  Exam reveals no gallop and no friction rub.    Pulmonary/Chest: She is intubated. She has no wheezes. She has no rales.   vented   Abdominal: Soft. Bowel sounds are normal. She exhibits no distension.   Musculoskeletal: She exhibits no edema.   Neurological:   Opening eyes   Skin: Skin is warm. No erythema.   Nursing note and vitals reviewed.      Labs:  Recent Labs      05/27/17   1742  05/28/17   0455  05/29/17   0447   ISTATAPH  7.445  7.479  7.411   ISTATAPCO2  43.1*  37.9*  40.1*   ISTATAPO2  60*  60*  77   ISTATATCO2  31  29  27   [id number]  91*  92*  95   ISTATARTHCO3  29.6*  28.1*  25.5*   ISTATARTBE  5*  4*  1   ISTATTEMP  37.0 C  38.1 C  37.5 C   ISTATFIO2  40  40  45   ISTATSPEC  Arterial  Arterial  Arterial   ISTATAPHTC  7.445  7.462  7.403   [id number]  60*  64  80     Recent Labs      05/27/17   0110   05/27/17   1230  05/27/17   1800  05/28/17   1100   TROPONINI  0.05*   < >  0.93*  1.67*  6.30*   BNPBTYPENAT  988*   --    --    --    --     < > = values in this interval not displayed.     Recent Labs      05/27/17   0110  05/27/17   1230  05/28/17   0452  05/29/17   0440   SODIUM  138  138  138  138   POTASSIUM  4.2  4.3  4.0  4.1   CHLORIDE  99  102  101  103   CO2  28  28  28  26   BUN  22  30*  20  35*   CREATININE  4.50*  4.74*  3.57*  4.71*   MAGNESIUM  2.5   --   2.0  2.1   PHOSPHORUS   --    --   2.2*  4.0    CALCIUM  9.4  8.7  8.6  8.6     Recent Labs      17   0110  17   1230  17   04517   0440   ALTSGPT  42   --    --   25   ASTSGOT  51*   --    --   21   ALKPHOSPHAT  77   --    --   51   TBILIRUBIN  0.6   --    --   0.9   LIPASE  36   --    --    --    PREALBUMIN   --    --    --   12.0*   GLUCOSE  139*  133*  99  104*     Recent Labs      17   01117   04517   1140   RBC  3.86*  3.21*  3.13*   --    HEMOGLOBIN  10.7*  8.7*  8.5*   --    HEMATOCRIT  36.2*  28.0*  27.3*   --    PLATELETCT  92*  107*  95*   --    PROTHROMBTM  15.6*   --    --   16.4*   APTT  34.1   --    --   36.6*   INR  1.20*   --    --   1.28*     Recent Labs      17   WBC  10.5  9.5  10.7   NEUTSPOLYS  40.70*  80.80*  85.70*   LYMPHOCYTES  47.80*  12.90*  7.00*   MONOCYTES  6.20  4.50  4.80   EOSINOPHILS  0.90  1.00  1.50   BASOPHILS  0.00  0.40  0.50   ASTSGOT  51*   --   21   ALTSGPT  42   --   25   ALKPHOSPHAT  77   --   51   TBILIRUBIN  0.6   --   0.9           Hemodynamics:  No data recorded.  Monitored Temp: 37.3 °C (99.1 °F)  Pulse  Av  Min: 48  Max: 102Heart Rate (Monitored): 85  NIBP: 111/51 mmHg     Respiratory:  Ordaz Vent Mode: APVCMV, Rate (breaths/min): 16, PEEP/CPAP: 8, FiO2: 45, P Peak (PIP): 18, P MEAN: 11 Respiration: 14, Pulse Oximetry: 98 %     Work Of Breathing / Effort: Vented  RUL Breath Sounds: Clear, RML Breath Sounds: Diminished, RLL Breath Sounds: Diminished, DEV Breath Sounds: Clear, LLL Breath Sounds: Diminished  Fluids:    Intake/Output Summary (Last 24 hours) at 17 1249  Last data filed at 17 0600   Gross per 24 hour   Intake 563.79 ml   Output    270 ml   Net 293.79 ml     Weight: 71.4 kg (157 lb 6.5 oz)  GI/Nutrition:  Orders Placed This Encounter   Procedures   • Diet NPO     Standing Status: Standing      Number of Occurrences: 1      Standing Expiration Date:      Order Specific  Question:  Restrict to:     Answer:  With Tube Feed [4]     Medical Decision Making, by Problem:  Active Hospital Problems    Diagnosis   • Cardiac arrest (CMS-[facility]) [I46.9]   • Atrial fibrillation (CMS-[facility]) [I48.91]   • ESRD (end stage renal disease) (CMS-[facility]) [N18.6]   • Acute respiratory failure with hypoxia (CMS-[facility]) [J96.01]   • HTN (hypertension) [I10]   • IDDM (insulin dependent diabetes mellitus) (CMS-[facility]) [E11.9, Z79.4]   • Dyslipidemia [E78.5]   • Cardiomyopathy (CMS-[facility]) [I42.9]   • Anemia [D64.9]   • Elevated troponin [R74.8]       Medications reviewed and Labs reviewed                      Assessment and Plan  1.ESRD/HD -MWF, HD after CTA  2.HTN: BP well controlled  3.Electrolytes: well controlled.  4.Anemia: droon Epogen  5.S/p cardiac arrest  6.Volume:well controlled with UF/HD    Recs; HD MWF             HD after CT

## 2017-05-29 NOTE — PROGRESS NOTES
Discussed plan for CTA with family and need for consent, reason for test and initiation of heparin drip.  They expressed understanding and agree to IV contrast.  Plan per CT is to perform test tomorrow with dialysis on Wednesday.

## 2017-05-30 ENCOUNTER — APPOINTMENT (OUTPATIENT)
Dept: RADIOLOGY | Facility: MEDICAL CENTER | Age: 67
DRG: 264 | End: 2017-05-30
Attending: INTERNAL MEDICINE
Payer: MEDICARE

## 2017-05-30 LAB
ANION GAP SERPL CALC-SCNC: 10 MMOL/L (ref 0–11.9)
APTT PPP: 113.9 SEC (ref 24.7–36)
APTT PPP: 212.9 SEC (ref 24.7–36)
APTT PPP: 81.1 SEC (ref 24.7–36)
APTT PPP: 88.2 SEC (ref 24.7–36)
BACTERIA SPEC RESP CULT: ABNORMAL
BASE EXCESS BLDA CALC-SCNC: 7 MMOL/L (ref -4–3)
BASOPHILS # BLD AUTO: 0.3 % (ref 0–1.8)
BASOPHILS # BLD: 0.03 K/UL (ref 0–0.12)
BODY TEMPERATURE: ABNORMAL DEGREES
BUN SERPL-MCNC: 24 MG/DL (ref 8–22)
CALCIUM SERPL-MCNC: 8.4 MG/DL (ref 8.5–10.5)
CHLORIDE SERPL-SCNC: 98 MMOL/L (ref 96–112)
CO2 BLDA-SCNC: 33 MMOL/L (ref 20–33)
CO2 SERPL-SCNC: 29 MMOL/L (ref 20–33)
CREAT SERPL-MCNC: 3.15 MG/DL (ref 0.5–1.4)
EOSINOPHIL # BLD AUTO: 0.12 K/UL (ref 0–0.51)
EOSINOPHIL NFR BLD: 1.2 % (ref 0–6.9)
ERYTHROCYTE [DISTWIDTH] IN BLOOD BY AUTOMATED COUNT: 58 FL (ref 35.9–50)
GFR SERPL CREATININE-BSD FRML MDRD: 15 ML/MIN/1.73 M 2
GLUCOSE BLD-MCNC: 125 MG/DL (ref 65–99)
GLUCOSE BLD-MCNC: 131 MG/DL (ref 65–99)
GLUCOSE BLD-MCNC: 138 MG/DL (ref 65–99)
GLUCOSE BLD-MCNC: 164 MG/DL (ref 65–99)
GLUCOSE SERPL-MCNC: 140 MG/DL (ref 65–99)
GRAM STN SPEC: ABNORMAL
GRAM STN SPEC: ABNORMAL
HCO3 BLDA-SCNC: 31.3 MMOL/L (ref 17–25)
HCT VFR BLD AUTO: 25.8 % (ref 37–47)
HGB BLD-MCNC: 8.2 G/DL (ref 12–16)
IMM GRANULOCYTES # BLD AUTO: 0.07 K/UL (ref 0–0.11)
IMM GRANULOCYTES NFR BLD AUTO: 0.7 % (ref 0–0.9)
LYMPHOCYTES # BLD AUTO: 0.97 K/UL (ref 1–4.8)
LYMPHOCYTES NFR BLD: 9.5 % (ref 22–41)
MAGNESIUM SERPL-MCNC: 1.9 MG/DL (ref 1.5–2.5)
MCH RBC QN AUTO: 27.3 PG (ref 27–33)
MCHC RBC AUTO-ENTMCNC: 31.8 G/DL (ref 33.6–35)
MCV RBC AUTO: 86 FL (ref 81.4–97.8)
MONOCYTES # BLD AUTO: 0.44 K/UL (ref 0–0.85)
MONOCYTES NFR BLD AUTO: 4.3 % (ref 0–13.4)
NEUTROPHILS # BLD AUTO: 8.56 K/UL (ref 2–7.15)
NEUTROPHILS NFR BLD: 84 % (ref 44–72)
NRBC # BLD AUTO: 0 K/UL
NRBC BLD AUTO-RTO: 0 /100 WBC
O2/TOTAL GAS SETTING VFR VENT: 45 %
PCO2 BLDA: 41.2 MMHG (ref 26–37)
PCO2 TEMP ADJ BLDA: 42.7 MMHG (ref 26–37)
PH BLDA: 7.49 [PH] (ref 7.4–7.5)
PH TEMP ADJ BLDA: 7.48 [PH] (ref 7.4–7.5)
PHOSPHATE SERPL-MCNC: 3 MG/DL (ref 2.5–4.5)
PLATELET # BLD AUTO: 107 K/UL (ref 164–446)
PMV BLD AUTO: 10.7 FL (ref 9–12.9)
PO2 BLDA: 58 MMHG (ref 64–87)
PO2 TEMP ADJ BLDA: 62 MMHG (ref 64–87)
POTASSIUM SERPL-SCNC: 3.6 MMOL/L (ref 3.6–5.5)
RBC # BLD AUTO: 3 M/UL (ref 4.2–5.4)
SAO2 % BLDA: 92 % (ref 93–99)
SIGNIFICANT IND 70042: ABNORMAL
SIGNIFICANT IND 70042: ABNORMAL
SITE SITE: ABNORMAL
SITE SITE: ABNORMAL
SODIUM SERPL-SCNC: 137 MMOL/L (ref 135–145)
SOURCE SOURCE: ABNORMAL
SOURCE SOURCE: ABNORMAL
SPECIMEN DRAWN FROM PATIENT: ABNORMAL
TSH SERPL DL<=0.005 MIU/L-ACNC: 0.53 UIU/ML (ref 0.3–3.7)
WBC # BLD AUTO: 10.2 K/UL (ref 4.8–10.8)

## 2017-05-30 PROCEDURE — 700102 HCHG RX REV CODE 250 W/ 637 OVERRIDE(OP): Performed by: HOSPITALIST

## 2017-05-30 PROCEDURE — 90935 HEMODIALYSIS ONE EVALUATION: CPT

## 2017-05-30 PROCEDURE — A9270 NON-COVERED ITEM OR SERVICE: HCPCS | Performed by: INTERNAL MEDICINE

## 2017-05-30 PROCEDURE — 83735 ASSAY OF MAGNESIUM: CPT

## 2017-05-30 PROCEDURE — 700102 HCHG RX REV CODE 250 W/ 637 OVERRIDE(OP): Performed by: INTERNAL MEDICINE

## 2017-05-30 PROCEDURE — 700111 HCHG RX REV CODE 636 W/ 250 OVERRIDE (IP): Performed by: INTERNAL MEDICINE

## 2017-05-30 PROCEDURE — 84100 ASSAY OF PHOSPHORUS: CPT

## 2017-05-30 PROCEDURE — 770022 HCHG ROOM/CARE - ICU (200)

## 2017-05-30 PROCEDURE — 85025 COMPLETE CBC W/AUTO DIFF WBC: CPT

## 2017-05-30 PROCEDURE — 85730 THROMBOPLASTIN TIME PARTIAL: CPT

## 2017-05-30 PROCEDURE — 82962 GLUCOSE BLOOD TEST: CPT

## 2017-05-30 PROCEDURE — 700105 HCHG RX REV CODE 258: Performed by: INTERNAL MEDICINE

## 2017-05-30 PROCEDURE — 99291 CRITICAL CARE FIRST HOUR: CPT | Performed by: INTERNAL MEDICINE

## 2017-05-30 PROCEDURE — 82803 BLOOD GASES ANY COMBINATION: CPT

## 2017-05-30 PROCEDURE — 84443 ASSAY THYROID STIM HORMONE: CPT

## 2017-05-30 PROCEDURE — P9047 ALBUMIN (HUMAN), 25%, 50ML: HCPCS | Performed by: INTERNAL MEDICINE

## 2017-05-30 PROCEDURE — 94003 VENT MGMT INPAT SUBQ DAY: CPT

## 2017-05-30 PROCEDURE — 80048 BASIC METABOLIC PNL TOTAL CA: CPT

## 2017-05-30 PROCEDURE — 700111 HCHG RX REV CODE 636 W/ 250 OVERRIDE (IP): Performed by: HOSPITALIST

## 2017-05-30 PROCEDURE — 71010 DX-CHEST-PORTABLE (1 VIEW): CPT

## 2017-05-30 PROCEDURE — 99233 SBSQ HOSP IP/OBS HIGH 50: CPT | Performed by: HOSPITALIST

## 2017-05-30 PROCEDURE — 36600 WITHDRAWAL OF ARTERIAL BLOOD: CPT

## 2017-05-30 RX ORDER — SODIUM CHLORIDE 9 MG/ML
250 INJECTION, SOLUTION INTRAVENOUS
Status: DISCONTINUED | OUTPATIENT
Start: 2017-05-30 | End: 2017-06-07 | Stop reason: HOSPADM

## 2017-05-30 RX ORDER — LINEZOLID 600 MG/1
600 TABLET, FILM COATED ORAL EVERY 12 HOURS
Status: COMPLETED | OUTPATIENT
Start: 2017-05-30 | End: 2017-06-05

## 2017-05-30 RX ORDER — ALBUMIN (HUMAN) 12.5 G/50ML
12.5 SOLUTION INTRAVENOUS
Status: COMPLETED | OUTPATIENT
Start: 2017-05-30 | End: 2017-05-30

## 2017-05-30 RX ORDER — LINEZOLID 2 MG/ML
600 INJECTION, SOLUTION INTRAVENOUS ONCE
Status: COMPLETED | OUTPATIENT
Start: 2017-05-30 | End: 2017-05-30

## 2017-05-30 RX ADMIN — PROPOFOL 25 MCG/KG/MIN: 10 INJECTION, EMULSION INTRAVENOUS at 14:13

## 2017-05-30 RX ADMIN — PROPOFOL 25 MCG/KG/MIN: 10 INJECTION, EMULSION INTRAVENOUS at 04:14

## 2017-05-30 RX ADMIN — ERYTHROPOIETIN 10000 UNITS: 10000 INJECTION, SOLUTION INTRAVENOUS; SUBCUTANEOUS at 20:16

## 2017-05-30 RX ADMIN — STANDARDIZED SENNA CONCENTRATE AND DOCUSATE SODIUM 2 TABLET: 8.6; 5 TABLET, FILM COATED ORAL at 21:22

## 2017-05-30 RX ADMIN — LINEZOLID 600 MG: 600 TABLET, FILM COATED ORAL at 21:21

## 2017-05-30 RX ADMIN — CHLORHEXIDINE GLUCONATE 15 ML: 1.2 RINSE ORAL at 21:21

## 2017-05-30 RX ADMIN — STANDARDIZED SENNA CONCENTRATE AND DOCUSATE SODIUM 2 TABLET: 8.6; 5 TABLET, FILM COATED ORAL at 07:57

## 2017-05-30 RX ADMIN — LINEZOLID 600 MG: 600 INJECTION, SOLUTION INTRAVENOUS at 12:19

## 2017-05-30 RX ADMIN — HEPARIN SODIUM 750 UNITS: 5000 INJECTION, SOLUTION INTRAVENOUS at 17:16

## 2017-05-30 RX ADMIN — CHLORHEXIDINE GLUCONATE 15 ML: 1.2 RINSE ORAL at 07:55

## 2017-05-30 RX ADMIN — POLYETHYLENE GLYCOL 3350 1 PACKET: 17 POWDER, FOR SOLUTION ORAL at 21:21

## 2017-05-30 RX ADMIN — Medication 12.5 G: at 20:17

## 2017-05-30 RX ADMIN — INSULIN LISPRO 1 UNITS: 100 INJECTION, SOLUTION INTRAVENOUS; SUBCUTANEOUS at 23:19

## 2017-05-30 RX ADMIN — ATORVASTATIN CALCIUM 40 MG: 40 TABLET, FILM COATED ORAL at 21:21

## 2017-05-30 RX ADMIN — Medication 12.5 G: at 18:42

## 2017-05-30 RX ADMIN — METRONIDAZOLE 500 MG: 500 TABLET ORAL at 14:09

## 2017-05-30 RX ADMIN — FENTANYL CITRATE 25 MCG: 50 INJECTION, SOLUTION INTRAMUSCULAR; INTRAVENOUS at 21:44

## 2017-05-30 RX ADMIN — AMIODARONE HYDROCHLORIDE 400 MG: 200 TABLET ORAL at 07:56

## 2017-05-30 RX ADMIN — MAGNESIUM SULFATE HEPTAHYDRATE 1 G: 1 INJECTION, SOLUTION INTRAVENOUS at 12:14

## 2017-05-30 RX ADMIN — PROPOFOL 25 MCG/KG/MIN: 10 INJECTION, EMULSION INTRAVENOUS at 22:42

## 2017-05-30 RX ADMIN — FAMOTIDINE 20 MG: 20 TABLET, FILM COATED ORAL at 07:56

## 2017-05-30 RX ADMIN — METRONIDAZOLE 500 MG: 500 TABLET ORAL at 21:22

## 2017-05-30 RX ADMIN — Medication 12.5 G: at 18:58

## 2017-05-30 RX ADMIN — CARVEDILOL 12.5 MG: 12.5 TABLET, FILM COATED ORAL at 06:06

## 2017-05-30 RX ADMIN — SODIUM CHLORIDE: 9 INJECTION, SOLUTION INTRAVENOUS at 19:45

## 2017-05-30 RX ADMIN — CEFTRIAXONE SODIUM 1 G: 1 INJECTION, POWDER, FOR SOLUTION INTRAMUSCULAR; INTRAVENOUS at 09:48

## 2017-05-30 RX ADMIN — AMIODARONE HYDROCHLORIDE 400 MG: 200 TABLET ORAL at 21:21

## 2017-05-30 RX ADMIN — METRONIDAZOLE 500 MG: 500 TABLET ORAL at 04:14

## 2017-05-30 ASSESSMENT — LIFESTYLE VARIABLES: REASON UNABLE TO ASSESS: INTUBATED

## 2017-05-30 NOTE — CARE PLAN
Problem: Infection  Goal: Will remain free from infection  Outcome: PROGRESSING AS EXPECTED  Assess pt for any s/s of infection. Educate pt on s/s of infection and hand hygiene. Foam in and out of pt room. Assess for need of any lines and remove unnecessary lines.     Problem: Respiratory:  Goal: Respiratory status will improve  Outcome: PROGRESSING AS EXPECTED  VAP in place, q 2 hour turns mobilize EOB

## 2017-05-30 NOTE — CARE PLAN
Adult Ventilation Update    Total Vent Days: 4    Patient Lines/Drains/Airways Status    Active Airway     Name: Placement date: Placement time: Site: Days:    Airway Group ET Tube Oral 7.5 05/27/17    Oral  3               In the last 24 hours, the patient tolerated SBT for 25 minutes on settings of 5 over 8          Rapid Shallow Breathing Index (RR/VT): 114 (05/29/17 0759)  Plateau Pressure (Q Shift): 13 (05/29/17 1845)  Static Compliance (ml / cm H2O): 27.6 (05/30/17 0231)    Patient failed trials because of Barriers to Wean: Other (Comments) (pt going to CT) (05/29/17 1507)  Barriers to SBT Weaning Trial Stopped due to:: RSBI >105 (Rapid Shallow Breathing Index) (05/29/17 0759)  Length of Weaning Trial Length of Weaning Trial (Hours):  (25 min) (05/29/17 0759)        Sputum/Suction   Cough: Productive (05/30/17 0400)  Sputum Amount: Moderate (05/30/17 0400)  Sputum Color: Clear;White (05/30/17 0400)  Sputum Consistency: Thick (05/30/17 0400)    Mobility Group  Activity Performed: Unable to mobilize (05/29/17 2000)  Pt Calls for Assistance: No (05/30/17 0400)  Reason Not Mobilized:  (pt recieving dialysis ) (05/29/17 2000)  Mobilization Comments: Per Individualized order by STEVE Millan MD that was verified by SEVERINO Chinchilla RN (05/27/17 2200)    Events/Summary/Plan: vent check, no SBT this shift

## 2017-05-30 NOTE — ASSESSMENT & PLAN NOTE
Cx MRSA  Completed 7 days of zyvox   Also Completed 5 day course ceft flagyl for aspiration Pna

## 2017-05-30 NOTE — PROGRESS NOTES
HEMODIALYSIS NOTES:     HD today x 3 hours per Dr. Petersen. Initiated at 1727 and ended at 2027. Patient tolerated treatment. Please see paper flowsheet for details.    UF Net: 1,000 mL    Blood returned. Applied gauze and held R AVF site for 15 minutes. Verified no bleeding. Bruit and thrill present post dialysis. Instructions given to Primary RN that if bleeding occurs on the AVF site, change dressing and held the site with pressure. Report given to BLAYNE Turcios RN.

## 2017-05-30 NOTE — CARE PLAN
Problem: Venous Thromboembolism (VTW)/Deep Vein Thrombosis (DVT) Prevention:  Goal: Patient will participate in Venous Thrombosis (VTE)/Deep Vein Thrombosis (DVT)Prevention Measures  Intervention: Ensure patient wears graduated elastic stockings (STANISLAW hose) and/or SCDs, if ordered, when in bed or chair (Remove at least once per shift for skin check)  SCD's properly placed. Skin beneath assessed for pressure ulcers.       Problem: Pain Management  Goal: Pain level will decrease to patient’s comfort goal  Intervention: Follow pain managment plan developed in collaboration with patient and Interdisciplinary Team  Pt medicated per MAR for pain

## 2017-05-30 NOTE — PROGRESS NOTES
Pulmonary Critical Care Progress Note      Date of service: 5/30/2017    Chief Complaint: Cardiac Arrest    History of Present Illness: The entire history is obtained from healthcare providers, the medical record and the family at the bedside, as this lady cannot give me any history.  I was kindly asked by Dr. Holcomb to see and evaluate this lady regarding the above problems.  This is a 66-year-old lady who resides in [place], [place].  She has end-stage renal disease.  She is on thrice weekly hemodialysis.  Her last dialysis was Friday morning in [place].  She is a diabetic.  According to her son, the family traveled to [place] today for the weekend.  They were checking in to the [facility].  Apparently, she suddenly became unresponsive while sitting in a chair, awaiting hotel registration.  CPR was initiated in the field by bystanders.  An AED was placed on her, but it did not advise defibrillation.  EMS was activated.  She was brought [facility] to [facility].  She was in asystole.  She had return of spontaneous circulation with intravenous epinephrine and CPR.  She is now intubated on the ventilator.      ROS:    Respiratory: unable to perform due to the patient's inability to effectively communicate,   Cardiac: unable to perform due to the patient's inability to effectively communicate,   GI: unable to perform due to the patient's inability to effectively communicate.      Interval Events:  24 hour interval history reviewed     Follows commands, moves all extremities. Propofol 25   Will mobilize today.   Fentanyl pushes PRN for pain relief.   SB with proximal Afib.   Received dialysis yesterday.   Vent day 4. SBT 25 minutes on 8/8 - [id number]  CXR with increasing right pleural effusion and bilateral lower lobe infiltrates.   Aspirin held due to platelets 93 by nursing staff.     PFSH:  No change.    Physical Exam  General: Sedate on propofol 25, Intermittently follows for  family  Neuro/Psych: Pupils 2 on the left and 3 on the right, sluggish  HEENT: Right nare coretrak, Supple, midline, no crepitus. Right IJ CVC CDI  CVS: Irregular, no murmur  Respiratory: ET tube in place, Clear anteriorly, no wheezing  Abdomen/: Soft, NT, bowel sounds present  Extremities: Distal pulses 2+ bilateral. Trace edema.  Skin: Cool, dry, perfuse.     Respiratory:  Ordaz Vent Mode: APVCMV, Rate (breaths/min): 16, Vt Target (mL): 320, PEEP/CPAP: 8, FiO2: 45, Static Compliance (ml / cm H2O): 27.6, Control VTE (exp VT): 324  Pulse Oximetry: 96 %  ImagingAvailable data reviewed   Recent Labs      05/28/17   0455  05/29/17   0447  05/30/17   0509   ISTATAPH  7.479  7.411  7.489   ISTATAPCO2  37.9*  40.1*  41.2*   ISTATAPO2  60*  77  58*   ISTATATCO2  29  27  33   [id number]  92*  95  92*   ISTATARTHCO3  28.1*  25.5*  31.3*   ISTATARTBE  4*  1  7*   ISTATTEMP  38.1 C  37.5 C  37.8 C   ISTATFIO2  40  45  45   ISTATSPEC  Arterial  Arterial  Arterial   ISTATAPHTC  7.462  7.403  7.477   [id number]  64  80  62*     HemoDynamics:  Pulse: 65, Heart Rate (Monitored): 66  NIBP: 132/47 mmHg    Imaging: Available data reviewed     ECHO 5/27:  CONCLUSIONS  No prior study is available for comparison.   Normal left ventricular chamber size. Normal left ventricular wall thickness. Moderately reduced left ventricular systolic function. Left ventricular ejection fraction is visually estimated to be 35%. Normal   regional wall motion.   Grade II diastolic dysfunction.  Moderately dilated left atrium.  Moderate mitral regurgitation.  Estimated right ventricular systolic pressure is at least 45 mmHg.  IVC not visualized.    Recent Labs      05/27/17   1230  05/27/17   1800  05/28/17   1100   TROPONINI  0.93*  1.67*  6.30*     Neuro:  GCS Total Reed Coma Score: 11  Imaging: Available data reviewed    Fluids:  Intake/Output       05/28/17 0700 - 05/29/17 0659 05/29/17 0700 - 05/30/17 0659 05/30/17 0700 - 05/31/17 0659       0700-1859 1900-0659 Total 0700-1859 1900-0659 Total 0700-1859 1900-0659 Total       Intake    I.V.  316.2  427.1 743.3  273.4  345.7 619.1  --  -- --    Propofol Volume 196.2 129.6 325.8 130.8 130.8 261.6 -- -- --    Heparin Volume -- -- -- 142.6 214.9 357.5 -- -- --    IV Volume 120 97.5 217.5 -- -- -- -- -- --    ABX -- 200 200 -- -- -- -- -- --    Other  60  -- 60  240  30 270  --  -- --    Medications (P.O./ Enteral Liquids) 60 -- 60 240 30 270 -- -- --    Dialysis  --  -- --  --  600 600  --  -- --    Dialysis Volume (Dialysis Intake) -- -- -- -- 600 600 -- -- --    Enteral  30  0 30  275  480 755  --  -- --    Enteral Volume -- 0 0 275 390 665 -- -- --    Free Water / Tube Flush 30 -- 30 -- 90 90 -- -- --    Total Intake 406.2 427.1 833.3 788.4 1455.7 2244.1 -- -- --       Output    Urine  --  270 270  275  200 475  --  -- --    Indwelling Cathether -- 270 270 275 200 475 -- -- --    Dialysis  --  -- --  --  1600 1600  --  -- --    Dialysis Output (Dialysis Output) -- -- -- -- 1600 1600 -- -- --    Stool  --  -- --  --  -- --  --  -- --    Number of Times Stooled -- -- -- 0 x -- 0 x -- -- --    Total Output -- 270  2075 -- -- --       Net I/O     406.2 157.1 563.3 513.4 -344.3 169.1 -- -- --        Weight: 70.6 kg (155 lb 10.3 oz)  Recent Labs      05/28/17 0452 05/29/17 0440 05/30/17 0410   SODIUM  138  138  137   POTASSIUM  4.0  4.1  3.6   CHLORIDE  101  103  98   CO2  28  26  29   BUN  20  35*  24*   CREATININE  3.57*  4.71*  3.15*   MAGNESIUM  2.0  2.1  1.9   PHOSPHORUS  2.2*  4.0  3.0   CALCIUM  8.6  8.6  8.4*     GI/Nutrition:  Imaging: Available data reviewed  NPO and tube feed Tolerated at 40.     Liver Function:  Recent Labs      05/28/17   0452  05/29/17 0440 05/30/17 0410   ALTSGPT   --   25   --    ASTSGOT   --   21   --    ALKPHOSPHAT   --   51   --    TBILIRUBIN   --   0.9   --    PREALBUMIN   --   12.0*   --    GLUCOSE  99  104*  140*     Heme:  Recent Labs       17   0452  17   0440  17   1140  17   1715  17   0001  17   0410   RBC  3.21*  3.13*   --    --    --   3.00*   HEMOGLOBIN  8.7*  8.5*   --    --    --   8.2*   HEMATOCRIT  28.0*  27.3*   --    --    --   25.8*   PLATELETCT  107*  95*   --    --    --   107*   PROTHROMBTM   --    --   16.4*   --    --    --    APTT   --    --   36.6*  231.2*  212.9*   --    INR   --    --   1.28*   --    --    --      Infectious Disease:  Temp  Av.6 °C (99.7 °F)  Min: 37.4 °C (99.3 °F)  Max: 37.7 °C (99.9 °F)  Monitored Temp  Av.6 °C (99.6 °F)  Min: 37.3 °C (99.1 °F)  Max: 37.9 °C (100.2 °F)  Micro: reviewed   Ceftriaxone and Flagyl.   ? Staph aureus pending.   Recent Labs      17   0452  17   0440  17   0410   WBC  9.5  10.7  10.2   NEUTSPOLYS  80.80*  85.70*  84.00*   LYMPHOCYTES  12.90*  7.00*  9.50*   MONOCYTES  4.50  4.80  4.30   EOSINOPHILS  1.00  1.50  1.20   BASOPHILS  0.40  0.50  0.30   ASTSGOT   --   21   --    ALTSGPT   --   25   --    ALKPHOSPHAT   --   51   --    TBILIRUBIN   --   0.9   --      Current Facility-Administered Medications   Medication Dose Frequency Provider Last Rate Last Dose   • metronidazole (FLAGYL) tablet 500 mg  500 mg Q8HRS Carlos Manuel Man M.D.   500 mg at 17 0414   • heparin 1000 units/mL injection 2,600 Units  2,600 Units PRN Ricardo Castaneda M.D.        And   • heparin infusion 25,000 units in 500 ml 0.45% nacl   Continuous Ricardo Castaneda M.D. 17 mL/hr at 17 0127 850 Units/hr at 17 0127   • aspirin EC (ECOTRIN) tablet 81 mg  81 mg DAILY Mulu Bryant M.D.       • atorvastatin (LIPITOR) tablet 40 mg  40 mg QHS Mulu Bryant M.D.   40 mg at 174   • amiodarone (CORDARONE) tablet 400 mg  400 mg TWICE DAILY Mulu Bryant M.D.   400 mg at 174   • [START ON 2017] amiodarone (CORDARONE) tablet 200 mg  200 mg TWICE DAILY Mulu Bryant M.D.       • [START ON 2017] amiodarone (CORDARONE) tablet 200 mg   200 mg Q DAY Mulu Bryant M.D.       • acetaminophen (TYLENOL) suppository 650 mg  650 mg Q6HRS PRN Dragan Martinez M.D.   650 mg at 05/28/17 0444   • carvedilol (COREG) tablet 12.5 mg  12.5 mg BID WITH MEALS Carleen Stewart M.D.   12.5 mg at 05/30/17 0606   • cefTRIAXone (ROCEPHIN) 1 g in  mL IVPB  1 g Q24HRS Avinash Millan M.D.   Stopped at 05/29/17 1011   • Respiratory Care per Protocol   Continuous RT Dragan Martinez M.D.       • senna-docusate (PERICOLACE or SENOKOT S) 8.6-50 MG per tablet 2 Tab  2 Tab BID Dragan Martinez M.D.   2 Tab at 05/29/17 1947    And   • polyethylene glycol/lytes (MIRALAX) PACKET 1 Packet  1 Packet QDAY PRN Dragan Martinez M.D.        And   • magnesium hydroxide (MILK OF MAGNESIA) suspension 30 mL  30 mL QDAY PRN Dragan Martinez M.D.        And   • bisacodyl (DULCOLAX) suppository 10 mg  10 mg QDAY PRN Dragan Martinez M.D.       • chlorhexidine (PERIDEX) 0.12 % solution 15 mL  15 mL BID Dragan Martinez M.D.   15 mL at 05/29/17 1947   • lidocaine (XYLOCAINE) 1%  injection  1-2 mL Q30 MIN PRN Dragan Martinez M.D.       • NS infusion   Continuous Dragan Martinez M.D. 10 mL/hr at 05/27/17 0354     • fentaNYL (SUBLIMAZE) injection 25 mcg  25 mcg Q HOUR PRN Dragan Martinez M.D.        Or   • fentaNYL (SUBLIMAZE) injection 50 mcg  50 mcg Q HOUR PRN Dragan Martinez M.D.   50 mcg at 05/29/17 2244    Or   • fentaNYL (SUBLIMAZE) injection 100 mcg  100 mcg Q HOUR PRN Dragan Martinez M.D.   100 mcg at 05/27/17 2101   • ipratropium-albuterol (DUONEB) nebulizer solution 3 mL  3 mL Q2HRS PRN (RT) Dragan Martinez M.D.       • famotidine (PEPCID) tablet 20 mg  20 mg DAILY Dragan Martinez M.D.   20 mg at 05/29/17 0942   • propofol (DIPRIVAN) injection  5-80 mcg/kg/min Continuous Dragan Martinez M.D. 10.9 mL/hr at 05/30/17 0414 25 mcg/kg/min at 05/30/17 0414   • enalaprilat (VASOTEC)  injection 1.25 mg  1.25 mg Q6HRS PRN GERARDO RichardsO.       • labetalol (NORMODYNE,TRANDATE) injection 10 mg  10 mg Q4HRS PRN Dragan Sampson D.O.       • ondansetron (ZOFRAN) syringe/vial injection 4 mg  4 mg Q4HRS PRN GERARDO RichardsO.       • ondansetron (ZOFRAN ODT) dispertab 4 mg  4 mg Q4HRS PRN GERARDO RichardsO.       • acetaminophen (TYLENOL) tablet 650 mg  650 mg Q6HRS PRN GERARDO RichardsO.       • insulin lispro (HUMALOG) injection 1-6 Units  1-6 Units Q6HRS GERARDO RichardsO.   Stopped at 05/27/17 0600   • glucose 4 g chewable tablet 16 g  16 g Q15 MIN PRN Dragan Sampson D.O.        And   • dextrose 50% (D50W) injection 25 mL  25 mL Q15 MIN PRN Dragan Sampson D.O.       • albumin human 25% solution 12.5 g  12.5 g Q HOUR PRN Yahaira Bear M.D.         This patient's medications have not been reviewed.    Quality  Measures:  Labs reviewed, Medications reviewed and Radiology images reviewed  Patricia catheter: Critically Ill - Requiring Accurate Measurement of Urinary Output and Unconscious / Sedated Patient on a Ventilator      DVT Prophylaxis: Heparin  DVT prophylaxis - mechanical: SCDs  Ulcer prophylaxis: Yes    Assessed for rehab: Patient unable to tolerate rehabilitation therapeutic regimen    Assessment and Plan:  Ventilator-dependent respiratory failure.   - Intubated AM 5/27   - cxr improved after bronch   - SBTs not ready for ext   - RT protocols  Abnormal CXR - RLL ATX/pna   - f/u BAL  Status post witnessed out-of-hospital asystolic cardiac arrest.    - Cardiology following- primary  Cardiac arrest/NSTEMI?/valvular heart Dz/myop   - Cath after off vent; d/w'd cardiology today   - correct lytes  Cardiomyopathy - EF 35%   Grade II DD  PHTN - mild-moderate RVSP 45   Secondary to MR? Moderate by ECHO  Query anoxic brain injury.  CT scan of the head shows no acute pathology.     With sedation vacation patient following well despite language barrier  Anemia with thrombocytopenia.   Serial lab -  transfuse PRN  End-stage renal disease, on maintenance hemodialysis.   Renal consult   HD today?   Hold BP meds for more effective UF - Bp ok   Diabetes mellitus - glycemic control  Enteral nutrrition - Cortrak  ERP  Mobilize  Prophylaxis     Resume SBT and mobilizing.     Discussed patient condition and risk of morbidity and/or mortality with Family, RN, RT, Pharmacy, Charge nurse / hot rounds and cardiology and nephrology.      The patient remains critically ill.  Critical care time = 35 minutes in directly providing and coordinating critical care and extensive data review.  No time overlap and excludes procedures.    Frantz VALDEZ (Scribe), am scribing for, and in the presence of, Carlos Manuel Man M.D.     Electronically signed by: Frantz Miller (Tad), 5/30/2017    Carlos Manuel VALDEZ M.D.  personally performed the services described in this documentation, as scribed by Frantz Miller in my presence, and it is both accurate and complete.

## 2017-05-30 NOTE — PROGRESS NOTES
Nephrology Progress Note, Adult, Complex               Author: Ricardo Petersen   Date & Time created: 5/30/2017  1:08 PM   Interval History:  67 y/o female with ESRD/HD, s/p cardiac arrest  On HD MWF  CXR somewhat worse, only 1L off with HD yesterday    Review of Systems:  Review of Systems   Unable to perform ROS: intubated       Physical Exam:  Physical Exam   Constitutional: She appears well-developed and well-nourished. No distress. She is intubated.   HENT:   Head: Normocephalic and atraumatic.   ETT   Eyes: Pupils are equal, round, and reactive to light.   Cardiovascular: Normal rate and regular rhythm.  Exam reveals no gallop and no friction rub.    Pulmonary/Chest: She is intubated. She has no wheezes. She has no rales.   vented   Abdominal: Soft. Bowel sounds are normal. She exhibits no distension.   Musculoskeletal: She exhibits no edema.   Nursing note and vitals reviewed.      Labs:  Recent Labs      05/28/17   0455  05/29/17   0447  05/30/17   0509   ISTATAPH  7.479  7.411  7.489   ISTATAPCO2  37.9*  40.1*  41.2*   ISTATAPO2  60*  77  58*   ISTATATCO2  29  27  33   NSVJADE2HJT  92*  95  92*   ISTATARTHCO3  28.1*  25.5*  31.3*   ISTATARTBE  4*  1  7*   ISTATTEMP  38.1 C  37.5 C  37.8 C   ISTATFIO2  40  45  45   ISTATSPEC  Arterial  Arterial  Arterial   ISTATAPHTC  7.462  7.403  7.477   [id number]  64  80  62*     Recent Labs      05/27/17   1800  05/28/17   1100   TROPONINI  1.67*  6.30*     Recent Labs      05/28/17   0452  05/29/17   0440  05/30/17   0410   SODIUM  138  138  137   POTASSIUM  4.0  4.1  3.6   CHLORIDE  101  103  98   CO2  28  26  29   BUN  20  35*  24*   CREATININE  3.57*  4.71*  3.15*   MAGNESIUM  2.0  2.1  1.9   PHOSPHORUS  2.2*  4.0  3.0   CALCIUM  8.6  8.6  8.4*     Recent Labs      05/28/17   0452 05/29/17   0440 05/30/17   0410   ALTSGPT   --   25   --    ASTSGOT   --   21   --    ALKPHOSPHAT   --   51   --    TBILIRUBIN   --   0.9   --    PREALBUMIN   --   12.0*   --    GLUCOSE   99  104*  140*     Recent Labs      17   0452  17   0440   17   1140  17   1715  17   0001  17   0410  17   0613   RBC  3.21*  3.13*   --    --    --    --   3.00*   --    HEMOGLOBIN  8.7*  8.5*   --    --    --    --   8.2*   --    HEMATOCRIT  28.0*  27.3*   --    --    --    --   25.8*   --    PLATELETCT  107*  95*   --    --    --    --   107*   --    PROTHROMBTM   --    --    --   16.4*   --    --    --    --    APTT   --    --    < >  36.6*  231.2*  212.9*   --   113.9*   INR   --    --    --   1.28*   --    --    --    --     < > = values in this interval not displayed.     Recent Labs      17   WBC  9.5  10.7  10.2   NEUTSPOLYS  80.80*  85.70*  84.00*   LYMPHOCYTES  12.90*  7.00*  9.50*   MONOCYTES  4.50  4.80  4.30   EOSINOPHILS  1.00  1.50  1.20   BASOPHILS  0.40  0.50  0.30   ASTSGOT   --   21   --    ALTSGPT   --   25   --    ALKPHOSPHAT   --   51   --    TBILIRUBIN   --   0.9   --            Hemodynamics:  Temp (24hrs), Av.6 °C (99.7 °F), Min:37.4 °C (99.3 °F), Max:37.7 °C (99.9 °F)  Temperature: 37.7 °C (99.9 °F), Monitored Temp: 38 °C (100.4 °F)  Pulse  Av.2  Min: 48  Max: 109Heart Rate (Monitored): 60  NIBP: 132/42 mmHg     Respiratory:  Ordaz Vent Mode: APVCMV, Rate (breaths/min): 16, PEEP/CPAP: 8, FiO2: 45, P Peak (PIP): 20, P MEAN: 12 Respiration: 18, Pulse Oximetry: 97 %     Work Of Breathing / Effort: Vented  RUL Breath Sounds: Clear, RML Breath Sounds: Clear, RLL Breath Sounds: Diminished, DEV Breath Sounds: Clear, LLL Breath Sounds: Diminished  Fluids:    Intake/Output Summary (Last 24 hours) at 17 1308  Last data filed at 17 1000   Gross per 24 hour   Intake 2335.78 ml   Output   2150 ml   Net 185.78 ml     Weight: 70.6 kg (155 lb 10.3 oz)  GI/Nutrition:  Orders Placed This Encounter   Procedures   • Diet NPO     Standing Status: Standing      Number of Occurrences: 1      Standing  Expiration Date:      Order Specific Question:  Restrict to:     Answer:  With Tube Feed [4]     Medical Decision Making, by Problem:  Active Hospital Problems    Diagnosis   • Cardiac arrest (CMS-[facility]) [I46.9]   • Atrial fibrillation (CMS-[facility]) [I48.91]   • ESRD (end stage renal disease) (CMS-[facility]) [N18.6]   • Acute respiratory failure with hypoxia (CMS-[facility]) [J96.01]   • HTN (hypertension) [I10]   • IDDM (insulin dependent diabetes mellitus) (CMS-[facility]) [E11.9, Z79.4]   • Dyslipidemia [E78.5]   • Cardiomyopathy (CMS-[facility]) [I42.9]   • Anemia [D64.9]   • Elevated troponin [R74.8]       Medications reviewed and Labs reviewed                      Assessment and Plan  1.ESRD/HD -MWF, will need another tx for volume  2.HTN: BP well controlled  3.Electrolytes: well controlled.  4.Anemia: Hgb 8.2, epogen  5.S/p cardiac arrest  6.Volume:well controlled with UF/HD    HD again today for volume removal purposes  Continue HD MWF

## 2017-05-30 NOTE — CARE PLAN
Problem: Ventilation Defect:  Goal: Ability to achieve and maintain unassisted ventilation or tolerate decreased levels of ventilator support  Outcome: PROGRESSING AS EXPECTED  Adult Ventilation Update    Total Vent Days: 3      Patient Lines/Drains/Airways Status    Active Airway      Name: Placement date: Placement time: Site: Days:     Airway Group ET Tube Oral 7.5 @ 23 05/27/17    Oral  2               APVcmv  16  /  320  /  +8  /  45%  No Tx    In the last 24 hours, the patient tolerated SBT for 25 minutes on settings of 5 over 8.    Rapid Shallow Breathing Index (RR/VT): 114 (05/29/17 0759)  Plateau Pressure (Q Shift): 19 (05/29/17 1507)  Static Compliance (ml / cm H2O): 25 (05/29/17 1507)    Barriers to SBT Weaning Trial Stopped due to:: RSBI >105 (Rapid Shallow Breathing Index) (05/29/17 0759)  Length of Weaning Trial Length of Weaning Trial (Hours):  (25 min) (05/29/17 0759)    Cough: Productive (05/29/17 1600)  Sputum Amount: Small (05/29/17 1600)  Sputum Color: Pink Tinged (05/29/17 1600)  Sputum Consistency: Thick (05/29/17 1600)    Events/Summary/Plan: vent check, SBT held, pt going to CT, RN aware (05/29/17 1507)

## 2017-05-30 NOTE — PROGRESS NOTES
Cardiology Progress Note               Author: Mulu Bryant Date & Time created: 2017  10:06am     67 y/o female with HTN, DM, ESRD on HD who was brought in after cardiac arrest currently intubated. New diagnosis of cardiomyopathy (EF 35%)    Interval History:  Still intubated.   Still failing SBT    Tele with intermittent NSR and A.fib. Continues to be rate controlled when in A.fib.    Review of Systems   Unable to perform ROS: intubated     Physical Exam   Constitutional:   Intubated and sedated   Neck:   IJ line in place.   Cardiovascular: Normal heart sounds and intact distal pulses.  An irregularly irregular rhythm present. Tachycardia present.  Exam reveals no gallop and no friction rub.    No murmur heard.  Pulmonary/Chest: Breath sounds normal. She has no wheezes. She has no rales.   Abdominal: Soft. She exhibits no distension.   Musculoskeletal: She exhibits no edema.   Skin: Skin is warm and dry.   Nursing note and vitals reviewed.      Hemodynamics:  Temp (24hrs), Av.6 °C (99.7 °F), Min:37.4 °C (99.3 °F), Max:37.7 °C (99.9 °F)  Temperature: 37.7 °C (99.9 °F), Monitored Temp: 38 °C (100.4 °F)  Pulse  Av.2  Min: 48  Max: 109Heart Rate (Monitored): 62  NIBP: 132/42 mmHg     Respiratory:  Ordaz Vent Mode: APVCMV, Rate (breaths/min): 16, PEEP/CPAP: 8, FiO2: 45, P Peak (PIP): 21, P MEAN: 12 Respiration: 18, Pulse Oximetry: 96 %     Work Of Breathing / Effort: Vented  RUL Breath Sounds: Clear, RML Breath Sounds: Clear, RLL Breath Sounds: Diminished, DEV Breath Sounds: Expiratory Wheezes, LLL Breath Sounds: Diminished  Fluids:  Date 17 0700 - 17 0659   Shift 0406-6078 1880-7015 7751-9540 24 Hour Total   I  N  T  A  K  E   I.V. 145.8 191.4  337.2    Other 210 60  270    Dialysis  600  600    Enteral 175 180  355    Shift Total 530.8 1031.4  1562.2   O  U  T  P  U  T   Urine  275  275    Dialysis  1600  1600    Shift Total  1875  1875   Weight (kg) 71.4 71.4 71.4 71.4       Weight: 70.6  kg (155 lb 10.3 oz)  GI/Nutrition:  Orders Placed This Encounter   Procedures   • Diet NPO     Standing Status: Standing      Number of Occurrences: 1      Standing Expiration Date:      Order Specific Question:  Restrict to:     Answer:  With Tube Feed [4]     Lab Results:  Recent Labs      05/28/17   0452  05/29/17   0440  05/30/17   0410   WBC  9.5  10.7  10.2   RBC  3.21*  3.13*  3.00*   HEMOGLOBIN  8.7*  8.5*  8.2*   HEMATOCRIT  28.0*  27.3*  25.8*   MCV  87.2  87.2  86.0   MCH  27.1  27.2  27.3   MCHC  31.1*  31.1*  31.8*   RDW  57.7*  58.1*  58.0*   PLATELETCT  107*  95*  107*   MPV  11.1  10.6  10.7     Recent Labs      05/28/17   0452  05/29/17   0440  05/30/17   0410   SODIUM  138  138  137   POTASSIUM  4.0  4.1  3.6   CHLORIDE  101  103  98   CO2  28  26  29   GLUCOSE  99  104*  140*   BUN  20  35*  24*   CREATININE  3.57*  4.71*  3.15*   CALCIUM  8.6  8.6  8.4*     Recent Labs      05/29/17   1140  05/29/17   1715  05/30/17   0001  05/30/17   0613   APTT  36.6*  231.2*  212.9*  113.9*   INR  1.28*   --    --    --          Recent Labs      05/27/17   1230  05/27/17   1800  05/28/17   1100   TROPONINI  0.93*  1.67*  6.30*     Recent Labs      05/27/17   2335  05/29/17   0440   TRIGLYCERIDE  148  87   HDL  27*   --    LDL  9   --      TSH normal    Echo  CONCLUSIONS  No prior study is available for comparison.   Normal left ventricular chamber size. Normal left ventricular wall   thickness. Moderately reduced left ventricular systolic function. Left   ventricular ejection fraction is visually estimated to be 35%. Normal   regional wall motion. Grade II diastolic dysfunction.  Moderately dilated left atrium.  Moderate mitral regurgitation.  Estimated right ventricular systolic pressure is at least 45 mmHg.  IVC not visualized.      Medical Decision Making, by Problem:  Active Hospital Problems    Diagnosis   • Cardiac arrest (CMS-HCC) [I46.9]   • Atrial fibrillation (CMS-HCC) [I48.91]   • ESRD (end stage  renal disease) (CMS-HCC) [N18.6]   • Acute respiratory failure with hypoxia (CMS-HCC) [J96.01]   • Aspiration pneumonia (CMS-HCC) [J69.0]   • HTN (hypertension) [I10]   • IDDM (insulin dependent diabetes mellitus) (CMS-HCC) [E11.9, Z79.4]   • Dyslipidemia [E78.5]   • Cardiomyopathy (CMS-HCC) [I42.9]   • Anemia [D64.9]   • Elevated troponin [R74.8]       Plan:    Cardiac arrest  Cardiomyopathy  NSTEMI (type 1 vs 2 in the setting of cardiac arrest)  Will need cardiac catheterization. Still intubated. Given her cardiac arrest presentation, would like to make sure her neuro status is normal before invasive angiography. Will await extubation. Anemia and thrombocytopenia essentially stable. She is on heparin gtt along with ASA, statin and coreg.    P. A.fib - rate controlled. On coreg. Oral amiodarone load started yesterday. On heparin gtt for anticoagulation.    Thank you for allowing me to participate in the care of this patient. Please do not hesitate to contact me with any questions.    Mulu Bryant MD  Cardiologist  [facility]    [employer]

## 2017-05-30 NOTE — PROGRESS NOTES
Cardiology Progress Note               Author: Mulu Bryant Date & Time created: 2017  10:06am     65 y/o female with HTN, DM, ESRD on HD who was brought in after cardiac arrest currently intubated. Possible new diagnosis of cardiomyopathy (EF 35%)    Interval History:  Still intubated.   Failed SBT per bedside nurse  Tele with intermittent NSR and A.fib. Rate controlled when in A.fib.      Review of Systems   Unable to perform ROS: intubated       Physical Exam   Constitutional:   Intubated and sedated   Neck:   IJ line in place.   Cardiovascular: Normal heart sounds and intact distal pulses.  An irregularly irregular rhythm present. Tachycardia present.  Exam reveals no gallop and no friction rub.    No murmur heard.  Pulmonary/Chest: Breath sounds normal. She has no wheezes. She has no rales.   Abdominal: Soft. She exhibits no distension.   Musculoskeletal: She exhibits no edema.   Skin: Skin is warm and dry.   Nursing note and vitals reviewed.      Hemodynamics:  Temp (24hrs), Av.4 °C (99.3 °F), Min:37.4 °C (99.3 °F), Max:37.4 °C (99.3 °F)  Temperature: 37.4 °C (99.3 °F), Monitored Temp: 37.5 °C (99.5 °F)  Pulse  Av.1  Min: 48  Max: 109Heart Rate (Monitored): (!) 103  NIBP: 105/52 mmHg     Respiratory:  Ordaz Vent Mode: APVCMV, Rate (breaths/min): 16, PEEP/CPAP: 8, FiO2: 45, P Peak (PIP): 16, P MEAN: 11 Respiration: 18, Pulse Oximetry: 99 %     Work Of Breathing / Effort: Vented  RUL Breath Sounds: Clear, RML Breath Sounds: Diminished, RLL Breath Sounds: Diminished, DEV Breath Sounds: Clear, LLL Breath Sounds: Diminished  Fluids:  Date 17 0700 - 17 0659   Shift 8191-2133 8312-3139 3333-3460 24 Hour Total   I  N  T  A  K  E   I.V. 145.8 191.4  337.2    Other 210 60  270    Dialysis  600  600    Enteral 175 180  355    Shift Total 530.8 1031.4  1562.2   O  U  T  P  U  T   Urine  275  275    Dialysis  1600  1600    Shift Total  1875  1875   Weight (kg) 71.4 71.4 71.4 71.4       Weight:  71.4 kg (157 lb 6.5 oz)  GI/Nutrition:  Orders Placed This Encounter   Procedures   • Diet NPO     Standing Status: Standing      Number of Occurrences: 1      Standing Expiration Date:      Order Specific Question:  Restrict to:     Answer:  With Tube Feed [4]     Lab Results:  Recent Labs      05/27/17   0110  05/28/17   0452  05/29/17   0440   WBC  10.5  9.5  10.7   RBC  3.86*  3.21*  3.13*   HEMOGLOBIN  10.7*  8.7*  8.5*   HEMATOCRIT  36.2*  28.0*  27.3*   MCV  93.8  87.2  87.2   MCH  27.7  27.1  27.2   MCHC  29.6*  31.1*  31.1*   RDW  61.6*  57.7*  58.1*   PLATELETCT  92*  107*  95*   MPV  11.5  11.1  10.6     Recent Labs      05/27/17   1230  05/28/17   0452  05/29/17   0440   SODIUM  138  138  138   POTASSIUM  4.3  4.0  4.1   CHLORIDE  102  101  103   CO2  28  28  26   GLUCOSE  133*  99  104*   BUN  30*  20  35*   CREATININE  4.74*  3.57*  4.71*   CALCIUM  8.7  8.6  8.6     Recent Labs      05/27/17   0110  05/29/17   1140  05/29/17   1715   APTT  34.1  36.6*  231.2*   INR  1.20*  1.28*   --      Recent Labs      05/27/17   0110   BNPBTYPENAT  988*     Recent Labs      05/27/17   0110   05/27/17   1230  05/27/17   1800  05/28/17   1100   TROPONINI  0.05*   < >  0.93*  1.67*  6.30*   BNPBTYPENAT  988*   --    --    --    --     < > = values in this interval not displayed.     Recent Labs      05/27/17   2335  05/29/17   0440   TRIGLYCERIDE  148  87   HDL  27*   --    LDL  9   --        Echo  CONCLUSIONS  No prior study is available for comparison.   Normal left ventricular chamber size. Normal left ventricular wall   thickness. Moderately reduced left ventricular systolic function. Left   ventricular ejection fraction is visually estimated to be 35%. Normal   regional wall motion. Grade II diastolic dysfunction.  Moderately dilated left atrium.  Moderate mitral regurgitation.  Estimated right ventricular systolic pressure is at least 45 mmHg.  IVC not visualized.      Medical Decision Making, by  Problem:  Active Hospital Problems    Diagnosis   • Cardiac arrest (CMS-HCC) [I46.9]   • Atrial fibrillation (CMS-HCC) [I48.91]   • ESRD (end stage renal disease) (CMS-HCC) [N18.6]   • Acute respiratory failure with hypoxia (CMS-HCC) [J96.01]   • Aspiration pneumonia (CMS-HCC) [J69.0]   • HTN (hypertension) [I10]   • IDDM (insulin dependent diabetes mellitus) (CMS-HCC) [E11.9, Z79.4]   • Dyslipidemia [E78.5]   • Cardiomyopathy (CMS-HCC) [I42.9]   • Anemia [D64.9]   • Elevated troponin [R74.8]       Plan:    Cardiac arrest  Cardiomyopathy  NSTEMI (type 1 vs 2 in the setting of cardiac arrest)  Will need cardiac catheterization. Still intubated. Given her cardiac arrest presentation, would like to make sure her neuro status is normal before invasive angiography. Will await extubation. Also her anemia and thrombocytopenia is another concern, will follow. She is on heparin gtt. Will start ASA and statin for now. On coreg    A.fib - rate controlled. On coreg. Will start oral amiodarone load. On heparin gtt for anticoagulation. Will check TSH.     Thank you for allowing me to participate in the care of this patient. Please do not hesitate to contact me with any questions.    Mulu Byrant MD  Cardiologist  [facility]    [employer]

## 2017-05-30 NOTE — CARE PLAN
Problem: Communication  Goal: The ability to communicate needs accurately and effectively will improve  Intervention: Educate patient and significant other/support system about the plan of care, procedures, treatments, medications and allow for questions  Discussed plan of care with family members regarding CT scan, atrial fibrillation and heparin drip.  They expressed understanding and agree to plan.      Problem: Respiratory:  Goal: Respiratory status will improve  Intervention: Assess and monitor pulmonary status  Pt had SBT this morning and tolerated poorly, unable to maintain > 25 minutes.  Plan to attempt later tonight or tomorrow per order per RT.  Unable to complete afternoon SBT due to CT scan scheduled for today.

## 2017-05-30 NOTE — RESPIRATORY CARE
Ventilator Weaning Update    Patient is on vent day 4.  SBT was tolerated for a minimum of  (20 min) hours on settings of 8 over 8.    Wean parameters for this SBT were:        Rapid Shallow Breathing Index (RR/VT): 119 (05/30/17 0654)  RR (bpm): 27 (05/30/17 0654)  Spontaneous VE: 6.4 (05/30/17 0654)  Spontaneous VT: 232 (05/30/17 0654)     Events/Summary/Plan: RSBI> 105 (119) (05/30/17 0654)

## 2017-05-30 NOTE — PROGRESS NOTES
Mili from Lab called with critical result of 239.2 at 1800. Critical lab result read back to Mili.   This critical lab result is within parameters established by  for this patient on the heparin weight based protocol.

## 2017-05-30 NOTE — CARE PLAN
Problem: Nutritional:  Goal: Nutrition support tolerated and meeting greater than 85% of estimated needs  Outcome: PROGRESSING AS EXPECTED  TF @ goal with propofol.

## 2017-05-30 NOTE — PROGRESS NOTES
Pt returned from CT scan, tolerated procedure without difficulty per report.  Dialysis team notified to initiate dialysis as ordered.

## 2017-05-31 ENCOUNTER — APPOINTMENT (OUTPATIENT)
Dept: RADIOLOGY | Facility: MEDICAL CENTER | Age: 67
DRG: 264 | End: 2017-05-31
Attending: INTERNAL MEDICINE
Payer: MEDICARE

## 2017-05-31 LAB
ANION GAP SERPL CALC-SCNC: 7 MMOL/L (ref 0–11.9)
APTT PPP: 71 SEC (ref 24.7–36)
BASE EXCESS BLDA CALC-SCNC: 8 MMOL/L (ref -4–3)
BASOPHILS # BLD AUTO: 0.3 % (ref 0–1.8)
BASOPHILS # BLD: 0.02 K/UL (ref 0–0.12)
BODY TEMPERATURE: ABNORMAL DEGREES
BUN SERPL-MCNC: 25 MG/DL (ref 8–22)
CALCIUM SERPL-MCNC: 9.1 MG/DL (ref 8.5–10.5)
CHLORIDE SERPL-SCNC: 97 MMOL/L (ref 96–112)
CO2 BLDA-SCNC: 34 MMOL/L (ref 20–33)
CO2 SERPL-SCNC: 30 MMOL/L (ref 20–33)
CREAT SERPL-MCNC: 2.74 MG/DL (ref 0.5–1.4)
EOSINOPHIL # BLD AUTO: 0.08 K/UL (ref 0–0.51)
EOSINOPHIL NFR BLD: 1.1 % (ref 0–6.9)
ERYTHROCYTE [DISTWIDTH] IN BLOOD BY AUTOMATED COUNT: 56.3 FL (ref 35.9–50)
GFR SERPL CREATININE-BSD FRML MDRD: 17 ML/MIN/1.73 M 2
GLUCOSE BLD-MCNC: 138 MG/DL (ref 65–99)
GLUCOSE BLD-MCNC: 146 MG/DL (ref 65–99)
GLUCOSE BLD-MCNC: 173 MG/DL (ref 65–99)
GLUCOSE BLD-MCNC: 181 MG/DL (ref 65–99)
GLUCOSE SERPL-MCNC: 156 MG/DL (ref 65–99)
HCO3 BLDA-SCNC: 33.1 MMOL/L (ref 17–25)
HCT VFR BLD AUTO: 24.3 % (ref 37–47)
HGB BLD-MCNC: 7.7 G/DL (ref 12–16)
IMM GRANULOCYTES # BLD AUTO: 0.07 K/UL (ref 0–0.11)
IMM GRANULOCYTES NFR BLD AUTO: 1 % (ref 0–0.9)
LYMPHOCYTES # BLD AUTO: 0.88 K/UL (ref 1–4.8)
LYMPHOCYTES NFR BLD: 12.2 % (ref 22–41)
MCH RBC QN AUTO: 27 PG (ref 27–33)
MCHC RBC AUTO-ENTMCNC: 31.7 G/DL (ref 33.6–35)
MCV RBC AUTO: 85.3 FL (ref 81.4–97.8)
MONOCYTES # BLD AUTO: 0.44 K/UL (ref 0–0.85)
MONOCYTES NFR BLD AUTO: 6.1 % (ref 0–13.4)
NEUTROPHILS # BLD AUTO: 5.72 K/UL (ref 2–7.15)
NEUTROPHILS NFR BLD: 79.3 % (ref 44–72)
NRBC # BLD AUTO: 0 K/UL
NRBC BLD AUTO-RTO: 0 /100 WBC
O2/TOTAL GAS SETTING VFR VENT: 45 %
PCO2 BLDA: 45 MMHG (ref 26–37)
PCO2 TEMP ADJ BLDA: 45.4 MMHG (ref 26–37)
PH BLDA: 7.47 [PH] (ref 7.4–7.5)
PH TEMP ADJ BLDA: 7.47 [PH] (ref 7.4–7.5)
PLATELET # BLD AUTO: 111 K/UL (ref 164–446)
PMV BLD AUTO: 10.3 FL (ref 9–12.9)
PO2 BLDA: 70 MMHG (ref 64–87)
PO2 TEMP ADJ BLDA: 71 MMHG (ref 64–87)
POTASSIUM SERPL-SCNC: 4.2 MMOL/L (ref 3.6–5.5)
RBC # BLD AUTO: 2.85 M/UL (ref 4.2–5.4)
SAO2 % BLDA: 95 % (ref 93–99)
SODIUM SERPL-SCNC: 134 MMOL/L (ref 135–145)
SPECIMEN DRAWN FROM PATIENT: ABNORMAL
WBC # BLD AUTO: 7.2 K/UL (ref 4.8–10.8)

## 2017-05-31 PROCEDURE — 99233 SBSQ HOSP IP/OBS HIGH 50: CPT | Performed by: HOSPITALIST

## 2017-05-31 PROCEDURE — A9270 NON-COVERED ITEM OR SERVICE: HCPCS | Performed by: INTERNAL MEDICINE

## 2017-05-31 PROCEDURE — 71010 DX-CHEST-PORTABLE (1 VIEW): CPT

## 2017-05-31 PROCEDURE — 700102 HCHG RX REV CODE 250 W/ 637 OVERRIDE(OP): Performed by: INTERNAL MEDICINE

## 2017-05-31 PROCEDURE — 82962 GLUCOSE BLOOD TEST: CPT | Mod: 91

## 2017-05-31 PROCEDURE — 700105 HCHG RX REV CODE 258: Performed by: INTERNAL MEDICINE

## 2017-05-31 PROCEDURE — 80048 BASIC METABOLIC PNL TOTAL CA: CPT

## 2017-05-31 PROCEDURE — 700111 HCHG RX REV CODE 636 W/ 250 OVERRIDE (IP): Performed by: INTERNAL MEDICINE

## 2017-05-31 PROCEDURE — 94150 VITAL CAPACITY TEST: CPT

## 2017-05-31 PROCEDURE — 71010 DX-CHEST-LIMITED (1 VIEW): CPT

## 2017-05-31 PROCEDURE — 94003 VENT MGMT INPAT SUBQ DAY: CPT

## 2017-05-31 PROCEDURE — 770022 HCHG ROOM/CARE - ICU (200)

## 2017-05-31 PROCEDURE — 82803 BLOOD GASES ANY COMBINATION: CPT

## 2017-05-31 PROCEDURE — 99291 CRITICAL CARE FIRST HOUR: CPT | Performed by: INTERNAL MEDICINE

## 2017-05-31 PROCEDURE — 94690 O2 UPTK REST INDIRECT: CPT

## 2017-05-31 PROCEDURE — P9047 ALBUMIN (HUMAN), 25%, 50ML: HCPCS | Performed by: INTERNAL MEDICINE

## 2017-05-31 PROCEDURE — 90935 HEMODIALYSIS ONE EVALUATION: CPT

## 2017-05-31 PROCEDURE — 85025 COMPLETE CBC W/AUTO DIFF WBC: CPT

## 2017-05-31 PROCEDURE — 85730 THROMBOPLASTIN TIME PARTIAL: CPT

## 2017-05-31 RX ORDER — CARVEDILOL 6.25 MG/1
6.25 TABLET ORAL 2 TIMES DAILY WITH MEALS
Status: DISCONTINUED | OUTPATIENT
Start: 2017-05-31 | End: 2017-06-07 | Stop reason: HOSPADM

## 2017-05-31 RX ORDER — ALBUMIN (HUMAN) 12.5 G/50ML
12.5 SOLUTION INTRAVENOUS
Status: DISCONTINUED | OUTPATIENT
Start: 2017-05-31 | End: 2017-06-07 | Stop reason: HOSPADM

## 2017-05-31 RX ADMIN — ATORVASTATIN CALCIUM 40 MG: 40 TABLET, FILM COATED ORAL at 20:29

## 2017-05-31 RX ADMIN — CARVEDILOL 6.25 MG: 6.25 TABLET, FILM COATED ORAL at 16:12

## 2017-05-31 RX ADMIN — AMIODARONE HYDROCHLORIDE 400 MG: 200 TABLET ORAL at 20:28

## 2017-05-31 RX ADMIN — STANDARDIZED SENNA CONCENTRATE AND DOCUSATE SODIUM 2 TABLET: 8.6; 5 TABLET, FILM COATED ORAL at 08:13

## 2017-05-31 RX ADMIN — METRONIDAZOLE 500 MG: 500 TABLET ORAL at 12:58

## 2017-05-31 RX ADMIN — CEFTRIAXONE SODIUM 1 G: 1 INJECTION, POWDER, FOR SOLUTION INTRAMUSCULAR; INTRAVENOUS at 08:13

## 2017-05-31 RX ADMIN — LINEZOLID 600 MG: 600 TABLET, FILM COATED ORAL at 08:13

## 2017-05-31 RX ADMIN — PROPOFOL 20 MCG/KG/MIN: 10 INJECTION, EMULSION INTRAVENOUS at 16:53

## 2017-05-31 RX ADMIN — AMIODARONE HYDROCHLORIDE 400 MG: 200 TABLET ORAL at 08:14

## 2017-05-31 RX ADMIN — CHLORHEXIDINE GLUCONATE 15 ML: 1.2 RINSE ORAL at 08:14

## 2017-05-31 RX ADMIN — METRONIDAZOLE 500 MG: 500 TABLET ORAL at 05:43

## 2017-05-31 RX ADMIN — ASPIRIN 81 MG: 81 TABLET ORAL at 08:14

## 2017-05-31 RX ADMIN — STANDARDIZED SENNA CONCENTRATE AND DOCUSATE SODIUM 2 TABLET: 8.6; 5 TABLET, FILM COATED ORAL at 21:00

## 2017-05-31 RX ADMIN — INSULIN LISPRO 3 UNITS: 100 INJECTION, SOLUTION INTRAVENOUS; SUBCUTANEOUS at 23:54

## 2017-05-31 RX ADMIN — CHLORHEXIDINE GLUCONATE 15 ML: 1.2 RINSE ORAL at 20:29

## 2017-05-31 RX ADMIN — PROPOFOL 26 MCG/KG/MIN: 10 INJECTION, EMULSION INTRAVENOUS at 08:13

## 2017-05-31 RX ADMIN — INSULIN LISPRO 3 UNITS: 100 INJECTION, SOLUTION INTRAVENOUS; SUBCUTANEOUS at 18:23

## 2017-05-31 RX ADMIN — FENTANYL CITRATE 50 MCG: 50 INJECTION, SOLUTION INTRAMUSCULAR; INTRAVENOUS at 21:36

## 2017-05-31 RX ADMIN — METRONIDAZOLE 500 MG: 500 TABLET ORAL at 20:29

## 2017-05-31 RX ADMIN — FAMOTIDINE 20 MG: 20 TABLET, FILM COATED ORAL at 08:14

## 2017-05-31 RX ADMIN — FENTANYL CITRATE 50 MCG: 50 INJECTION, SOLUTION INTRAMUSCULAR; INTRAVENOUS at 12:51

## 2017-05-31 RX ADMIN — LINEZOLID 600 MG: 600 TABLET, FILM COATED ORAL at 20:29

## 2017-05-31 RX ADMIN — ERYTHROPOIETIN 10000 UNITS: 10000 INJECTION, SOLUTION INTRAVENOUS; SUBCUTANEOUS at 08:58

## 2017-05-31 RX ADMIN — Medication 12.5 G: at 10:16

## 2017-05-31 ASSESSMENT — PULMONARY FUNCTION TESTS: FVC: .45

## 2017-05-31 NOTE — PROGRESS NOTES
Pulmonary Critical Care Progress Note      Date of service: 5/31/2017    Chief Complaint: Cardiac Arrest    History of Present Illness: Patient is a 65 y/o F, history of end stage renal disease, Right AV fistula, chronic HD, DM, admitted through the ED on 5/27/2017 after out of hospital cardiac arrest asystole with ROSC after two rounds of epi. Intubated remains on full ventilator support but now awake and following commands in [place]onian.     ROS:    Respiratory: unable to perform due to the patient's inability to effectively communicate,   Cardiac: unable to perform due to the patient's inability to effectively communicate,   GI: unable to perform due to the patient's inability to effectively communicate.      Interval Events:  24 hour interval history reviewed     Follows with daughter translating while off propofol   SB  Afebrile   Goal on TF and tolerating well   Day 5 on vent   PEEP of 8 45% FiO2  SBT for 20 min yesterday   Heparin for A fib   Holding warafarin for cath   Ceftriaxone and Flagyl  On small sliding scale     CXR: Unchanged, right greater than left pleural effusions, basilar atelectasis, interstitial edema.     PFSH:  No change.    Physical Exam  General: Sedate on propofol.   Neuro/Psych: Arouses, follows commands with family at bedside in [place]. Moving all extremities symmetrically. PERRL. Cortrak in place. ET tube in place. Neck supple. Trachea midline. Triple lumed site CDI.   HEENT: Right nare coretrak, Supple, midline, no crepitus. Right IJ CVC CDI  CVS: Distant, regular.   Respiratory: Moderately diminished, clear. No wheezing.   Abdomen/: Mildly distended. Soft. NT. Tolerating tube feed. Bowel sounds present.   Extremities: Distal pulses 2+ bilateral.   Skin: Cool, dry, perfused. Capillary refill intact.       Respiratory:  [place] Vent Mode: APVCMV, Rate (breaths/min): 16, Vt Target (mL): 320, PEEP/CPAP: 8, FiO2: 45, Static Compliance (ml / cm H2O): 28.5, Control VTE (exp VT): 322  Pulse  Oximetry: 96 %  ImagingAvailable data reviewed   Recent Labs      05/29/17   0447  05/30/17   0509   ISTATAPH  7.411  7.489   ISTATAPCO2  40.1*  41.2*   ISTATAPO2  77  58*   ISTATATCO2  27  33   [id number]  95  92*   ISTATARTHCO3  25.5*  31.3*   ISTATARTBE  1  7*   ISTATTEMP  37.5 C  37.8 C   ISTATFIO2  45  45   ISTATSPEC  Arterial  Arterial   ISTATAPHTC  7.403  7.477   [id number]  80  62*     HemoDynamics:  Pulse: (!) 58, Heart Rate (Monitored): (!) 59  NIBP: 142/45 mmHg    Imaging: Available data reviewed     ECHO 5/27:  CONCLUSIONS  No prior study is available for comparison.   Normal left ventricular chamber size. Normal left ventricular wall thickness. Moderately reduced left ventricular systolic function. Left ventricular ejection fraction is visually estimated to be 35%. Normal   regional wall motion.   Grade II diastolic dysfunction.  Moderately dilated left atrium.  Moderate mitral regurgitation.  Estimated right ventricular systolic pressure is at least 45 mmHg.  IVC not visualized.    Recent Labs      05/28/17   1100   TROPONINI  6.30*     Neuro:  GCS Total Gibbs Coma Score: 11  Imaging: Available data reviewed    Fluids:  Intake/Output       05/29/17 0700 - 05/30/17 0659 05/30/17 0700 - 05/31/17 0659 05/31/17 0700 - 06/01/17 0659      0841-8701 2112-2618 Total 0325-7304 1720-3749 Total 2999-5387 7024-9569 Total       Intake    I.V.  273.4  345.7 619.1  615.9  480.8 1096.7  --  -- --    Propofol Volume 130.8 130.8 261.6 114.5 130.8 245.3 -- -- --    Heparin Volume 142.6 214.9 357.5 181.4 180 361.4 -- -- --    IV Volume -- -- -- 120 20 140 -- -- --    ABX -- -- -- 200 150 350 -- -- --    Other  240  30 270  120  90 210  --  -- --    Medications (P.O./ Enteral Liquids) 240 30 270 120 90 210 -- -- --    Dialysis  --  600 600  --  500 500  --  -- --    Dialysis Volume (Dialysis Intake) -- 600 600 -- 500 500 -- -- --    Enteral  275  480 755  540  570 1110  --  -- --    Enteral Volume 275 390 664 710 430  960 -- -- --    Free Water / Tube Flush -- 90 90 60 90 150 -- -- --    Total Intake 788.4 1455.7 2244.1 1275.9 1640.8 2916.7 -- -- --       Output    Urine  275  200 475  300  150 450  --  -- --    Indwelling Cathether 275 200 475 300 150 450 -- -- --    Dialysis  --  1600 1600  --  2200 2200  --  -- --    Dialysis Output (Dialysis Output) -- 1600 1600 -- 2200 2200 -- -- --    Stool  --  -- --  --  -- --  --  -- --    Number of Times Stooled 0 x -- 0 x -- -- -- -- -- --    Total Output 275 1800 2075 300 2350 2650 -- -- --       Net I/O     513.4 -344.3 169.1 975.9 -709.2 266.7 -- -- --        Weight: 72.2 kg (159 lb 2.8 oz)  Recent Labs      05/29/17 0440 05/30/17 0410 05/31/17 0415   SODIUM  138  137  134*   POTASSIUM  4.1  3.6  4.2   CHLORIDE  103  98  97   CO2  26  29  30   BUN  35*  24*  25*   CREATININE  4.71*  3.15*  2.74*   MAGNESIUM  2.1  1.9   --    PHOSPHORUS  4.0  3.0   --    CALCIUM  8.6  8.4*  9.1     GI/Nutrition:  Imaging: Available data reviewed  NPO and tube feed Tolerated at 40.     Liver Function:  Recent Labs      05/29/17 0440 05/30/17 0410 05/31/17 0415   ALTSGPT  25   --    --    ASTSGOT  21   --    --    ALKPHOSPHAT  51   --    --    TBILIRUBIN  0.9   --    --    PREALBUMIN  12.0*   --    --    GLUCOSE  104*  140*  156*     Heme:  Recent Labs      05/29/17   0440 05/29/17   1140   05/30/17   0410 05/30/17   1235  05/30/17   1955  05/31/17   0150  05/31/17 0415   RBC  3.13*   --    --   3.00*   --    --    --    --   2.85*   HEMOGLOBIN  8.5*   --    --   8.2*   --    --    --    --   7.7*   HEMATOCRIT  27.3*   --    --   25.8*   --    --    --    --   24.3*   PLATELETCT  95*   --    --   107*   --    --    --    --   111*   PROTHROMBTM   --   16.4*   --    --    --    --    --    --    --    APTT   --   36.6*   < >   --    < >  88.2*  81.1*  71.0*   --    INR   --   1.28*   --    --    --    --    --    --    --     < > = values in this interval not displayed.      Infectious Disease:  Temp  Av.1 °C (98.8 °F)  Min: 37.1 °C (98.8 °F)  Max: 37.1 °C (98.8 °F)  Monitored Temp  Av.4 °C (99.4 °F)  Min: 37 °C (98.6 °F)  Max: 38.2 °C (100.8 °F)  Micro: reviewed   Ceftriaxone and Flagyl.   ? Staph aureus pending.   Recent Labs      17   0440  17   0410  17   0415   WBC  10.7  10.2  7.2   NEUTSPOLYS  85.70*  84.00*  79.30*   LYMPHOCYTES  7.00*  9.50*  12.20*   MONOCYTES  4.80  4.30  6.10   EOSINOPHILS  1.50  1.20  1.10   BASOPHILS  0.50  0.30  0.30   ASTSGOT  21   --    --    ALTSGPT  25   --    --    ALKPHOSPHAT  51   --    --    TBILIRUBIN  0.9   --    --      Current Facility-Administered Medications   Medication Dose Frequency Provider Last Rate Last Dose   • magnesium sulfate ivpb premix 1 g  1 g Once Carlos Manuel Man M.D.   Stopped at 17 1045   • linezolid (ZYVOX) tablet 600 mg  600 mg Q12HRS Carlos Manuel Man M.D.   600 mg at 17   • NS (BOLUS) infusion 250 mL  250 mL DIALYSIS PRN Ricardo Petersen M.D.       • epoetin micheal (EPOGEN,PROCRIT) injection 10,000 Units  10,000 Units MO, WE +  Ricardo Petersen M.D.       • NS (BOLUS) infusion 250 mL  250 mL DIALYSIS PRN Ricardo Petersen M.D.       • metronidazole (FLAGYL) tablet 500 mg  500 mg Q8HRS Carlos Manuel Man M.D.   500 mg at 17 0543   • heparin 1000 units/mL injection 2,600 Units  2,600 Units PRN Ricardo Castaneda M.D.        And   • heparin infusion 25,000 units in 500 ml 0.45% nacl   Continuous Ricardo Castaneda M.D. 15 mL/hr at 17 0313 750 Units/hr at 17 0313   • aspirin EC (ECOTRIN) tablet 81 mg  81 mg DAILY Mulu Bryant M.D.   Stopped at 17 0757   • atorvastatin (LIPITOR) tablet 40 mg  40 mg QHS Mulu Bryant M.D.   40 mg at 17   • amiodarone (CORDARONE) tablet 400 mg  400 mg TWICE DAILY Mulu Bryant M.D.   400 mg at 17   • [START ON 2017] amiodarone (CORDARONE) tablet 200 mg  200 mg TWICE DAILY Mulu Bryant M.D.       • [START ON  6/13/2017] amiodarone (CORDARONE) tablet 200 mg  200 mg Q DAY Mulu Bryant M.D.       • acetaminophen (TYLENOL) suppository 650 mg  650 mg Q6HRS PRN Dragan Martinez M.D.   650 mg at 05/28/17 0444   • carvedilol (COREG) tablet 12.5 mg  12.5 mg BID WITH MEALS Carleen Stewart M.D.   Stopped at 05/30/17 1730   • cefTRIAXone (ROCEPHIN) 1 g in  mL IVPB  1 g Q24HRS Avinash Millan M.D.   Stopped at 05/30/17 1018   • Respiratory Care per Protocol   Continuous RT Dragan Martinez M.D.       • senna-docusate (PERICOLACE or SENOKOT S) 8.6-50 MG per tablet 2 Tab  2 Tab BID Dragan Martinez M.D.   2 Tab at 05/30/17 2122    And   • polyethylene glycol/lytes (MIRALAX) PACKET 1 Packet  1 Packet QDAY PRN Dragan Martinez M.D.   1 Packet at 05/30/17 2121    And   • magnesium hydroxide (MILK OF MAGNESIA) suspension 30 mL  30 mL QDAY PRN Dragan Martinez M.D.        And   • bisacodyl (DULCOLAX) suppository 10 mg  10 mg QDAY PRN Dragan Martinez M.D.       • chlorhexidine (PERIDEX) 0.12 % solution 15 mL  15 mL BID Dragan Martinez M.D.   15 mL at 05/30/17 2121   • lidocaine (XYLOCAINE) 1%  injection  1-2 mL Q30 MIN PRN Dragan Martinez M.D.       • NS infusion   Continuous Draagn Martinez M.D. 10 mL/hr at 05/30/17 1945     • fentaNYL (SUBLIMAZE) injection 25 mcg  25 mcg Q HOUR PRN Dragan Martinez M.D.   25 mcg at 05/30/17 2144    Or   • fentaNYL (SUBLIMAZE) injection 50 mcg  50 mcg Q HOUR PRN Dragan Martinez M.D.   50 mcg at 05/29/17 2244    Or   • fentaNYL (SUBLIMAZE) injection 100 mcg  100 mcg Q HOUR PRN Dragan Martinez M.D.   100 mcg at 05/27/17 2101   • ipratropium-albuterol (DUONEB) nebulizer solution 3 mL  3 mL Q2HRS PRN (RT) Dragan Martinez M.D.       • famotidine (PEPCID) tablet 20 mg  20 mg DAILY Dragan Martinez M.D.   20 mg at 05/30/17 2906   • propofol (DIPRIVAN) injection  5-80 mcg/kg/min Continuous Dragan Martinez  M.D. 10.9 mL/hr at 05/30/17 2242 25 mcg/kg/min at 05/30/17 2242   • enalaprilat (VASOTEC) injection 1.25 mg  1.25 mg Q6HRS PRN ISABEL Richards.O.       • labetalol (NORMODYNE,TRANDATE) injection 10 mg  10 mg Q4HRS PRN GERARDO RichardsO.       • ondansetron (ZOFRAN) syringe/vial injection 4 mg  4 mg Q4HRS PRN ISABEL Richards.O.       • ondansetron (ZOFRAN ODT) dispertab 4 mg  4 mg Q4HRS PRN GERARDO RichardsO.       • acetaminophen (TYLENOL) tablet 650 mg  650 mg Q6HRS PRN ISABEL Richards.O.       • insulin lispro (HUMALOG) injection 1-6 Units  1-6 Units Q6HRS ISABEL Richards.O.   Stopped at 05/31/17 0600   • glucose 4 g chewable tablet 16 g  16 g Q15 MIN PRN GERARDO RichardsO.        And   • dextrose 50% (D50W) injection 25 mL  25 mL Q15 MIN PRN Dragan Sampson D.O.         This patient's medications have not been reviewed.    Quality  Measures:  Labs reviewed, Medications reviewed and Radiology images reviewed  Patricia catheter: Critically Ill - Requiring Accurate Measurement of Urinary Output, Unconscious / Sedated Patient on a Ventilator and No Patricia      DVT Prophylaxis: Heparin  DVT prophylaxis - mechanical: SCDs  Ulcer prophylaxis: Yes    Assessed for rehab: Patient unable to tolerate rehabilitation therapeutic regimen    Assessment and Plan:  Ventilator-dependent respiratory failure.   - Intubated AM 5/27   - SBTs    - RT protocols  Abnormal CXR - RLL ATX/pna   - BAL MRSA   - zyvox and C3/flagyl  Status post witnessed out-of-hospital asystolic cardiac arrest.    - Cardiology following- primary  Cardiac arrest/NSTEMI?/valvular heart Dz/myop   - Cath after off vent; d/w'd cardiology today   - correct lytes  Cardiomyopathy - EF 35%   Grade II DD  PHTN - mild-moderate RVSP 45   Secondary to MR? Moderate by ECHO  No anoxic brain injury.  CT scan of the head shows no acute pathology.     With sedation vacation patient following well despite language barrier  Anemia with thrombocytopenia.   Serial lab - transfuse  PRN  End-stage renal disease, on maintenance hemodialysis.   - Nephro following for HD  Chronic AF   - on warfarin at home   - heparin gtt  Diabetes mellitus - glycemic control  Enteral nutrrition - Cortrak  ERP  Mobilize  Prophylaxis       Daily SBTs after dialysis     Discussed patient condition and risk of morbidity and/or mortality with Family, RN, RT, Pharmacy, Charge nurse / hot rounds and cardiology and nephrology.      The patient remains critically ill.  Critical care time = 32 minutes in directly providing and coordinating critical care and extensive data review.  No time overlap and excludes procedures.     Martita VALDEZ (Scribe), am scribing for, and in the presence of, Carlos Manuel Man M.D.    Electronically signed by: Martita Sexton ([place]), 5/31/2017    Carlos Manuel VALDEZ M.D. personally performed the services described in this documentation, as scribed by Martita Sexton in my presence, and it is both accurate and complete.

## 2017-05-31 NOTE — PROGRESS NOTES
Hemodialysis ordered by Dr. Petersen. Treatment started at 1804 and ended at 2104. Hypotensive during this tx. Albumin IVBP given, provided good result. Dr. Petersen notified and aware. Pt stable, vss, no distress post tx. See flow sheets for details. Net UF 1.55 liters. Report to EDVIN Mcelroy RN. LEXI Contreras verified RT UA AVF dressing clean, dry, intact.

## 2017-05-31 NOTE — PROGRESS NOTES
Hospital Medicine Interval Note  Date of Service:  5/31/2017    Chief Complaint  66 y.o.-year-old female admitted 5/27/2017 with cardiac arrest.    Interval Problem Update  Following command when weaned off sedation, paroxysmal afib rate controlled    Consultants/Specialty  Pulmonology  Cardiology  Nephrology      Disposition  ICU.     Review of Systems   Unable to perform ROS: intubated      Physical Exam Laboratory/Imaging   Filed Vitals:    05/31/17 1400 05/31/17 1500 05/31/17 1540 05/31/17 1542   BP:       Pulse: 60 64     Temp:       Resp: 21 27     Height:       Weight:       SpO2:   97% 98%     Physical Exam   Constitutional: She appears well-developed.   HENT:   Head: Normocephalic and atraumatic.   Eyes: Right eye exhibits no discharge. Left eye exhibits no discharge.   Neck: No JVD present. No tracheal deviation present.   Cardiovascular: Normal rate and regular rhythm.  Exam reveals no friction rub.    No murmur heard.  Pulmonary/Chest: No respiratory distress. She has no wheezes. She has rhonchi. She has rales. She exhibits no tenderness.   intubated   Abdominal: Soft. Bowel sounds are normal. She exhibits no distension. There is no tenderness. There is no rebound.   Musculoskeletal: She exhibits edema. She exhibits no tenderness.   Neurological:   Sedated no focal deficits noted   Skin: Skin is warm and dry. She is not diaphoretic. No cyanosis. Nails show no clubbing.   Psychiatric:   sedated    Lab Results   Component Value Date/Time    WBC 7.2 05/31/2017 04:15 AM    HEMOGLOBIN 7.7* 05/31/2017 04:15 AM    HEMATOCRIT 24.3* 05/31/2017 04:15 AM    PLATELET COUNT 111* 05/31/2017 04:15 AM     Lab Results   Component Value Date/Time    SODIUM 134* 05/31/2017 04:15 AM    POTASSIUM 4.2 05/31/2017 04:15 AM    CHLORIDE 97 05/31/2017 04:15 AM    CO2 30 05/31/2017 04:15 AM    GLUCOSE 156* 05/31/2017 04:15 AM    BUN 25* 05/31/2017 04:15 AM    CREATININE 2.74* 05/31/2017 04:15 AM      Assessment/Plan    Cardiac  arrest (CMS-HCC) (present on admission)  Assessment & Plan  With return of spontaneous circulation.   Will need angiogram post extubation cardiology following    ESRD (end stage renal disease) (CMS-HCC) (present on admission)  Assessment & Plan  HD MWF per nephrology    Acute respiratory failure with hypoxia (CMS-HCC) (present on admission)  Assessment & Plan  Vent management per CC discussed with Dr kumar    Atrial fibrillation (CMS-HCC) (present on admission)  Assessment & Plan  Paroxysmal, continue amiodarone coreg heparin drip  Cardiology following plan is for cath when extubated    Anemia (present on admission)  Assessment & Plan  Likely chronic renal disease  Hg 7.7 no signs of active bleed    IDDM (insulin dependent diabetes mellitus) (CMS-HCC) (present on admission)  Assessment & Plan  ISS monitor cbg's  And adjust    Dyslipidemia (present on admission)  Assessment & Plan  lipitor    Cardiomyopathy (CMS-HCC) (present on admission)  Assessment & Plan    EF35%    Continue medical management and fluid management with HD  Decrease carvedilol given marginal BP, hold off  ACEI given low BP      Aspiration pneumonia (CMS-HCC) (present on admission)  Assessment & Plan  Cx MRSA  On zyvox day 2/7  continue ceft flagyl for aspiration Pna coverage 5 day course      Plan of care reviewed with patient's daughter and son at bedside  and discussed with nursing staff  Labs reviewed and Medications reviewed  Patricia catheter: Unconscious / Sedated Patient on a Ventilator  Central line in place: Need for access    DVT Prophylaxis: Heparin      Antibiotics: Treating active infection/contamination beyond 24 hours perioperative coverage

## 2017-05-31 NOTE — PROGRESS NOTES
Nephrologist ordered 3 hours  hd tx. Initiated at 0758 and completed at 1100. Net uf 2050 ml. Tolerated HD with Albumin  Support.See flow sheet for details.  Jesus ELLIOTT.

## 2017-05-31 NOTE — PROGRESS NOTES
Cardiology brief progress note    Patient still intubated.   No recurrent A.fib. On oral amiodarone load. On heparin gtt    Will peripherally follow.   Plan for cardiac cath when pt extubated.     Mulu Bryant MD  Cardiologist  [facility]

## 2017-05-31 NOTE — PROGRESS NOTES
Hospital Medicine Interval Note  Date of Service:  5/30/2017    Chief Complaint  66 y.o.-year-old female admitted 5/27/2017 with cardiac arrest.    Interval Problem Update  Sputum cx MRSA paroxysmal AFIB, CXR worse  Consultants/Specialty  Pulmonology  Cardiology  Nephrology      Disposition  ICU.     Review of Systems   Unable to perform ROS: intubated      Physical Exam Laboratory/Imaging   Filed Vitals:    05/30/17 1435 05/30/17 1500 05/30/17 1600 05/30/17 1700   BP:       Pulse:  57 57 56   Temp:       Resp:  17 16 16   Height:       Weight:       SpO2: 96% 96% 97% 98%     Physical Exam   Constitutional: She appears well-developed.   HENT:   Head: Normocephalic and atraumatic.   Right Ear: External ear normal.   Left Ear: External ear normal.   Eyes: Conjunctivae are normal. Right eye exhibits no discharge. Left eye exhibits no discharge.   Neck: No tracheal deviation present.   Cardiovascular: Normal rate and regular rhythm.  Exam reveals no friction rub.    No murmur heard.  Pulmonary/Chest: No stridor. She has rhonchi. She has rales. She exhibits no tenderness.   intubated   Abdominal: Soft. Bowel sounds are normal. She exhibits no distension. There is no tenderness. There is no rebound.   Musculoskeletal: She exhibits edema. She exhibits no tenderness.   Neurological:   Sedated no focal deficits noted   Skin: Skin is warm. She is not diaphoretic. There is pallor.   Psychiatric:   sedated    Lab Results   Component Value Date/Time    WBC 10.2 05/30/2017 04:10 AM    HEMOGLOBIN 8.2* 05/30/2017 04:10 AM    HEMATOCRIT 25.8* 05/30/2017 04:10 AM    PLATELET COUNT 107* 05/30/2017 04:10 AM     Lab Results   Component Value Date/Time    SODIUM 137 05/30/2017 04:10 AM    POTASSIUM 3.6 05/30/2017 04:10 AM    CHLORIDE 98 05/30/2017 04:10 AM    CO2 29 05/30/2017 04:10 AM    GLUCOSE 140* 05/30/2017 04:10 AM    BUN 24* 05/30/2017 04:10 AM    CREATININE 3.15* 05/30/2017 04:10 AM      Assessment/Plan    Cardiac arrest  (CMS-HCC) (present on admission)  Assessment & Plan  With return of spontaneous circulation.   Will need angiogram post extubation cardiology following    ESRD (end stage renal disease) (CMS-HCC) (present on admission)  Assessment & Plan  Discussed with Dr almonte HD today    Acute respiratory failure with hypoxia (CMS-HCC) (present on admission)  Assessment & Plan  Vent management per CC discussed with Dr kumar    Atrial fibrillation (CMS-HCC) (present on admission)  Assessment & Plan  Paroxysmal, continue amiodarone coreg heparin drip  Cardiology following    Anemia (present on admission)  Assessment & Plan  Likely chronic renal disease  Hg stable monitor    IDDM (insulin dependent diabetes mellitus) (CMS-HCC) (present on admission)  Assessment & Plan  ISS monitor cbg's was on lantus at home    Dyslipidemia (present on admission)  Assessment & Plan  lipitor    Cardiomyopathy (CMS-HCC) (present on admission)  Assessment & Plan    EF35%    Continue medical management and fluid management with HD  Consider adding ACEI if BP tolerates      Aspiration pneumonia (CMS-HCC) (present on admission)  Assessment & Plan  Cx MRSA  Start zyvox continue ceft flagyl for aspiration Pna coverage       Labs reviewed and Medications reviewed  Patricia catheter: Unconscious / Sedated Patient on a Ventilator  Central line in place: Need for access    DVT Prophylaxis: Heparin      Antibiotics: Treating active infection/contamination beyond 24 hours perioperative coverage

## 2017-05-31 NOTE — CARE PLAN
Adult Ventilation Update    Total Vent Days: 5    Patient Lines/Drains/Airways Status    Active Airway     Name: Placement date: Placement time: Site: Days:    Airway Group ET Tube Oral 7.5 05/27/17    Oral  4              In the last 24 hours, the patient tolerated SBT for a minimum of  (20 min) hours on settings of 8 over 8.          Rapid Shallow Breathing Index (RR/VT): 119 (05/30/17 0654)  Plateau Pressure (Q Shift): 20 (05/30/17 1859)  Static Compliance (ml / cm H2O): 30.3 (05/31/17 0218)    Patient failed trials because of Barriers to Wean: Other (Comments) (pt going to CT) (05/29/17 1507)  Barriers to SBT Weaning Trial Stopped due to:: RSBI >105 (Rapid Shallow Breathing Index) (05/30/17 0654)  Length of Weaning Trial Length of Weaning Trial (Hours):  (20 min) (05/30/17 0654)        Sputum/Suction   Cough: Productive (05/31/17 0218)  Sputum Amount: Small (05/31/17 0218)  Sputum Color: Clear;Tan (05/31/17 0218)  Sputum Consistency: Thick;Thin (05/31/17 0218)    Mobility Group  Activity Performed: Edge of bed (05/30/17 2000)  Time Activity Tolerated: 5 min (05/30/17 2000)  Distance Per Occurrence (ft.): 0 feet (05/30/17 1200)  # of Times Distance was Traveled: 0 (05/30/17 1200)  Assistance / Tolerance: Assistance of Two or More;Tolerates Well;Tires quickly (05/30/17 2000)  Pt Calls for Assistance: No (05/30/17 2000)  Staff Present for Mobilization: RN (05/30/17 2000)  Assistive Devices: Hand held assist (05/31/17 0400)  Reason Not Mobilized: Other (comment) (will reassess with day RN) (05/30/17 0626)  Mobilization Comments: Per Individualized order by STEVE Millan MD that was verified by SEVERINO Chinchilla RN (05/27/17 2200)    Events/Summary/Plan: vent check (05/30/17 6937)

## 2017-05-31 NOTE — CARE PLAN
Problem: Bowel/Gastric:  Goal: Normal bowel function is maintained or improved  Patient last BM PTA, mirilax and senna given.    Problem: Mobility  Goal: Risk for activity intolerance will decrease  Mobilized to edge of bed for 7 min. 2 RN assist, patient tolerated well and moved extremities but tired quickly. VSS.

## 2017-06-01 ENCOUNTER — APPOINTMENT (OUTPATIENT)
Dept: RADIOLOGY | Facility: MEDICAL CENTER | Age: 67
DRG: 264 | End: 2017-06-01
Attending: INTERNAL MEDICINE
Payer: MEDICARE

## 2017-06-01 LAB
ANION GAP SERPL CALC-SCNC: 11 MMOL/L (ref 0–11.9)
APTT PPP: 33 SEC (ref 24.7–36)
BASE EXCESS BLDA CALC-SCNC: 9 MMOL/L (ref -4–3)
BASOPHILS # BLD AUTO: 0.3 % (ref 0–1.8)
BASOPHILS # BLD: 0.02 K/UL (ref 0–0.12)
BODY TEMPERATURE: ABNORMAL DEGREES
BUN SERPL-MCNC: 38 MG/DL (ref 8–22)
CALCIUM SERPL-MCNC: 9 MG/DL (ref 8.5–10.5)
CHLORIDE SERPL-SCNC: 93 MMOL/L (ref 96–112)
CO2 BLDA-SCNC: 35 MMOL/L (ref 20–33)
CO2 SERPL-SCNC: 31 MMOL/L (ref 20–33)
CREAT SERPL-MCNC: 2.76 MG/DL (ref 0.5–1.4)
EOSINOPHIL # BLD AUTO: 0.16 K/UL (ref 0–0.51)
EOSINOPHIL NFR BLD: 2.7 % (ref 0–6.9)
ERYTHROCYTE [DISTWIDTH] IN BLOOD BY AUTOMATED COUNT: 55.8 FL (ref 35.9–50)
GFR SERPL CREATININE-BSD FRML MDRD: 17 ML/MIN/1.73 M 2
GLUCOSE BLD-MCNC: 156 MG/DL (ref 65–99)
GLUCOSE BLD-MCNC: 156 MG/DL (ref 65–99)
GLUCOSE BLD-MCNC: 171 MG/DL (ref 65–99)
GLUCOSE BLD-MCNC: 175 MG/DL (ref 65–99)
GLUCOSE BLD-MCNC: 198 MG/DL (ref 65–99)
GLUCOSE SERPL-MCNC: 148 MG/DL (ref 65–99)
HCO3 BLDA-SCNC: 33.3 MMOL/L (ref 17–25)
HCT VFR BLD AUTO: 24.8 % (ref 37–47)
HGB BLD-MCNC: 7.9 G/DL (ref 12–16)
IMM GRANULOCYTES # BLD AUTO: 0.13 K/UL (ref 0–0.11)
IMM GRANULOCYTES NFR BLD AUTO: 2.2 % (ref 0–0.9)
LYMPHOCYTES # BLD AUTO: 1.24 K/UL (ref 1–4.8)
LYMPHOCYTES NFR BLD: 20.9 % (ref 22–41)
MCH RBC QN AUTO: 27.2 PG (ref 27–33)
MCHC RBC AUTO-ENTMCNC: 31.9 G/DL (ref 33.6–35)
MCV RBC AUTO: 85.5 FL (ref 81.4–97.8)
MONOCYTES # BLD AUTO: 0.67 K/UL (ref 0–0.85)
MONOCYTES NFR BLD AUTO: 11.3 % (ref 0–13.4)
NEUTROPHILS # BLD AUTO: 3.72 K/UL (ref 2–7.15)
NEUTROPHILS NFR BLD: 62.6 % (ref 44–72)
NRBC # BLD AUTO: 0 K/UL
NRBC BLD AUTO-RTO: 0 /100 WBC
O2/TOTAL GAS SETTING VFR VENT: 45 %
PCO2 BLDA: 43.6 MMHG (ref 26–37)
PCO2 TEMP ADJ BLDA: 44.6 MMHG (ref 26–37)
PH BLDA: 7.49 [PH] (ref 7.4–7.5)
PH TEMP ADJ BLDA: 7.48 [PH] (ref 7.4–7.5)
PLATELET # BLD AUTO: 136 K/UL (ref 164–446)
PMV BLD AUTO: 10.5 FL (ref 9–12.9)
PO2 BLDA: 77 MMHG (ref 64–87)
PO2 TEMP ADJ BLDA: 79 MMHG (ref 64–87)
POTASSIUM SERPL-SCNC: 3.9 MMOL/L (ref 3.6–5.5)
RBC # BLD AUTO: 2.9 M/UL (ref 4.2–5.4)
SAO2 % BLDA: 96 % (ref 93–99)
SODIUM SERPL-SCNC: 135 MMOL/L (ref 135–145)
SPECIMEN DRAWN FROM PATIENT: ABNORMAL
TRIGL SERPL-MCNC: 181 MG/DL (ref 0–149)
WBC # BLD AUTO: 5.9 K/UL (ref 4.8–10.8)

## 2017-06-01 PROCEDURE — P9047 ALBUMIN (HUMAN), 25%, 50ML: HCPCS | Performed by: INTERNAL MEDICINE

## 2017-06-01 PROCEDURE — 700111 HCHG RX REV CODE 636 W/ 250 OVERRIDE (IP): Performed by: INTERNAL MEDICINE

## 2017-06-01 PROCEDURE — 36600 WITHDRAWAL OF ARTERIAL BLOOD: CPT

## 2017-06-01 PROCEDURE — 99291 CRITICAL CARE FIRST HOUR: CPT | Performed by: INTERNAL MEDICINE

## 2017-06-01 PROCEDURE — 700102 HCHG RX REV CODE 250 W/ 637 OVERRIDE(OP): Performed by: INTERNAL MEDICINE

## 2017-06-01 PROCEDURE — 82803 BLOOD GASES ANY COMBINATION: CPT

## 2017-06-01 PROCEDURE — 80048 BASIC METABOLIC PNL TOTAL CA: CPT

## 2017-06-01 PROCEDURE — A9270 NON-COVERED ITEM OR SERVICE: HCPCS | Performed by: INTERNAL MEDICINE

## 2017-06-01 PROCEDURE — 700102 HCHG RX REV CODE 250 W/ 637 OVERRIDE(OP): Performed by: HOSPITALIST

## 2017-06-01 PROCEDURE — 90935 HEMODIALYSIS ONE EVALUATION: CPT

## 2017-06-01 PROCEDURE — 71010 DX-CHEST-PORTABLE (1 VIEW): CPT

## 2017-06-01 PROCEDURE — 82962 GLUCOSE BLOOD TEST: CPT

## 2017-06-01 PROCEDURE — 84478 ASSAY OF TRIGLYCERIDES: CPT

## 2017-06-01 PROCEDURE — 700105 HCHG RX REV CODE 258: Performed by: INTERNAL MEDICINE

## 2017-06-01 PROCEDURE — 85730 THROMBOPLASTIN TIME PARTIAL: CPT

## 2017-06-01 PROCEDURE — 94150 VITAL CAPACITY TEST: CPT

## 2017-06-01 PROCEDURE — 94003 VENT MGMT INPAT SUBQ DAY: CPT

## 2017-06-01 PROCEDURE — 770022 HCHG ROOM/CARE - ICU (200)

## 2017-06-01 PROCEDURE — 85025 COMPLETE CBC W/AUTO DIFF WBC: CPT

## 2017-06-01 PROCEDURE — 99233 SBSQ HOSP IP/OBS HIGH 50: CPT | Performed by: HOSPITALIST

## 2017-06-01 RX ADMIN — METRONIDAZOLE 500 MG: 500 TABLET ORAL at 05:15

## 2017-06-01 RX ADMIN — FENTANYL CITRATE 25 MCG: 50 INJECTION, SOLUTION INTRAMUSCULAR; INTRAVENOUS at 20:13

## 2017-06-01 RX ADMIN — LINEZOLID 600 MG: 600 TABLET, FILM COATED ORAL at 20:20

## 2017-06-01 RX ADMIN — INSULIN LISPRO 3 UNITS: 100 INJECTION, SOLUTION INTRAVENOUS; SUBCUTANEOUS at 23:26

## 2017-06-01 RX ADMIN — CEFTRIAXONE SODIUM 1 G: 1 INJECTION, POWDER, FOR SOLUTION INTRAMUSCULAR; INTRAVENOUS at 08:29

## 2017-06-01 RX ADMIN — FAMOTIDINE 20 MG: 20 TABLET, FILM COATED ORAL at 08:30

## 2017-06-01 RX ADMIN — ATORVASTATIN CALCIUM 40 MG: 40 TABLET, FILM COATED ORAL at 20:20

## 2017-06-01 RX ADMIN — AMIODARONE HYDROCHLORIDE 400 MG: 200 TABLET ORAL at 08:29

## 2017-06-01 RX ADMIN — CHLORHEXIDINE GLUCONATE 15 ML: 1.2 RINSE ORAL at 08:29

## 2017-06-01 RX ADMIN — ASPIRIN 81 MG: 81 TABLET ORAL at 08:30

## 2017-06-01 RX ADMIN — CARVEDILOL 6.25 MG: 6.25 TABLET, FILM COATED ORAL at 17:08

## 2017-06-01 RX ADMIN — LINEZOLID 600 MG: 600 TABLET, FILM COATED ORAL at 08:30

## 2017-06-01 RX ADMIN — METRONIDAZOLE 500 MG: 500 TABLET ORAL at 23:22

## 2017-06-01 RX ADMIN — CARVEDILOL 6.25 MG: 6.25 TABLET, FILM COATED ORAL at 05:30

## 2017-06-01 RX ADMIN — STANDARDIZED SENNA CONCENTRATE AND DOCUSATE SODIUM 2 TABLET: 8.6; 5 TABLET, FILM COATED ORAL at 08:29

## 2017-06-01 RX ADMIN — INSULIN LISPRO 3 UNITS: 100 INJECTION, SOLUTION INTRAVENOUS; SUBCUTANEOUS at 11:44

## 2017-06-01 RX ADMIN — INSULIN LISPRO 3 UNITS: 100 INJECTION, SOLUTION INTRAVENOUS; SUBCUTANEOUS at 17:09

## 2017-06-01 RX ADMIN — PROPOFOL 20 MCG/KG/MIN: 10 INJECTION, EMULSION INTRAVENOUS at 04:30

## 2017-06-01 RX ADMIN — METRONIDAZOLE 500 MG: 500 TABLET ORAL at 14:41

## 2017-06-01 RX ADMIN — CHLORHEXIDINE GLUCONATE 15 ML: 1.2 RINSE ORAL at 20:20

## 2017-06-01 RX ADMIN — STANDARDIZED SENNA CONCENTRATE AND DOCUSATE SODIUM 2 TABLET: 8.6; 5 TABLET, FILM COATED ORAL at 20:20

## 2017-06-01 RX ADMIN — Medication 12.5 G: at 12:15

## 2017-06-01 RX ADMIN — INSULIN LISPRO 3 UNITS: 100 INJECTION, SOLUTION INTRAVENOUS; SUBCUTANEOUS at 05:15

## 2017-06-01 RX ADMIN — AMIODARONE HYDROCHLORIDE 400 MG: 200 TABLET ORAL at 20:19

## 2017-06-01 RX ADMIN — PROPOFOL 20 MCG/KG/MIN: 10 INJECTION, EMULSION INTRAVENOUS at 16:50

## 2017-06-01 RX ADMIN — INSULIN HUMAN 5 UNITS: 100 INJECTION, SUSPENSION SUBCUTANEOUS at 20:18

## 2017-06-01 RX ADMIN — Medication 12.5 G: at 13:00

## 2017-06-01 ASSESSMENT — PULMONARY FUNCTION TESTS: FVC: .82

## 2017-06-01 NOTE — CARE PLAN
Problem: Ventilation Defect:  Goal: Ability to achieve and maintain unassisted ventilation or tolerate decreased levels of ventilator support  Outcome: PROGRESSING AS EXPECTED    Problem: Oxygenation:  Goal: Maintain adequate oxygenation dependent on patient condition  Outcome: PROGRESSING SLOWER THAN EXPECTED  Adult Ventilation Update    Total Vent Days: 6      Patient Lines/Drains/Airways Status    Active Airway      Name: Placement date: Placement time: Site: Days:     Airway Group ET Tube Oral 7.5 05/27/17    Oral  6                   Events/Summary/Plan: PT SBT'ed 1 HR and failed to to RSBI 142    No vent changes made at this time, will continue to monitor

## 2017-06-01 NOTE — CARE PLAN
Problem: Ventilation Defect:  Goal: Ability to achieve and maintain unassisted ventilation or tolerate decreased levels of ventilator support  Outcome: PROGRESSING AS EXPECTED  Adult Ventilation Update    Total Vent Days: 5      Patient Lines/Drains/Airways Status    Active Airway      Name: Placement date: Placement time: Site: Days:     Airway Group ET Tube Oral 7.5 05/27/17    Oral                    In the last 24 hours, the patient tolerated SBT for 1hr on settings of 5/8.    #FVC / Vital Capacity (liters) : 0.45 (05/31/17 1540)  NIF (cm H2O) : -12 (05/31/17 1540)  Rapid Shallow Breathing Index (RR/VT): 80 (05/31/17 1540)  Plateau Pressure (Q Shift): 18 (05/31/17 1158)  Static Compliance (ml / cm H2O): 37.5 (05/31/17 1542)    Patient failed trials because of Barriers to Wean:  (Dialysis) (05/31/17 0708)  Barriers to SBT Weaning Trial Stopped due to:: Pt weaned for 1 hour and returned to rest settings per protocol (05/31/17 1542)  Length of Weaning Trial Length of Weaning Trial (Hours): 1 (05/31/17 1542)     Sputum/Suction   Cough: Productive (05/31/17 1158)  Sputum Amount: Small (05/31/17 1158)  Sputum Color: White;Clear;Tan (05/31/17 1158)  Sputum Consistency: Thick;Thin (05/31/17 1158)    Events/Summary/Plan: Pt return to rest settings. Plan to SBT tomorrow if stable per Dr. Man. No other changes made. Will continue to monitor.

## 2017-06-01 NOTE — CARE PLAN
Problem: Ventilation Defect:  Goal: Ability to achieve and maintain unassisted ventilation or tolerate decreased levels of ventilator support  Intervention: Support and monitor invasive and noninvasive mechanical ventilation  Adult Ventilation Update    Total Vent Days: 6      Patient Lines/Drains/Airways Status    Active Airway      Name: Placement date: Placement time: Site: Days:     Airway Group ET Tube Oral 7.5 05/27/17    Oral  5                 In the last 24 hours, the patient tolerated SBT for 1 hour on settings of 5/8.      Plateau Pressure (Q Shift): 14 (06/01/17 0334)  Static Compliance (ml / cm H2O): 43.6 (06/01/17 0334)    Pt remains on vent with no changes overnight. Thick tenacious bloody secretions at times clogging the gutierrez in-line suction.

## 2017-06-01 NOTE — PROGRESS NOTES
Patient desaturated into high 80s, thick bloody secretions were suctioned out of ET tube multiple times, STAT CXR ordered, patient now saturating in 90s. Dr Gonda updated and orders received to stop heparin until 0700.

## 2017-06-01 NOTE — PROGRESS NOTES
Cardiology brief progress note    Patient still intubated.   Tele reviewed. Still having paroxymal A.fib, rate controlled.   Continue coreg at current dose and oral amiodarone load.   On heparin gtt    Will peripherally follow.   Plan for cardiac cath when pt extubated.     Mulu Bryant MD  Cardiologist  [facility]

## 2017-06-01 NOTE — PROGRESS NOTES
Pulmonary Critical Care Progress Note      Date of service: 6/1/2017    Chief Complaint: Cardiac Arrest    History of Present Illness: Patient is a 65 y/o F, history of end stage renal disease, Right AV fistula, chronic HD, DM, admitted through the ED on 5/27/2017 after out of hospital cardiac arrest asystole with ROSC after two rounds of epi. Intubated remains on full ventilator support but now awake and following commands in Romansh.     ROS:    Respiratory: unable to perform due to the patient's inability to effectively communicate,   Cardiac: unable to perform due to the patient's inability to effectively communicate,   GI: unable to perform due to the patient's inability to effectively communicate.      Interval Events:  24 hour interval history reviewed   Follows and moves all extremities.   A fib to SR/SB, BP noted   TF at 40, 250 cc UOP   Tmax 37.5   Last night had robert red blood in sputum with suction.   Heparin placed on hold with no blood noted with suctioning since.   SBT this AM for an hour with RSBI 120-160. Poor parameters.   CXR shows: unchanged bilateral lower lobe infiltrate and left effusion  Day 5 ceftriaxone and Day 3 Zyvox for MRSA  Bcx negative and WBC improved.        PFSH:  No change.    Physical Exam  General: Sedate on propofol.   Neuro/Psych: Arouses, follows commands with family at bedside in Romansh. Moving all extremities symmetrically. PERRL. Cortrak in place. ET tube in place. Neck supple. Trachea midline. Triple lumed site CDI.   HEENT: Right nare coretrak, Supple, midline, no crepitus. Right IJ CVC CDI  CVS: Distant, regular.   Respiratory: Moderately diminished, clear. No wheezing.   Abdomen/: Mildly distended. Soft. NT. Tolerating tube feed. Bowel sounds present.   Extremities: Distal pulses 2+ bilateral.   Skin: Cool, dry, perfused. Capillary refill intact.       Respiratory:  Ordaz Vent Mode: APVCMV, Rate (breaths/min): 16, Vt Target (mL): 320, PEEP/CPAP: 8, FiO2: 45, P  Support: 5, Static Compliance (ml / cm H2O): 35.1, Control VTE (exp VT): 320  Pulse Oximetry: 97 %  ImagingAvailable data reviewed   Recent Labs      05/30/17   0509  05/31/17   0538  06/01/17   0450   ISTATAPH  7.489  7.475  7.491   ISTATAPCO2  41.2*  45.0*  43.6*   ISTATAPO2  58*  70  77   ISTATATCO2  33  34*  35*   [id number]  92*  95  96   ISTATARTHCO3  31.3*  33.1*  33.3*   ISTATARTBE  7*  8*  9*   ISTATTEMP  37.8 C  37.2 C  37.5 C   ISTATFIO2  45  45  45   ISTATSPEC  Arterial  Arterial  Arterial   ISTATAPHTC  7.477  7.472  7.484   LCKLUFEK2IL  62*  71  79     HemoDynamics:  Pulse: 67, Heart Rate (Monitored): 72  NIBP: 128/50 mmHg    Imaging: Available data reviewed     ECHO 5/27:  CONCLUSIONS  No prior study is available for comparison.   Normal left ventricular chamber size. Normal left ventricular wall thickness. Moderately reduced left ventricular systolic function. Left ventricular ejection fraction is visually estimated to be 35%. Normal   regional wall motion.   Grade II diastolic dysfunction.  Moderately dilated left atrium.  Moderate mitral regurgitation.  Estimated right ventricular systolic pressure is at least 45 mmHg.  IVC not visualized.        Neuro:  GCS Total [place] Coma Score: 11  Imaging: Available data reviewed    Fluids:  Intake/Output       05/30/17 0700 - 05/31/17 0659 05/31/17 0700 - 06/01/17 0659 06/01/17 0700 - 06/02/17 0659      8973-9883 1331-3144 Total 8105-9821 7634-8500 Total 0059-5149 2002-7706 Total       Intake    I.V.  615.9  480.8 1096.7  409.5  254.4 663.9  --  -- --    Propofol Volume 114.5 130.8 245.3 129.5 104.4 233.9 -- -- --    Heparin Volume 181.4 180 361.4 180 90 270 -- -- --    IV Volume 120 20 140 -- 60 60 -- -- --     150 350 100 -- 100 -- -- --    Other  120  90 210  --  90 90  --  -- --    Medications (P.O./ Enteral Liquids) 120 90 210 -- 90 90 -- -- --    Dialysis  --  500 500  650  -- 650  --  -- --    Dialysis Volume (Dialysis Intake) -- 500 500 650  -- 650 -- -- --    Enteral  540  570 1110  80  570 650  --  -- --    Enteral Volume 480 480 960 80 480 560 -- -- --    Free Water / Tube Flush 60 90 150 -- 90 90 -- -- --    Total Intake 1275.9 1640.8 2916.7 1139.5 914.4 2053.9 -- -- --       Output    Urine  300  150 450  125  260 385  --  -- --    Indwelling Cathether 300 150 450 125 260 385 -- -- --    Dialysis  --  2200 2200  2700  -- 2700  --  -- --    Dialysis Output (Dialysis Output) -- 2200 2200 2700 -- 2700 -- -- --    Stool  --  -- --  --  -- --  --  -- --    Number of Times Stooled -- -- -- -- 1 x 1 x -- -- --    Total Output 300 2350 2650 2825 260 3085 -- -- --       Net I/O     975.9 -709.2 266.7 -1685.5 654.4 -1031.1 -- -- --        Weight: 71.9 kg (158 lb 8.2 oz)  Recent Labs      17   0330   SODIUM  137  134*  135   POTASSIUM  3.6  4.2  3.9   CHLORIDE  98  97  93*   CO2    31   BUN  24*  25*  38*   CREATININE  3.15*  2.74*  2.76*   MAGNESIUM  1.9   --    --    PHOSPHORUS  3.0   --    --    CALCIUM  8.4*  9.1  9.0     GI/Nutrition:  Imaging: Available data reviewed  NPO and tube feed Tolerated at 40.     Liver Function:  Recent Labs      17   0410  17   0330   GLUCOSE  140*  156*  148*     Heme:  Recent Labs      17   1140   17   0410   17   1955  17   0150  17   0330   RBC   --    --   3.00*   --    --    --   2.85*  2.90*   HEMOGLOBIN   --    --   8.2*   --    --    --   7.7*  7.9*   HEMATOCRIT   --    --   25.8*   --    --    --   24.3*  24.8*   PLATELETCT   --    --   107*   --    --    --   111*  136*   PROTHROMBTM  16.4*   --    --    --    --    --    --    --    APTT  36.6*   < >   --    < >  81.1*  71.0*   --   33.0   INR  1.28*   --    --    --    --    --    --    --     < > = values in this interval not displayed.     Infectious Disease:  Temp  Av.5 °C (99.5 °F)  Min: 37.5 °C (99.5 °F)  Max: 37.5 °C (99.5  °F)  Monitored Temp  Av.4 °C (99.4 °F)  Min: 37.1 °C (98.8 °F)  Max: 37.7 °C (99.9 °F)  Micro: reviewed   Ceftriaxone and Flagyl.   ? Staph aureus pending.   Recent Labs      17   0410  17   0415  17   0330   WBC  10.2  7.2  5.9   NEUTSPOLYS  84.00*  79.30*  62.60   LYMPHOCYTES  9.50*  12.20*  20.90*   MONOCYTES  4.30  6.10  11.30   EOSINOPHILS  1.20  1.10  2.70   BASOPHILS  0.30  0.30  0.30     Current Facility-Administered Medications   Medication Dose Frequency Provider Last Rate Last Dose   • albumin human 25% solution 12.5 g  12.5 g Q HOUR PRN Ricardo Petersen M.D.   Stopped at 17 1046   • carvedilol (COREG) tablet 6.25 mg  6.25 mg BID WITH MEALS Carlos Manuel Man M.D.   6.25 mg at 17 0530   • insulin lispro (HUMALOG) injection 3-14 Units  3-14 Units Q6HRS Carlos Manuel Man M.D.   3 Units at 17 0515   • linezolid (ZYVOX) tablet 600 mg  600 mg Q12HRS Carlos Manuel Man M.D.   600 mg at 17   • NS (BOLUS) infusion 250 mL  250 mL DIALYSIS PRN Ricardo Petersen M.D.       • epoetin micheal (EPOGEN,PROCRIT) injection 10,000 Units  10,000 Units MO, WE + FR Ricardo Petersen M.D.   10,000 Units at 17 0858   • NS (BOLUS) infusion 250 mL  250 mL DIALYSIS PRN Ricardo Petersen M.D.       • metronidazole (FLAGYL) tablet 500 mg  500 mg Q8HRS Carlos Manuel Man M.D.   500 mg at 1715   • heparin 1000 units/mL injection 2,600 Units  2,600 Units PRN Ricardo Castaneda M.D.        And   • heparin infusion 25,000 units in 500 ml 0.45% nacl   Continuous Ricardo Castaneda M.D.   Stopped at 17 2249   • aspirin EC (ECOTRIN) tablet 81 mg  81 mg DAILY Mulu Bryant M.D.   81 mg at 17 0814   • atorvastatin (LIPITOR) tablet 40 mg  40 mg QHS Mulu Bryant M.D.   40 mg at 17   • amiodarone (CORDARONE) tablet 400 mg  400 mg TWICE DAILY Mulu Bryant M.D.   400 mg at 17   • [START ON 2017] amiodarone (CORDARONE) tablet 200 mg  200 mg TWICE DAILY Mulu Bryant  M.D.       • [START ON 6/13/2017] amiodarone (CORDARONE) tablet 200 mg  200 mg Q DAY Mulu Bryant M.D.       • acetaminophen (TYLENOL) suppository 650 mg  650 mg Q6HRS PRN Dragan Martinez M.D.   650 mg at 05/28/17 0444   • cefTRIAXone (ROCEPHIN) 1 g in  mL IVPB  1 g Q24HRS Carlos Manuel Man M.D.   Stopped at 05/31/17 0843   • Respiratory Care per Protocol   Continuous RT Dragan Martinez M.D.       • senna-docusate (PERICOLACE or SENOKOT S) 8.6-50 MG per tablet 2 Tab  2 Tab BID Dragan Martinez M.D.   2 Tab at 05/31/17 2100    And   • polyethylene glycol/lytes (MIRALAX) PACKET 1 Packet  1 Packet QDAY PRN Dragan Martinez M.D.   1 Packet at 05/30/17 2121    And   • magnesium hydroxide (MILK OF MAGNESIA) suspension 30 mL  30 mL QDAY PRN Dragan Martinez M.D.        And   • bisacodyl (DULCOLAX) suppository 10 mg  10 mg QDAY PRN Dragan Martinez M.D.       • chlorhexidine (PERIDEX) 0.12 % solution 15 mL  15 mL BID Dragan Martinez M.D.   15 mL at 05/31/17 2029   • lidocaine (XYLOCAINE) 1%  injection  1-2 mL Q30 MIN PRN Dragan Martinez M.D.       • NS infusion   Continuous Dragan Martinez M.D. 10 mL/hr at 05/30/17 1945     • fentaNYL (SUBLIMAZE) injection 25 mcg  25 mcg Q HOUR PRN Dragan Martinez M.D.   25 mcg at 05/30/17 2144    Or   • fentaNYL (SUBLIMAZE) injection 50 mcg  50 mcg Q HOUR PRN Dragan Martinez M.D.   50 mcg at 05/31/17 2136    Or   • fentaNYL (SUBLIMAZE) injection 100 mcg  100 mcg Q HOUR PRN Dragan Martinez M.D.   100 mcg at 05/27/17 2101   • ipratropium-albuterol (DUONEB) nebulizer solution 3 mL  3 mL Q2HRS PRN (RT) Dragan Martinez M.D.       • famotidine (PEPCID) tablet 20 mg  20 mg DAILY Dragan Martinez M.D.   20 mg at 05/31/17 0814   • propofol (DIPRIVAN) injection  5-80 mcg/kg/min Continuous Dragan Martinez M.D. 8.7 mL/hr at 06/01/17 0430 20 mcg/kg/min at 06/01/17 0430   • enalaprilat (VASOTEC)  injection 1.25 mg  1.25 mg Q6HRS PRN ISABEL Richards.O.       • labetalol (NORMODYNE,TRANDATE) injection 10 mg  10 mg Q4HRS PRN ISABEL Richards.O.       • ondansetron (ZOFRAN) syringe/vial injection 4 mg  4 mg Q4HRS PRN GERARDO RichardsO.       • ondansetron (ZOFRAN ODT) dispertab 4 mg  4 mg Q4HRS PRN ISABEL Richards.O.       • acetaminophen (TYLENOL) tablet 650 mg  650 mg Q6HRS PRN ISABEL Richards.O.       • glucose 4 g chewable tablet 16 g  16 g Q15 MIN PRN GERARDO RichardsO.        And   • dextrose 50% (D50W) injection 25 mL  25 mL Q15 MIN PRN ISABEL Richards.O.         This patient's medications have not been reviewed.    Quality  Measures:  Labs reviewed, Medications reviewed and Radiology images reviewed  Patricia catheter: Critically Ill - Requiring Accurate Measurement of Urinary Output, Unconscious / Sedated Patient on a Ventilator and No Patricia      DVT Prophylaxis: Heparin  DVT prophylaxis - mechanical: SCDs  Ulcer prophylaxis: Yes    Assessed for rehab: Patient unable to tolerate rehabilitation therapeutic regimen    Assessment and Plan:  Ventilator-dependent respiratory failure.   - Intubated AM 5/27   - cxr improved after bronch   - start SBTs in AM   - RT protocols  Abnormal CXR - RLL ATX/pna   - f/u BAL  Status post witnessed out-of-hospital asystolic cardiac arrest.    - Cardiology following- primary  Cardiac arrest/NSTEMI?/valvular heart Dz/myop   - Cath after off vent; d/w'd cardiology today   - correct lytes  Cardiomyopathy - EF 35%   Grade II DD  PHTN - mild-moderate RVSP 45   Secondary to MR? Moderate by ECHO  Query anoxic brain injury.  CT scan of the head shows no acute pathology.     With sedation vacation patient following well despite language barrier  Anemia with thrombocytopenia.   Serial lab - transfuse PRN  End-stage renal disease, on maintenance hemodialysis.   Renal consult   HD today?   Hold BP meds for more effective UF - Bp ok   Diabetes mellitus - glycemic control  Enteral  nutrrition - Cortrak  ERP  Mobilize  Prophylaxis   Daily SBTs after dialysis     Bronchoscopy 6/1    Discussed patient condition and risk of morbidity and/or mortality with Family, RN, RT, Pharmacy, Charge nurse / hot rounds and cardiology and nephrology.      The patient remains critically ill.  Critical care time = 32 minutes in directly providing and coordinating critical care and extensive data review.  No time overlap and excludes procedures.    Diamond VALDEZ (Tad), am scribing for, and in the presence of, Carlos Manuel Man M.D.  Electronically signed by: Diamond Scott (Tad), 6/1/2017  Carlos Manuel VALDEZ M.D.personally performed the services described in this documentation, as scribed by Diamond Scott in my presence, and it is both accurate and complete.

## 2017-06-01 NOTE — PROGRESS NOTES
Nephrology Progress Note, Adult, Complex               Author: Ricardo Petersen   Date & Time created: 6/1/2017  3:49 PM   Interval History:  67 y/o female with ESRD/HD, s/p cardiac arrest  On HD MWF  On treatment today, using albumin Net UF 1.4L  Net negative balance yesterday    Review of Systems:  Review of Systems   Unable to perform ROS: intubated       Physical Exam:  Physical Exam   Constitutional: She appears well-developed and well-nourished. No distress. She is intubated.   HENT:   Head: Normocephalic and atraumatic.   ETT   Eyes: Pupils are equal, round, and reactive to light.   Cardiovascular: Normal rate and regular rhythm.  Exam reveals no gallop and no friction rub.    Pulmonary/Chest: She is intubated. She has no wheezes. She has no rales.   vented   Abdominal: Soft. Bowel sounds are normal. She exhibits no distension.   Musculoskeletal: She exhibits no edema.   Nursing note and vitals reviewed.      Labs:  Recent Labs      05/30/17   0509  05/31/17   0538  06/01/17   0450   ISTATAPH  7.489  7.475  7.491   ISTATAPCO2  41.2*  45.0*  43.6*   ISTATAPO2  58*  70  77   ISTATATCO2  33  34*  35*   [id number]  92*  95  96   ISTATARTHCO3  31.3*  33.1*  33.3*   ISTATARTBE  7*  8*  9*   ISTATTEMP  37.8 C  37.2 C  37.5 C   ISTATFIO2  45  45  45   ISTATSPEC  Arterial  Arterial  Arterial   ISTATAPHTC  7.477  7.472  7.484   [id number]  62*  71  79         Recent Labs      05/30/17   0410  05/31/17   0415  06/01/17   0330   SODIUM  137  134*  135   POTASSIUM  3.6  4.2  3.9   CHLORIDE  98  97  93*   CO2  29  30  31   BUN  24*  25*  38*   CREATININE  3.15*  2.74*  2.76*   MAGNESIUM  1.9   --    --    PHOSPHORUS  3.0   --    --    CALCIUM  8.4*  9.1  9.0     Recent Labs      05/30/17   0410  05/31/17   0415  06/01/17   0330   GLUCOSE  140*  156*  148*     Recent Labs      05/30/17   0410   05/30/17   1955  05/31/17   0150  05/31/17 0415 06/01/17   0330   RBC  3.00*   --    --    --   2.85*  2.90*   HEMOGLOBIN  8.2*    --    --    --   7.7*  7.9*   HEMATOCRIT  25.8*   --    --    --   24.3*  24.8*   PLATELETCT  107*   --    --    --   111*  136*   APTT   --    < >  81.1*  71.0*   --   33.0    < > = values in this interval not displayed.     Recent Labs      17   0410  17   0415  17   0330   WBC  10.2  7.2  5.9   NEUTSPOLYS  84.00*  79.30*  62.60   LYMPHOCYTES  9.50*  12.20*  20.90*   MONOCYTES  4.30  6.10  11.30   EOSINOPHILS  1.20  1.10  2.70   BASOPHILS  0.30  0.30  0.30           Hemodynamics:  Temp (24hrs), Av.5 °C (99.5 °F), Min:37.5 °C (99.5 °F), Max:37.5 °C (99.5 °F)  Temperature: 37.5 °C (99.5 °F), Monitored Temp: 37.1 °C (98.8 °F)  Pulse  Av.1  Min: 40  Max: 109Heart Rate (Monitored): (!) 59  NIBP: 133/41 mmHg     Respiratory:  Ordaz Vent Mode: APVCMV, Rate (breaths/min): 16, FiO2: 45, P Peak (PIP): 19, P MEAN: 11 Respiration: 18, Pulse Oximetry: 100 %     Work Of Breathing / Effort: Vented  RUL Breath Sounds: Diminished, RML Breath Sounds: Diminished, RLL Breath Sounds: Diminished, DEV Breath Sounds: Diminished, LLL Breath Sounds: Diminished  Fluids:    Intake/Output Summary (Last 24 hours) at 17 1549  Last data filed at 17 1435   Gross per 24 hour   Intake 2346.3 ml   Output   2435 ml   Net  -88.7 ml     Weight: 71.9 kg (158 lb 8.2 oz)  GI/Nutrition:  Orders Placed This Encounter   Procedures   • Diet NPO     Standing Status: Standing      Number of Occurrences: 1      Standing Expiration Date:      Order Specific Question:  Restrict to:     Answer:  With Tube Feed [4]     Medical Decision Making, by Problem:  Active Hospital Problems    Diagnosis   • Cardiac arrest (CMS-HCC) [I46.9]   • Atrial fibrillation (CMS-HCC) [I48.91]   • ESRD (end stage renal disease) (CMS-[facility]) [N18.6]   • Acute respiratory failure with hypoxia (CMS-[facility]) [J96.01]   • HTN (hypertension) [I10]   • IDDM (insulin dependent diabetes mellitus) (CMS-[facility]) [E11.9, Z79.4]   • Dyslipidemia [E78.5]   •  Cardiomyopathy (CMS-HCC) [I42.9]   • Anemia [D64.9]   • Elevated troponin [R74.8]       Medications reviewed and Labs reviewed                      Assessment and Plan  1.ESRD/HD -MWF, HD today, consider additional treatment tomorrow as needed   2.HTN: BP well controlled  3.Electrolytes: well controlled.  4.Anemia: Hgb 7.9, epogen 10k  5.S/p cardiac arrest  6.Volume:well controlled with UF/HD    HD MWF, additional tx as needed

## 2017-06-01 NOTE — PROGRESS NOTES
[facility] Progress Note    Hemodialysis treatment ordered today per Dr. Petersen x 3 hours. Treatment initiated at 1135, ended at 1435.     Patient bradycardic at times; see paper flow sheet for details.     Net UF 1450 mL, limited by BP. Albumin 25% 12.5g x 2 doses    Needles removed from access site. Dressings applied and sites held x 10 minutes; verified no bleeding. Positive bruit/thrill post tx. Staff RN to monitor AVF for breakthrough bleeding. Should breakthrough bleeding occur, staff RN to apply pressure to access sites until bleeding resolved. Notify Dialysis and Nephrologist for follow-up.    Report given to Primary RN.

## 2017-06-01 NOTE — PROGRESS NOTES
MD Gonda to bedside. Pt condition discussed, updated on lack of bloody sputum, received order to keep heparin drip off until day shift and to allow the day shift doctors to address the heparin drip.

## 2017-06-02 ENCOUNTER — APPOINTMENT (OUTPATIENT)
Dept: RADIOLOGY | Facility: MEDICAL CENTER | Age: 67
DRG: 264 | End: 2017-06-02
Attending: INTERNAL MEDICINE
Payer: MEDICARE

## 2017-06-02 LAB
ANION GAP SERPL CALC-SCNC: 10 MMOL/L (ref 0–11.9)
BACTERIA BLD CULT: NORMAL
BACTERIA BLD CULT: NORMAL
BASE EXCESS BLDA CALC-SCNC: 3 MMOL/L (ref -4–3)
BASOPHILS # BLD AUTO: 0.5 % (ref 0–1.8)
BASOPHILS # BLD: 0.04 K/UL (ref 0–0.12)
BODY TEMPERATURE: ABNORMAL DEGREES
BUN SERPL-MCNC: 40 MG/DL (ref 8–22)
CALCIUM SERPL-MCNC: 9.3 MG/DL (ref 8.5–10.5)
CHLORIDE SERPL-SCNC: 95 MMOL/L (ref 96–112)
CO2 BLDA-SCNC: 28 MMOL/L (ref 20–33)
CO2 SERPL-SCNC: 29 MMOL/L (ref 20–33)
CREAT SERPL-MCNC: 2.65 MG/DL (ref 0.5–1.4)
EOSINOPHIL # BLD AUTO: 0.14 K/UL (ref 0–0.51)
EOSINOPHIL NFR BLD: 1.9 % (ref 0–6.9)
ERYTHROCYTE [DISTWIDTH] IN BLOOD BY AUTOMATED COUNT: 56.3 FL (ref 35.9–50)
GFR SERPL CREATININE-BSD FRML MDRD: 18 ML/MIN/1.73 M 2
GLUCOSE BLD-MCNC: 165 MG/DL (ref 65–99)
GLUCOSE BLD-MCNC: 179 MG/DL (ref 65–99)
GLUCOSE BLD-MCNC: 189 MG/DL (ref 65–99)
GLUCOSE BLD-MCNC: 206 MG/DL (ref 65–99)
GLUCOSE BLD-MCNC: 210 MG/DL (ref 65–99)
GLUCOSE SERPL-MCNC: 204 MG/DL (ref 65–99)
HCO3 BLDA-SCNC: 26.6 MMOL/L (ref 17–25)
HCT VFR BLD AUTO: 26.2 % (ref 37–47)
HGB BLD-MCNC: 8.4 G/DL (ref 12–16)
IMM GRANULOCYTES # BLD AUTO: 0.21 K/UL (ref 0–0.11)
IMM GRANULOCYTES NFR BLD AUTO: 2.9 % (ref 0–0.9)
LYMPHOCYTES # BLD AUTO: 1.11 K/UL (ref 1–4.8)
LYMPHOCYTES NFR BLD: 15.2 % (ref 22–41)
MCH RBC QN AUTO: 27.5 PG (ref 27–33)
MCHC RBC AUTO-ENTMCNC: 32.1 G/DL (ref 33.6–35)
MCV RBC AUTO: 85.6 FL (ref 81.4–97.8)
MONOCYTES # BLD AUTO: 0.7 K/UL (ref 0–0.85)
MONOCYTES NFR BLD AUTO: 9.6 % (ref 0–13.4)
NEUTROPHILS # BLD AUTO: 5.09 K/UL (ref 2–7.15)
NEUTROPHILS NFR BLD: 69.9 % (ref 44–72)
NRBC # BLD AUTO: 0.03 K/UL
NRBC BLD AUTO-RTO: 0.4 /100 WBC
O2/TOTAL GAS SETTING VFR VENT: 45 %
PCO2 BLDA: 36.9 MMHG (ref 26–37)
PCO2 TEMP ADJ BLDA: 37.1 MMHG (ref 26–37)
PH BLDA: 7.47 [PH] (ref 7.4–7.5)
PH TEMP ADJ BLDA: 7.46 [PH] (ref 7.4–7.5)
PLATELET # BLD AUTO: 152 K/UL (ref 164–446)
PMV BLD AUTO: 10.3 FL (ref 9–12.9)
PO2 BLDA: 113 MMHG (ref 64–87)
PO2 TEMP ADJ BLDA: 113 MMHG (ref 64–87)
POTASSIUM SERPL-SCNC: 4.2 MMOL/L (ref 3.6–5.5)
RBC # BLD AUTO: 3.06 M/UL (ref 4.2–5.4)
SAO2 % BLDA: 99 % (ref 93–99)
SIGNIFICANT IND 70042: NORMAL
SIGNIFICANT IND 70042: NORMAL
SITE SITE: NORMAL
SITE SITE: NORMAL
SODIUM SERPL-SCNC: 134 MMOL/L (ref 135–145)
SOURCE SOURCE: NORMAL
SOURCE SOURCE: NORMAL
SPECIMEN DRAWN FROM PATIENT: ABNORMAL
WBC # BLD AUTO: 7.3 K/UL (ref 4.8–10.8)

## 2017-06-02 PROCEDURE — 36600 WITHDRAWAL OF ARTERIAL BLOOD: CPT

## 2017-06-02 PROCEDURE — 94150 VITAL CAPACITY TEST: CPT

## 2017-06-02 PROCEDURE — 700102 HCHG RX REV CODE 250 W/ 637 OVERRIDE(OP): Performed by: INTERNAL MEDICINE

## 2017-06-02 PROCEDURE — 71010 DX-CHEST-PORTABLE (1 VIEW): CPT

## 2017-06-02 PROCEDURE — 770022 HCHG ROOM/CARE - ICU (200)

## 2017-06-02 PROCEDURE — 80048 BASIC METABOLIC PNL TOTAL CA: CPT

## 2017-06-02 PROCEDURE — 700101 HCHG RX REV CODE 250: Performed by: INTERNAL MEDICINE

## 2017-06-02 PROCEDURE — A9270 NON-COVERED ITEM OR SERVICE: HCPCS | Performed by: INTERNAL MEDICINE

## 2017-06-02 PROCEDURE — 700111 HCHG RX REV CODE 636 W/ 250 OVERRIDE (IP): Performed by: HOSPITALIST

## 2017-06-02 PROCEDURE — 94003 VENT MGMT INPAT SUBQ DAY: CPT

## 2017-06-02 PROCEDURE — P9047 ALBUMIN (HUMAN), 25%, 50ML: HCPCS | Performed by: INTERNAL MEDICINE

## 2017-06-02 PROCEDURE — 700111 HCHG RX REV CODE 636 W/ 250 OVERRIDE (IP): Performed by: INTERNAL MEDICINE

## 2017-06-02 PROCEDURE — 99232 SBSQ HOSP IP/OBS MODERATE 35: CPT | Performed by: HOSPITALIST

## 2017-06-02 PROCEDURE — 99291 CRITICAL CARE FIRST HOUR: CPT | Performed by: INTERNAL MEDICINE

## 2017-06-02 PROCEDURE — 82803 BLOOD GASES ANY COMBINATION: CPT

## 2017-06-02 PROCEDURE — 85025 COMPLETE CBC W/AUTO DIFF WBC: CPT

## 2017-06-02 PROCEDURE — 94668 MNPJ CHEST WALL SBSQ: CPT

## 2017-06-02 PROCEDURE — 94667 MNPJ CHEST WALL 1ST: CPT

## 2017-06-02 PROCEDURE — 90935 HEMODIALYSIS ONE EVALUATION: CPT

## 2017-06-02 PROCEDURE — 82962 GLUCOSE BLOOD TEST: CPT

## 2017-06-02 RX ADMIN — INSULIN HUMAN 5 UNITS: 100 INJECTION, SUSPENSION SUBCUTANEOUS at 08:18

## 2017-06-02 RX ADMIN — AMIODARONE HYDROCHLORIDE 400 MG: 200 TABLET ORAL at 20:23

## 2017-06-02 RX ADMIN — LINEZOLID 600 MG: 600 TABLET, FILM COATED ORAL at 20:58

## 2017-06-02 RX ADMIN — ASPIRIN 81 MG: 81 TABLET ORAL at 08:50

## 2017-06-02 RX ADMIN — STANDARDIZED SENNA CONCENTRATE AND DOCUSATE SODIUM 2 TABLET: 8.6; 5 TABLET, FILM COATED ORAL at 20:23

## 2017-06-02 RX ADMIN — ONDANSETRON 4 MG: 2 INJECTION INTRAMUSCULAR; INTRAVENOUS at 21:25

## 2017-06-02 RX ADMIN — FAMOTIDINE 20 MG: 20 TABLET, FILM COATED ORAL at 08:50

## 2017-06-02 RX ADMIN — INSULIN LISPRO 3 UNITS: 100 INJECTION, SOLUTION INTRAVENOUS; SUBCUTANEOUS at 06:05

## 2017-06-02 RX ADMIN — ATORVASTATIN CALCIUM 40 MG: 40 TABLET, FILM COATED ORAL at 20:23

## 2017-06-02 RX ADMIN — AMIODARONE HYDROCHLORIDE 400 MG: 200 TABLET ORAL at 08:50

## 2017-06-02 RX ADMIN — CHLORHEXIDINE GLUCONATE 15 ML: 1.2 RINSE ORAL at 08:50

## 2017-06-02 RX ADMIN — PROCHLORPERAZINE EDISYLATE 10 MG: 5 INJECTION INTRAMUSCULAR; INTRAVENOUS at 23:24

## 2017-06-02 RX ADMIN — FENTANYL CITRATE 25 MCG: 50 INJECTION, SOLUTION INTRAMUSCULAR; INTRAVENOUS at 21:42

## 2017-06-02 RX ADMIN — INSULIN LISPRO 3 UNITS: 100 INJECTION, SOLUTION INTRAVENOUS; SUBCUTANEOUS at 23:24

## 2017-06-02 RX ADMIN — INSULIN HUMAN 5 UNITS: 100 INJECTION, SUSPENSION SUBCUTANEOUS at 17:24

## 2017-06-02 RX ADMIN — Medication 12.5 G: at 06:40

## 2017-06-02 RX ADMIN — CARVEDILOL 6.25 MG: 6.25 TABLET, FILM COATED ORAL at 17:20

## 2017-06-02 RX ADMIN — STANDARDIZED SENNA CONCENTRATE AND DOCUSATE SODIUM 2 TABLET: 8.6; 5 TABLET, FILM COATED ORAL at 08:49

## 2017-06-02 RX ADMIN — PROPOFOL 35 MCG/KG/MIN: 10 INJECTION, EMULSION INTRAVENOUS at 04:57

## 2017-06-02 RX ADMIN — ONDANSETRON 4 MG: 2 INJECTION INTRAMUSCULAR; INTRAVENOUS at 11:38

## 2017-06-02 RX ADMIN — INSULIN LISPRO 4 UNITS: 100 INJECTION, SOLUTION INTRAVENOUS; SUBCUTANEOUS at 11:39

## 2017-06-02 RX ADMIN — INSULIN LISPRO 4 UNITS: 100 INJECTION, SOLUTION INTRAVENOUS; SUBCUTANEOUS at 17:25

## 2017-06-02 RX ADMIN — ERYTHROPOIETIN 10000 UNITS: 10000 INJECTION, SOLUTION INTRAVENOUS; SUBCUTANEOUS at 07:00

## 2017-06-02 RX ADMIN — LIDOCAINE HYDROCHLORIDE 2 ML: 10 INJECTION, SOLUTION INFILTRATION; PERINEURAL at 00:28

## 2017-06-02 RX ADMIN — LINEZOLID 600 MG: 600 TABLET, FILM COATED ORAL at 08:49

## 2017-06-02 ASSESSMENT — COPD QUESTIONNAIRES
COPD SCREENING SCORE: 4
HAVE YOU SMOKED AT LEAST 100 CIGARETTES IN YOUR ENTIRE LIFE: NO/DON'T KNOW
DO YOU EVER COUGH UP ANY MUCUS OR PHLEGM?: NO/ONLY WITH OCCASIONAL COLDS OR INFECTIONS
DURING THE PAST 4 WEEKS HOW MUCH DID YOU FEEL SHORT OF BREATH: SOME OF THE TIME

## 2017-06-02 ASSESSMENT — ENCOUNTER SYMPTOMS
NERVOUS/ANXIOUS: 0
ABDOMINAL PAIN: 0
VOMITING: 0
HALLUCINATIONS: 0
SEIZURES: 0
BACK PAIN: 1
EYE REDNESS: 0
CHILLS: 0
PALPITATIONS: 0
FEVER: 0
COUGH: 1
EYE DISCHARGE: 0
FOCAL WEAKNESS: 0
SHORTNESS OF BREATH: 1
FALLS: 0
SPEECH CHANGE: 0
WEAKNESS: 1
NAUSEA: 0
FLANK PAIN: 0

## 2017-06-02 ASSESSMENT — LIFESTYLE VARIABLES: EVER_SMOKED: NEVER

## 2017-06-02 ASSESSMENT — PULMONARY FUNCTION TESTS: FVC: 1.2

## 2017-06-02 ASSESSMENT — PAIN SCALES - GENERAL: PAINLEVEL_OUTOF10: 0

## 2017-06-02 NOTE — CARE PLAN
Problem: Infection  Goal: Will remain free from infection  Droplet precautions in place.   Intervention: Implement standard precautions and perform hand washing before and after patient contact  Hand hygiene preformed before and after patient contact.       Problem: Fluid Volume:  Goal: Will maintain balanced intake and output  Outcome: PROGRESSING AS EXPECTED  Intake and output documented in I&Os. Dialysis preformed.

## 2017-06-02 NOTE — PROGRESS NOTES
Received report and assumed patient care. Patient is intubated and sedated. Patient is receiving dialysis at this time. VS stable.

## 2017-06-02 NOTE — PROGRESS NOTES
Pulmonary Critical Care Progress Note      Date of service: 6/2/2017    Chief Complaint: Cardiac Arrest    History of Present Illness: Patient is a 65 y/o F, history of end stage renal disease, Right AV fistula, chronic HD, DM, admitted through the ED on 5/27/2017 after out of hospital cardiac arrest asystole with ROSC after two rounds of epi. Intubated remains on full ventilator support but now awake and following commands in [place]ian.     ROS:    Respiratory: unable to perform due to the patient's inability to effectively communicate,   Cardiac: unable to perform due to the patient's inability to effectively communicate,   GI: unable to perform due to the patient's inability to effectively communicate.      Interval Events:  24 hour interval history reviewed   Vent day 7, 8/45%, marginal parameters today  CXR improving bilat inf's  Off heparin  zyvox day 4 of 7 for MRSA pneumonia  HD today; may extubate today       PFSH:  No change.    Physical Exam  General: Sedate on propofol.   Neuro/Psych: Arouses, follows commands with family at bedside in [place]ian. Moving all extremities symmetrically. PERRL. Cortrak in place. ET tube in place. Neck supple. Trachea midline. Triple lumed site CDI.   HEENT: Right nare coretrak, Supple, midline, no crepitus. Right IJ CVC CDI  CVS: Distant, regular.   Respiratory: Moderately diminished, clear. No wheezing.   Abdomen/: Mildly distended. Soft. NT. Tolerating tube feed. Bowel sounds present.   Extremities: Distal pulses 2+ bilateral.   Skin: Cool, dry, perfused. Capillary refill intact.       Respiratory:  Ordaz Vent Mode: APVCMV, Rate (breaths/min): 16, Vt Target (mL): 320, PEEP/CPAP: 8, FiO2: 45, P Support: 5, Static Compliance (ml / cm H2O): 37.3, Weaning Trial Stopped due to:: Pt weaned for 1 hour and returned to rest settings per protocol, Length of Weaning Trial (Hours): 1, Control VTE (exp VT): 360  Pulse Oximetry: 100 %  ImagingAvailable data reviewed   Recent Labs       05/31/17   0538  06/01/17   0450  06/02/17   0533   ISTATAPH  7.475  7.491  7.467   ISTATAPCO2  45.0*  43.6*  36.9   ISTATAPO2  70  77  113*   ISTATATCO2  34*  35*  28   [id number]  95  96  99   ISTATARTHCO3  33.1*  33.3*  26.6*   ISTATARTBE  8*  9*  3   ISTATTEMP  37.2 C  37.5 C  37.1 C   ISTATFIO2  45  45  45   ISTATSPEC  Arterial  Arterial  Arterial   ISTATAPHTC  7.472  7.484  7.465   [id number]  71  79  113*     HemoDynamics:  Pulse: (!) 48, Heart Rate (Monitored): (!) 53  NIBP: (!) 82/34 mmHg    Imaging: Available data reviewed     ECHO 5/27:  CONCLUSIONS  No prior study is available for comparison.   Normal left ventricular chamber size. Normal left ventricular wall thickness. Moderately reduced left ventricular systolic function. Left ventricular ejection fraction is visually estimated to be 35%. Normal   regional wall motion.   Grade II diastolic dysfunction.  Moderately dilated left atrium.  Moderate mitral regurgitation.  Estimated right ventricular systolic pressure is at least 45 mmHg.  IVC not visualized.        Neuro:  GCS Total Reed Coma Score: 11  Imaging: Available data reviewed    Fluids:  Intake/Output       05/31/17 0700 - 06/01/17 0659 06/01/17 0700 - 06/02/17 0659 06/02/17 0700 - 06/03/17 0659      0645-3493 9851-8145 Total 0465-8475 7104-7542 Total 3056-6038 2156-9164 Total       Intake    I.V.  409.5  254.4 663.9  315  219.4 534.4  69  -- 69    Propofol Volume 129.5 104.4 233.9 95 99.4 194.4 29 -- 29    Heparin Volume 180 90 270 -- -- -- -- -- --    IV Volume -- 60 60 120 120 240 40 -- 40     -- 100 -- -- -- -- -- --    IV Piggyback Volume (Albumin 25%) -- -- -- 100 -- 100 -- -- --    Other  --  90 90  --  60 60  100  -- 100    Medications (P.O./ Enteral Liquids) -- 90 90 -- 60 60 100 -- 100    Dialysis  650  -- 650  500  -- 500  --  -- --    Dialysis Volume (Dialysis Intake) 650 -- 650 500 -- 500 -- -- --    Enteral  80  660 740  600  570 1170  260  -- 260    Enteral Volume 80  480 560 480 480 960 160 -- 160    Free Water / Tube Flush -- 180 180 120 90 210 100 -- 100    Total Intake 1139.5 1004.4 2143.9 1415 849.4 2264.4 429 -- 429       Output    Urine  125  260 385  225  65 290  20  -- 20    Indwelling Cathether 125 260 385 225 65 290 20 -- 20    Dialysis  2700  -- 2700    -- 2050  -2000    Dialysis Output (Dialysis Output) 270 -- 2700 -2050    Stool  --  -- --  --  -- --  --  -- --    Number of Times Stooled -- 1 x 1 x 1 x -- 1 x -- -- --    Total Output 2825 260 3085 2275 65 2340  --        Net I/O     -1685.5 744.4 -941.1 -860 784.4 -75.6 -1591 -- -1591           Recent Labs      17   0425   SODIUM  134*  135  134*   POTASSIUM  4.2  3.9  4.2   CHLORIDE  97  93*  95*   CO2  30  31  29   BUN  25*  38*  40*   CREATININE  2.74*  2.76*  2.65*   CALCIUM  9.1  9.0  9.3     GI/Nutrition:  Imaging: Available data reviewed  NPO and tube feed Tolerated at 40.     Liver Function:  Recent Labs      17   03317   0425   GLUCOSE  156*  148*  204*     Heme:  Recent Labs      17   0150  17   0425   RBC   --    --   2.85*  2.90*  3.06*   HEMOGLOBIN   --    --   7.7*  7.9*  8.4*   HEMATOCRIT   --    --   24.3*  24.8*  26.2*   PLATELETCT   --    --   111*  136*  152*   APTT  81.1*  71.0*   --   33.0   --      Infectious Disease:  Temp  Av.9 °C (98.4 °F)  Min: 36.9 °C (98.4 °F)  Max: 36.9 °C (98.4 °F)  Monitored Temp  Av.4 °C (99.4 °F)  Min: 36.9 °C (98.4 °F)  Max: 37.9 °C (100.2 °F)  Micro: reviewed   Ceftriaxone and Flagyl.   ? Staph aureus pending.   Recent Labs      17   0415  17   0330  17   0425   WBC  7.2  5.9  7.3   NEUTSPOLYS  79.30*  62.60  69.90   LYMPHOCYTES  12.20*  20.90*  15.20*   MONOCYTES  6.10  11.30  9.60   EOSINOPHILS  1.10  2.70  1.90   BASOPHILS  0.30  0.30  0.50     Current  Facility-Administered Medications   Medication Dose Frequency Provider Last Rate Last Dose   • insulin NPH (HUMULIN,NOVOLIN) injection 5 Units  5 Units BID INSULIN Macario Jo M.D.   5 Units at 06/02/17 0818   • albumin human 25% solution 12.5 g  12.5 g Q HOUR PRN Ricardo Petersen M.D.   Stopped at 06/02/17 0715   • carvedilol (COREG) tablet 6.25 mg  6.25 mg BID WITH MEALS Carlos Manuel Man M.D.   Stopped at 06/02/17 0730   • insulin lispro (HUMALOG) injection 3-14 Units  3-14 Units Q6HRS Carlos Manuel Man M.D.   3 Units at 06/02/17 0605   • linezolid (ZYVOX) tablet 600 mg  600 mg Q12HRS Carlos Manuel Man M.D.   600 mg at 06/02/17 0849   • NS (BOLUS) infusion 250 mL  250 mL DIALYSIS PRN Ricardo Petersen M.D.       • epoetin micehal (EPOGEN,PROCRIT) injection 10,000 Units  10,000 Units MO, WE +  Ricardo Petersen M.D.   10,000 Units at 06/02/17 0700   • NS (BOLUS) infusion 250 mL  250 mL DIALYSIS PRN Ricardo Petersen M.D.       • heparin 1000 units/mL injection 2,600 Units  2,600 Units PRN Ricardo Castaneda M.D.        And   • heparin infusion 25,000 units in 500 ml 0.45% nacl   Continuous Ricardo Castaneda M.D.   Stopped at 05/31/17 2249   • aspirin EC (ECOTRIN) tablet 81 mg  81 mg DAILY Mulu Bryant M.D.   81 mg at 06/02/17 0850   • atorvastatin (LIPITOR) tablet 40 mg  40 mg QHS Mulu Bryant M.D.   40 mg at 06/01/17 2020   • amiodarone (CORDARONE) tablet 400 mg  400 mg TWICE DAILY Mulu Bryant M.D.   400 mg at 06/02/17 0850   • [START ON 6/5/2017] amiodarone (CORDARONE) tablet 200 mg  200 mg TWICE DAILY Mulu Bryant, M.D.       • [START ON 6/13/2017] amiodarone (CORDARONE) tablet 200 mg  200 mg Q DAY Mulu Bryant M.D.       • acetaminophen (TYLENOL) suppository 650 mg  650 mg Q6HRS PRN Dragan Martinez M.D.   650 mg at 05/28/17 0444   • Respiratory Care per Protocol   Continuous RT Dragan Martinez M.D.       • senna-docusate (PERICOLACE or SENOKOT S) 8.6-50 MG per tablet 2 Tab  2 Tab BID Dragan Viveros  WALESKA Harman   2 Tab at 06/02/17 0849    And   • polyethylene glycol/lytes (MIRALAX) PACKET 1 Packet  1 Packet QDAY PRN Dragan Martinez M.D.   1 Packet at 05/30/17 2121    And   • magnesium hydroxide (MILK OF MAGNESIA) suspension 30 mL  30 mL QDAY PRN Dragan Martinez M.D.        And   • bisacodyl (DULCOLAX) suppository 10 mg  10 mg QDAY PRN Dragan Martinez M.D.       • chlorhexidine (PERIDEX) 0.12 % solution 15 mL  15 mL BID Dragan Martinez M.D.   15 mL at 06/02/17 0850   • lidocaine (XYLOCAINE) 1%  injection  1-2 mL Q30 MIN PRN Dragan Martinez M.D.   2 mL at 06/02/17 0028   • NS infusion   Continuous Dragan Martinez M.D. 10 mL/hr at 05/30/17 1945     • fentaNYL (SUBLIMAZE) injection 25 mcg  25 mcg Q HOUR PRN Dragan Martinez M.D.   25 mcg at 06/01/17 2013    Or   • fentaNYL (SUBLIMAZE) injection 50 mcg  50 mcg Q HOUR PRN Dragan Martinez M.D.   50 mcg at 05/31/17 2136    Or   • fentaNYL (SUBLIMAZE) injection 100 mcg  100 mcg Q HOUR PRN Dragan Martinez M.D.   100 mcg at 05/27/17 2101   • ipratropium-albuterol (DUONEB) nebulizer solution 3 mL  3 mL Q2HRS PRN (RT) Dragan Martinez M.D.       • famotidine (PEPCID) tablet 20 mg  20 mg DAILY Dragan Martinez M.D.   20 mg at 06/02/17 0850   • propofol (DIPRIVAN) injection  5-80 mcg/kg/min Continuous Dragan Martinez M.D. 4.4 mL/hr at 06/02/17 0823 10 mcg/kg/min at 06/02/17 0823   • enalaprilat (VASOTEC) injection 1.25 mg  1.25 mg Q6HRS PRN Dragan W Adrián, D.O.       • labetalol (NORMODYNE,TRANDATE) injection 10 mg  10 mg Q4HRS PRN GERARDO RichardsO.       • ondansetron (ZOFRAN) syringe/vial injection 4 mg  4 mg Q4HRS PRN GERARDO RichardsO.       • ondansetron (ZOFRAN ODT) dispertab 4 mg  4 mg Q4HRS PRN GERARDO RichardsO.       • acetaminophen (TYLENOL) tablet 650 mg  650 mg Q6HRS PRN ISABEL Richards.O.       • glucose 4 g chewable tablet 16 g  16 g Q15 MIN PRN Dragan Sampson D.O.        And    • dextrose 50% (D50W) injection 25 mL  25 mL Q15 MIN PRN Dragan Sampson D.O.         This patient's medications have not been reviewed.    Quality  Measures:  Labs reviewed, Medications reviewed and Radiology images reviewed                    Assessment and Plan:  Ventilator-dependent respiratory failure.   - Intubated AM 5/27   - cxr improving   - SBT improved after HD/UF today   - will extubate; follow closely; may require re-intubation    - RT protocols  Abnormal CXR - RLL ATX/pna   - zyvox day 4 of 7 for MRSA pneumonia  Status post witnessed out-of-hospital asystolic cardiac arrest.    - Cardiology following- primary  Cardiac arrest/NSTEMI?/valvular heart Dz/myop   - Cath after off vent; d/w'd cardiology    - correct lytes  Cardiomyopathy - EF 35%   - Grade II DD  PHTN - mild-moderate RVSP 45   - Secondary to MR? Moderate by ECHO  Encephalopathy   - improved; follows   - speaks Lao; family assists  Anemia with thrombocytopenia.   - Serial lab - transfuse PRN with restrictive threshold  End-stage renal disease, on maintenance hemodialysis.   - Renal following   - HD daily  Diabetes mellitus - glycemic control  Enteral nutrrition - Cortrak  ERP  Mobilize  Prophylaxis   Daily SBTs after dialysis       Discussed patient condition and risk of morbidity and/or mortality with Family, RN, RT, Pharmacy, Charge nurse / hot rounds and cardiology and nephrology.    The patient remains critically ill.  Critical care time = 31 minutes in directly providing and coordinating critical care and extensive data review.  No time overlap and excludes procedures.

## 2017-06-02 NOTE — PROGRESS NOTES
Cardiology brief progress note    Patient still intubated.   Tele reviewed. Still having paroxymal A.fib, rate controlled.   Had a 3.3 second post-conversion pause. This is not a concerning finding. No indication for pacemaker.  Continue coreg at current dose and oral amiodarone load.   On heparin gtt    Will peripherally follow.   Plan for cardiac cath when pt extubated.     Mulu Bryant MD  Cardiologist  [facility]

## 2017-06-02 NOTE — DIETARY
"Nutrition Services: Weekly TF update    Current TF regimen (with propofol): Peptamen Intense VHP @ goal rate 40 ml/hr, providing 960 kcal (+Kcal from propofol), 89 grams protein, 806 ml free water per day.    Ht: 62\", Wt: 71.9 kg, BMI= 28.98  Labs: Na 134, glu 204, BUN 40, Cr 2.65, GFR 18, POC glu/24 hours 156-198, triglycerides 181 (5/31), pre-albumin 12.0 (5/29), CRP 15.33 (5/29)  Meds: Human albumin with dialysis, epogen, pepcid, SSI, insulin NPH (started today), propofol, senokot; prn bowel meds  Fluids: NS @ 10 ml/hr  Skin: BM today  GI: No breakdown noted    Estimated needs:  REE per MSJ x1.2 = 1443 kcal/day  RMR per PSU (vent L/min 5.1, T max/24 hours 37.9) = 1430 kcal/day  REE per metabolic cart study (5/31) = 1776 kcal/day (strong study per RQ 0.79, Covar 5%)  1.1-1.3 grams protein/kg =  grams/day    Assessment/Plan:  Admit day 6.  Pt remains intubated. Per PMA, probable extubation today.  TF @ goal.  Propofol now paused. TF regimen will change off of propofol. Low-carbohydrate TF formula indicated.  Weight stable since admit.   -2 L per HD today.    Recommend:  When pt no longer on propofol, change TF to Diabetisource AC @ goal rate 55 ml/hr to provide 1584 kcal, 79 grams protein, and 1082 ml free water per day.  Fluids per MD.  PO diet when safe and appropriate.    RD following.    "

## 2017-06-02 NOTE — PROGRESS NOTES
Hospital Medicine Interval Note  Date of Service:  6/1/2017    Chief Complaint  66 y.o.-year-old female admitted 5/27/2017 with cardiac arrest.    Interval Problem Update  Hemoptysis last night resolved with holding heparin, failed SBT, paroxysmal afib    Consultants/Specialty  Pulmonology  Cardiology  Nephrology      Disposition  ICU.     Review of Systems   Unable to perform ROS: intubated      Physical Exam Laboratory/Imaging   Filed Vitals:    06/01/17 1300 06/01/17 1400 06/01/17 1418 06/01/17 1500   BP:       Pulse: 68 70  40   Temp:       Resp: 17 19  18   Height:       Weight:       SpO2: 100% 100% 100% 100%     Physical Exam   Constitutional: She appears well-developed.   HENT:   Head: Normocephalic and atraumatic.   Right Ear: External ear normal.   Left Ear: External ear normal.   Eyes: Conjunctivae are normal. Right eye exhibits no discharge. Left eye exhibits no discharge.   Neck: No JVD present. No tracheal deviation present.   Cardiovascular: Normal rate and regular rhythm.  Exam reveals no friction rub.    No murmur heard.  Pulmonary/Chest: No stridor. No respiratory distress. She has no decreased breath sounds. She has no wheezes. She has rales. She exhibits no tenderness and no crepitus.   intubated   Abdominal: Soft. Bowel sounds are normal. She exhibits no distension. There is no tenderness. There is no rebound.   Musculoskeletal: She exhibits edema. She exhibits no tenderness.   Neurological: No cranial nerve deficit. She exhibits normal muscle tone.   Sedated     Skin: Skin is warm and dry. She is not diaphoretic. No cyanosis. Nails show no clubbing.   Psychiatric:   sedated    Lab Results   Component Value Date/Time    WBC 5.9 06/01/2017 03:30 AM    HEMOGLOBIN 7.9* 06/01/2017 03:30 AM    HEMATOCRIT 24.8* 06/01/2017 03:30 AM    PLATELET COUNT 136* 06/01/2017 03:30 AM     Lab Results   Component Value Date/Time    SODIUM 135 06/01/2017 03:30 AM    POTASSIUM 3.9 06/01/2017 03:30 AM    CHLORIDE 93*  06/01/2017 03:30 AM    CO2 31 06/01/2017 03:30 AM    GLUCOSE 148* 06/01/2017 03:30 AM    BUN 38* 06/01/2017 03:30 AM    CREATININE 2.76* 06/01/2017 03:30 AM      Assessment/Plan    Cardiac arrest (CMS-HCC) (present on admission)  Assessment & Plan  With return of spontaneous circulation.   Will need angiogram post extubation      ESRD (end stage renal disease) (CMS-HCC) (present on admission)  Assessment & Plan  HD MWF per nephrology    Acute respiratory failure with hypoxia (CMS-HCC) (present on admission)  Assessment & Plan  Vent management per CC discussed with Dr Dang  SBT as tolerated discussed with RN to do SBT when family at bedside to help with language barrier    Atrial fibrillation (CMS-HCC) (present on admission)  Assessment & Plan  Paroxysmal, continue amiodarone coregresume  heparin drip  Cardiology following plan is for cath when extubated    Anemia (present on admission)  Assessment & Plan  Likely chronic renal disease  Procrit   Hg 7.9 stable    IDDM (insulin dependent diabetes mellitus) (CMS-HCC) (present on admission)  Assessment & Plan  Add NPH continue ISS monitor cbg's  and adjust accordingly     Dyslipidemia (present on admission)  Assessment & Plan  lipitor    Cardiomyopathy (CMS-HCC) (present on admission)  Assessment & Plan    EF35%    Continue medical management and fluid management with HD  Continue  carvedilol  , hold off  ACEI given low BP      Aspiration pneumonia (CMS-HCC) (present on admission)  Assessment & Plan  Cx MRSA  On zyvox day 3/7  Completed 5 day course ceft flagyl for aspiration Pna         Plan of care reviewed with patient's daughter and son at bedside  and discussed with nursing staff  Labs reviewed and Medications reviewed  Patricia catheter: Unconscious / Sedated Patient on a Ventilator  Central line in place: Need for access    DVT Prophylaxis: Heparin      Antibiotics: Treating active infection/contamination beyond 24 hours perioperative coverage

## 2017-06-02 NOTE — CARE PLAN
Problem: Knowledge Deficit  Goal: Knowledge of disease process/condition, treatment plan, diagnostic tests, and medications will improve  Outcome: PROGRESSING AS EXPECTED  POC discussed with patient and family.

## 2017-06-02 NOTE — PROGRESS NOTES
Nephrology Progress Note, Adult, Complex               Author: Ricardo Petersen   Date & Time created: 6/2/2017  10:55 AM   Interval History:  65 y/o female with ESRD/HD, s/p cardiac arrest  Currently on daily HD, UF   HD today with 2 L off  Has been relatively even on a daily basis    Review of Systems:  Review of Systems   Unable to perform ROS: intubated       Physical Exam:  Physical Exam   Constitutional: She appears well-developed and well-nourished. No distress. She is intubated.   HENT:   Head: Normocephalic and atraumatic.   ETT   Eyes: Pupils are equal, round, and reactive to light.   Cardiovascular: Normal rate and regular rhythm.  Exam reveals no gallop and no friction rub.    Pulmonary/Chest: She is intubated. She has no wheezes. She has no rales.   vented   Abdominal: Soft. Bowel sounds are normal. She exhibits no distension.   Musculoskeletal: She exhibits no edema.   Nursing note and vitals reviewed.      Labs:  Recent Labs      05/31/17   0538  06/01/17   0450  06/02/17   0533   ISTATAPH  7.475  7.491  7.467   ISTATAPCO2  45.0*  43.6*  36.9   ISTATAPO2  70  77  113*   ISTATATCO2  34*  35*  28   [id number]  95  96  99   ISTATARTHCO3  33.1*  33.3*  26.6*   ISTATARTBE  8*  9*  3   ISTATTEMP  37.2 C  37.5 C  37.1 C   ISTATFIO2  45  45  45   ISTATSPEC  Arterial  Arterial  Arterial   ISTATAPHTC  7.472  7.484  7.465   [id number]  71  79  113*         Recent Labs      05/31/17 0415  06/01/17   0330  06/02/17   0425   SODIUM  134*  135  134*   POTASSIUM  4.2  3.9  4.2   CHLORIDE  97  93*  95*   CO2  30  31  29   BUN  25*  38*  40*   CREATININE  2.74*  2.76*  2.65*   CALCIUM  9.1  9.0  9.3     Recent Labs      05/31/17   0415  06/01/17   0330  06/02/17   0425   GLUCOSE  156*  148*  204*     Recent Labs      05/30/17   1955  05/31/17   0150  05/31/17   0415  06/01/17   0330  06/02/17   0425   RBC   --    --   2.85*  2.90*  3.06*   HEMOGLOBIN   --    --   7.7*  7.9*  8.4*   HEMATOCRIT   --    --   24.3*   24.8*  26.2*   PLATELETCT   --    --   111*  136*  152*   APTT  81.1*  71.0*   --   33.0   --      Recent Labs      17   0415  17   0330  17   0425   WBC  7.2  5.9  7.3   NEUTSPOLYS  79.30*  62.60  69.90   LYMPHOCYTES  12.20*  20.90*  15.20*   MONOCYTES  6.10  11.30  9.60   EOSINOPHILS  1.10  2.70  1.90   BASOPHILS  0.30  0.30  0.50           Hemodynamics:  Temp (24hrs), Av.9 °C (98.4 °F), Min:36.9 °C (98.4 °F), Max:36.9 °C (98.4 °F)  Temperature: 36.9 °C (98.4 °F), Monitored Temp: 36.9 °C (98.4 °F)  Pulse  Av.9  Min: 40  Max: 109Heart Rate (Monitored): (!) 53  NIBP: (!) 82/34 mmHg     Respiratory:  Ordaz Vent Mode: APVCMV, Rate (breaths/min): 16, PEEP/CPAP: 8, FiO2: 45, P Peak (PIP): 19, P MEAN: 11 Respiration: 18, Pulse Oximetry: 100 %     Work Of Breathing / Effort: Vented  RUL Breath Sounds: Clear, RML Breath Sounds: Diminished, RLL Breath Sounds: Diminished, DEV Breath Sounds: Clear, LLL Breath Sounds: Diminished  Fluids:    Intake/Output Summary (Last 24 hours) at 17 1055  Last data filed at 17 1000   Gross per 24 hour   Intake 2458.56 ml   Output   4360 ml   Net -1901.44 ml        GI/Nutrition:  Orders Placed This Encounter   Procedures   • Diet NPO     Standing Status: Standing      Number of Occurrences: 1      Standing Expiration Date:      Order Specific Question:  Restrict to:     Answer:  With Tube Feed [4]     Medical Decision Making, by Problem:  Active Hospital Problems    Diagnosis   • Cardiac arrest (CMS-HCC) [I46.9]   • Atrial fibrillation (CMS-HCC) [I48.91]   • ESRD (end stage renal disease) (CMS-HCC) [N18.6]   • Acute respiratory failure with hypoxia (CMS-HCC) [J96.01]   • HTN (hypertension) [I10]   • IDDM (insulin dependent diabetes mellitus) (CMS-[facility]) [E11.9, Z79.4]   • Dyslipidemia [E78.5]   • Cardiomyopathy (CMS-HCC) [I42.9]   • Anemia [D64.9]   • Elevated troponin [R74.8]       Medications reviewed and Labs reviewed                      Assessment  and Plan  1.ESRD/HD - Daily HD / UF for now, adjust as needed daily  2.HTN: BP currently 80s  3.Electrolytes: well controlled.  4.Anemia: Hgb 8.4, epogen 10k  5.S/p cardiac arrest  6.Volume:well controlled with UF/HD    Daily HD continues

## 2017-06-02 NOTE — PROGRESS NOTES
HD treatment today per routine order using RUAAVF. Difficult to UF.Patient hypotensive mostly during treatment,albumin and NS given for bp support.Dr Petersen notified.Net UF removed 2000 ml.Report given to primary Rn.

## 2017-06-02 NOTE — CARE PLAN
Problem: Pain Management  Goal: Pain level will decrease to patient’s comfort goal  Pain assessed using CPOT assessment scoring see doc flow sheets and MAR.     Problem: Mobility  Goal: Risk for activity intolerance will decrease  Patient and family members educated on importance of mobilizing, mobilized to edge of bed tolerated for 5 minutes. Patient nodded that she was tired educated on rest periods.

## 2017-06-02 NOTE — CARE PLAN
Problem: Safety  Goal: Will remain free from injury  Outcome: PROGRESSING AS EXPECTED  Bed locked and in lowest position. Bilateral wrist restraints in place.     Problem: Venous Thromboembolism (VTW)/Deep Vein Thrombosis (DVT) Prevention:  Goal: Patient will participate in Venous Thrombosis (VTE)/Deep Vein Thrombosis (DVT)Prevention Measures  Outcome: PROGRESSING AS EXPECTED  SCDs in place.

## 2017-06-02 NOTE — CARE PLAN
Adult Ventilation Update    Total Vent Days: 7    Patient Lines/Drains/Airways Status    Active Airway     Name: Placement date: Placement time: Site: Days:    Airway Group ET Tube Oral 7.5 05/27/17    Oral  6              In the last 24 hours, the patient tolerated SBT for 2 hours on settings of 5/8.    #FVC / Vital Capacity (liters) : 0.82 (06/01/17 1859)  NIF (cm H2O) : -23 (06/01/17 1859)  Rapid Shallow Breathing Index (RR/VT): 84 (06/01/17 1859)  Plateau Pressure (Q Shift): 17 (06/01/17 1859)  Static Compliance (ml / cm H2O): 37.2 (06/02/17 0237)    Patient failed trials because of Barriers to Wean:  (Dialysis) (05/31/17 0708)  Barriers to SBT Weaning Trial Stopped due to:: Pt weaned for 1 hour and returned to rest settings per protocol (06/01/17 1859)  Length of Weaning Trial Length of Weaning Trial (Hours): 1 (06/01/17 1859)        Sputum/Suction   Cough: Productive (06/02/17 0400)  Sputum Amount: Small (06/02/17 0400)  Sputum Color: Clear (06/02/17 0400)  Sputum Consistency: Thin (06/02/17 0400)    Mobility Group  Activity Performed: Edge of bed (06/02/17 0000)  Time Activity Tolerated: 10 min (06/02/17 0000)  Distance Per Occurrence (ft.): 0 feet (06/02/17 0000)  # of Times Distance was Traveled: 0 (06/02/17 0000)  Assistance / Tolerance: Assistance of Two or More;General Weakness (06/02/17 0000)  Pt Calls for Assistance: No (06/02/17 0000)  Staff Present for Mobilization: RN (06/02/17 0000)  Assistive Devices: Hand held assist (06/02/17 0000)  Reason Not Mobilized: Other (comment) (will reassess with day RN) (05/30/17 0626)  Mobilization Comments: Per Individualized order by STEVE Millan MD that was verified by SEVERINO Chinchilla RN (05/27/17 2200)    Events/Summary/Plan: Pt placed back on rest settings per protocol (06/01/17 1859)

## 2017-06-02 NOTE — PROGRESS NOTES
12 Hour Chart Check  Monitor Summary:  Afib 70'-80's converted to sinus rhythm 50's-60's had a 2 second pause when converting  .18/.10/.48

## 2017-06-03 ENCOUNTER — APPOINTMENT (OUTPATIENT)
Dept: RADIOLOGY | Facility: MEDICAL CENTER | Age: 67
DRG: 264 | End: 2017-06-03
Attending: INTERNAL MEDICINE
Payer: MEDICARE

## 2017-06-03 LAB
ANION GAP SERPL CALC-SCNC: 11 MMOL/L (ref 0–11.9)
APTT PPP: 54.1 SEC (ref 24.7–36)
BASOPHILS # BLD AUTO: 0.5 % (ref 0–1.8)
BASOPHILS # BLD: 0.04 K/UL (ref 0–0.12)
BUN SERPL-MCNC: 51 MG/DL (ref 8–22)
CALCIUM SERPL-MCNC: 10.2 MG/DL (ref 8.5–10.5)
CHLORIDE SERPL-SCNC: 94 MMOL/L (ref 96–112)
CO2 SERPL-SCNC: 30 MMOL/L (ref 20–33)
CREAT SERPL-MCNC: 3.38 MG/DL (ref 0.5–1.4)
EOSINOPHIL # BLD AUTO: 0.12 K/UL (ref 0–0.51)
EOSINOPHIL NFR BLD: 1.4 % (ref 0–6.9)
ERYTHROCYTE [DISTWIDTH] IN BLOOD BY AUTOMATED COUNT: 56.6 FL (ref 35.9–50)
GFR SERPL CREATININE-BSD FRML MDRD: 14 ML/MIN/1.73 M 2
GLUCOSE BLD-MCNC: 141 MG/DL (ref 65–99)
GLUCOSE BLD-MCNC: 192 MG/DL (ref 65–99)
GLUCOSE BLD-MCNC: 215 MG/DL (ref 65–99)
GLUCOSE SERPL-MCNC: 162 MG/DL (ref 65–99)
HCT VFR BLD AUTO: 27.5 % (ref 37–47)
HGB BLD-MCNC: 8.6 G/DL (ref 12–16)
IMM GRANULOCYTES # BLD AUTO: 0.38 K/UL (ref 0–0.11)
IMM GRANULOCYTES NFR BLD AUTO: 4.4 % (ref 0–0.9)
LYMPHOCYTES # BLD AUTO: 1.13 K/UL (ref 1–4.8)
LYMPHOCYTES NFR BLD: 13.2 % (ref 22–41)
MCH RBC QN AUTO: 27.1 PG (ref 27–33)
MCHC RBC AUTO-ENTMCNC: 31.3 G/DL (ref 33.6–35)
MCV RBC AUTO: 86.8 FL (ref 81.4–97.8)
MONOCYTES # BLD AUTO: 0.75 K/UL (ref 0–0.85)
MONOCYTES NFR BLD AUTO: 8.7 % (ref 0–13.4)
NEUTROPHILS # BLD AUTO: 6.16 K/UL (ref 2–7.15)
NEUTROPHILS NFR BLD: 71.8 % (ref 44–72)
NRBC # BLD AUTO: 0.06 K/UL
NRBC BLD AUTO-RTO: 0.7 /100 WBC
PLATELET # BLD AUTO: 175 K/UL (ref 164–446)
PMV BLD AUTO: 10 FL (ref 9–12.9)
POTASSIUM SERPL-SCNC: 4.8 MMOL/L (ref 3.6–5.5)
RBC # BLD AUTO: 3.17 M/UL (ref 4.2–5.4)
SODIUM SERPL-SCNC: 135 MMOL/L (ref 135–145)
WBC # BLD AUTO: 8.6 K/UL (ref 4.8–10.8)

## 2017-06-03 PROCEDURE — 71010 DX-CHEST-PORTABLE (1 VIEW): CPT

## 2017-06-03 PROCEDURE — A9270 NON-COVERED ITEM OR SERVICE: HCPCS | Performed by: INTERNAL MEDICINE

## 2017-06-03 PROCEDURE — 700102 HCHG RX REV CODE 250 W/ 637 OVERRIDE(OP): Performed by: HOSPITALIST

## 2017-06-03 PROCEDURE — 80048 BASIC METABOLIC PNL TOTAL CA: CPT

## 2017-06-03 PROCEDURE — 99291 CRITICAL CARE FIRST HOUR: CPT | Performed by: INTERNAL MEDICINE

## 2017-06-03 PROCEDURE — 700102 HCHG RX REV CODE 250 W/ 637 OVERRIDE(OP): Performed by: INTERNAL MEDICINE

## 2017-06-03 PROCEDURE — 85730 THROMBOPLASTIN TIME PARTIAL: CPT

## 2017-06-03 PROCEDURE — A9270 NON-COVERED ITEM OR SERVICE: HCPCS | Performed by: HOSPITALIST

## 2017-06-03 PROCEDURE — 99232 SBSQ HOSP IP/OBS MODERATE 35: CPT | Performed by: HOSPITALIST

## 2017-06-03 PROCEDURE — 700111 HCHG RX REV CODE 636 W/ 250 OVERRIDE (IP): Performed by: HOSPITALIST

## 2017-06-03 PROCEDURE — 770022 HCHG ROOM/CARE - ICU (200)

## 2017-06-03 PROCEDURE — 85025 COMPLETE CBC W/AUTO DIFF WBC: CPT

## 2017-06-03 PROCEDURE — 90935 HEMODIALYSIS ONE EVALUATION: CPT

## 2017-06-03 PROCEDURE — 82962 GLUCOSE BLOOD TEST: CPT

## 2017-06-03 PROCEDURE — 94668 MNPJ CHEST WALL SBSQ: CPT

## 2017-06-03 RX ORDER — OXYCODONE HYDROCHLORIDE 5 MG/1
5 TABLET ORAL EVERY 6 HOURS PRN
Status: DISCONTINUED | OUTPATIENT
Start: 2017-06-03 | End: 2017-06-07 | Stop reason: HOSPADM

## 2017-06-03 RX ORDER — LIDOCAINE AND PRILOCAINE 25; 25 MG/G; MG/G
CREAM TOPICAL
Status: DISCONTINUED | OUTPATIENT
Start: 2017-06-05 | End: 2017-06-07 | Stop reason: HOSPADM

## 2017-06-03 RX ADMIN — LINEZOLID 600 MG: 600 TABLET, FILM COATED ORAL at 08:07

## 2017-06-03 RX ADMIN — FAMOTIDINE 20 MG: 20 TABLET, FILM COATED ORAL at 08:07

## 2017-06-03 RX ADMIN — CARVEDILOL 6.25 MG: 6.25 TABLET, FILM COATED ORAL at 08:07

## 2017-06-03 RX ADMIN — OMEPRAZOLE 40 MG: 20 CAPSULE, DELAYED RELEASE ORAL at 12:15

## 2017-06-03 RX ADMIN — STANDARDIZED SENNA CONCENTRATE AND DOCUSATE SODIUM 2 TABLET: 8.6; 5 TABLET, FILM COATED ORAL at 19:47

## 2017-06-03 RX ADMIN — OXYCODONE HYDROCHLORIDE 5 MG: 5 TABLET ORAL at 21:22

## 2017-06-03 RX ADMIN — ASPIRIN 81 MG: 81 TABLET ORAL at 08:07

## 2017-06-03 RX ADMIN — ATORVASTATIN CALCIUM 40 MG: 40 TABLET, FILM COATED ORAL at 19:47

## 2017-06-03 RX ADMIN — ACETAMINOPHEN 650 MG: 325 TABLET, FILM COATED ORAL at 14:55

## 2017-06-03 RX ADMIN — LINEZOLID 600 MG: 600 TABLET, FILM COATED ORAL at 20:01

## 2017-06-03 RX ADMIN — CARVEDILOL 6.25 MG: 6.25 TABLET, FILM COATED ORAL at 17:46

## 2017-06-03 RX ADMIN — ONDANSETRON 4 MG: 2 INJECTION INTRAMUSCULAR; INTRAVENOUS at 08:33

## 2017-06-03 RX ADMIN — AMIODARONE HYDROCHLORIDE 400 MG: 200 TABLET ORAL at 08:07

## 2017-06-03 RX ADMIN — INSULIN LISPRO 3 UNITS: 100 INJECTION, SOLUTION INTRAVENOUS; SUBCUTANEOUS at 12:17

## 2017-06-03 RX ADMIN — INSULIN HUMAN 5 UNITS: 100 INJECTION, SUSPENSION SUBCUTANEOUS at 17:48

## 2017-06-03 RX ADMIN — HEPARIN SODIUM 2600 UNITS: 1000 INJECTION, SOLUTION INTRAVENOUS; SUBCUTANEOUS at 21:53

## 2017-06-03 RX ADMIN — INSULIN HUMAN 5 UNITS: 100 INJECTION, SUSPENSION SUBCUTANEOUS at 05:00

## 2017-06-03 RX ADMIN — AMIODARONE HYDROCHLORIDE 400 MG: 200 TABLET ORAL at 19:47

## 2017-06-03 RX ADMIN — INSULIN LISPRO 4 UNITS: 100 INJECTION, SOLUTION INTRAVENOUS; SUBCUTANEOUS at 17:48

## 2017-06-03 RX ADMIN — STANDARDIZED SENNA CONCENTRATE AND DOCUSATE SODIUM 2 TABLET: 8.6; 5 TABLET, FILM COATED ORAL at 08:07

## 2017-06-03 ASSESSMENT — ENCOUNTER SYMPTOMS
EYE DISCHARGE: 0
DIZZINESS: 1
WEAKNESS: 1
PALPITATIONS: 0
EYE REDNESS: 0
HALLUCINATIONS: 0
FLANK PAIN: 0
NERVOUS/ANXIOUS: 0
SEIZURES: 0
CHILLS: 0
VOMITING: 0
ABDOMINAL PAIN: 0
FEVER: 0
BLOOD IN STOOL: 0
FOCAL WEAKNESS: 0
BACK PAIN: 1
NAUSEA: 1
COUGH: 1
FALLS: 0
SPEECH CHANGE: 0
SHORTNESS OF BREATH: 0
HEMOPTYSIS: 0

## 2017-06-03 ASSESSMENT — PAIN SCALES - GENERAL
PAINLEVEL_OUTOF10: 0
PAINLEVEL_OUTOF10: 3
PAINLEVEL_OUTOF10: 5
PAINLEVEL_OUTOF10: 0
PAINLEVEL_OUTOF10: 0
PAINLEVEL_OUTOF10: 3
PAINLEVEL_OUTOF10: 0
PAINLEVEL_OUTOF10: 3
PAINLEVEL_OUTOF10: 0

## 2017-06-03 NOTE — PROGRESS NOTES
Hospital Medicine Interval Note  Date of Service:  6/2/2017    Chief Complaint  66 y.o.-year-old female admitted 5/27/2017 with cardiac arrest.    Interval Problem Update  Tolerated extubation this morning    Consultants/Specialty  Pulmonology  Cardiology  Nephrology      Disposition  ICU.     Review of Systems   Constitutional: Positive for malaise/fatigue. Negative for fever and chills.   HENT: Negative.    Eyes: Negative for discharge and redness.   Respiratory: Positive for cough and shortness of breath.    Cardiovascular: Positive for leg swelling. Negative for chest pain and palpitations.   Gastrointestinal: Negative for nausea, vomiting and abdominal pain.   Genitourinary: Negative for hematuria and flank pain.   Musculoskeletal: Positive for back pain. Negative for falls.   Skin: Negative for rash.   Neurological: Positive for weakness. Negative for speech change, focal weakness and seizures.   Psychiatric/Behavioral: Negative for hallucinations. The patient is not nervous/anxious.       Physical Exam Laboratory/Imaging   Filed Vitals:    06/02/17 1600 06/02/17 1700 06/02/17 1740 06/02/17 1800   BP:       Pulse: 68 67 67 66   Temp:       Resp: 29 27 25 26   Height:       Weight:       SpO2: 96% 96% 98% 97%     Physical Exam   Constitutional: She appears well-developed.   HENT:   Head: Normocephalic and atraumatic.   Right Ear: External ear normal.   Left Ear: External ear normal.   Mouth/Throat: No oropharyngeal exudate.   Eyes: Right eye exhibits no discharge. Left eye exhibits no discharge.   Neck: No JVD present. No tracheal deviation present.   Cardiovascular: Normal rate and regular rhythm.  Exam reveals no friction rub.    No murmur heard.  Pulmonary/Chest: No respiratory distress. She has no decreased breath sounds. She has no wheezes. She has rhonchi. She has rales. She exhibits no tenderness.   Abdominal: Soft. Bowel sounds are normal. She exhibits no distension. There is no tenderness. There is no  rebound.   Musculoskeletal: She exhibits edema. She exhibits no tenderness.   Neurological: She is alert. No cranial nerve deficit. She exhibits normal muscle tone.   Skin: Skin is warm and dry. She is not diaphoretic. No cyanosis. Nails show no clubbing.   Psychiatric: She has a normal mood and affect.    Lab Results   Component Value Date/Time    WBC 7.3 06/02/2017 04:25 AM    HEMOGLOBIN 8.4* 06/02/2017 04:25 AM    HEMATOCRIT 26.2* 06/02/2017 04:25 AM    PLATELET COUNT 152* 06/02/2017 04:25 AM     Lab Results   Component Value Date/Time    SODIUM 134* 06/02/2017 04:25 AM    POTASSIUM 4.2 06/02/2017 04:25 AM    CHLORIDE 95* 06/02/2017 04:25 AM    CO2 29 06/02/2017 04:25 AM    GLUCOSE 204* 06/02/2017 04:25 AM    BUN 40* 06/02/2017 04:25 AM    CREATININE 2.65* 06/02/2017 04:25 AM      Assessment/Plan    Cardiac arrest (CMS-HCC) (present on admission)  Assessment & Plan  With return of spontaneous circulation.   Plan is for angio per above    ESRD (end stage renal disease) (CMS-HCC) (present on admission)  Assessment & Plan  HD MWF per nephrology    Acute respiratory failure with hypoxia (CMS-HCC) (present on admission)  Assessment & Plan  extbated 6-2  RT protocol    Atrial fibrillation (CMS-HCC) (present on admission)  Assessment & Plan  Paroxysmal, continue amiodarone coreg   heparin drip  Cardiology following with plan   for cath if remains stable post extubation    Anemia (present on admission)  Assessment & Plan  Stable continue Procrit       IDDM (insulin dependent diabetes mellitus) (CMS-HCC) (present on admission)  Assessment & Plan  Continue NPH continue ISS monitor cbg's  and adjust accordingly     Dyslipidemia (present on admission)  Assessment & Plan  lipitor    Cardiomyopathy (CMS-HCC) (present on admission)  Assessment & Plan    EF35%    Continue medical management and fluid management with HD  Continue  carvedilol  , consider   ACEI if  BP remains stable      Aspiration pneumonia (CMS-HCC) (present on  admission)  Assessment & Plan  Cx MRSA  On zyvox complete 7 day course  Completed 5 day course ceft flagyl for aspiration Pna         Plan of care reviewed with patient's daughter at bedside  and discussed with nursing staff  Labs reviewed and Medications reviewed  Patricia catheter: Unconscious / Sedated Patient on a Ventilator and Critically Ill - Requiring Accurate Measurement of Urinary Output  Central line in place: Need for access    DVT Prophylaxis: Heparin      Antibiotics: Treating active infection/contamination beyond 24 hours perioperative coverage

## 2017-06-03 NOTE — PROGRESS NOTES
Pulmonary Critical Care Progress Note      Date of service: 6/3/2017    Chief Complaint: Cardiac Arrest    History of Present Illness: Patient is a 67 y/o F, history of end stage renal disease, Right AV fistula, chronic HD, DM, admitted through the ED on 5/27/2017 after out of hospital cardiac arrest asystole with ROSC after two rounds of epi. Intubated remains on full ventilator support but now awake and following commands in Indonesian.     ROS:    Respiratory: unable to perform due to the patient's inability to effectively communicate,   Cardiac: unable to perform due to the patient's inability to effectively communicate,   GI: unable to perform due to the patient's inability to effectively communicate.      Interval Events:  24 hour interval history reviewed   Extubated yesterday - tolerated.  Nausea with emesis yesterday - Zofran with no relief, Compazine with relief. Nausea again this AM, given Zofran.   Alert and oriented, nods head appropriately.    A fib rate controlled. -140 systolic.   Mobilized to edge of chair prior to Dialysis.   3L NC, doing well on IS  CXR with resolved pulmonary edema.   ISS 11 units in the last 24hr, FSBS 140-200.     PFSH:  No change.    Physical Exam  General: awake, alert  Neuro/Psych: , follows commands, NFE.   HEENT: PERRL. Cortrak in place. ET tube in place. Neck supple. Trachea midline. Triple lumed site CDI. Right nare coretrak, Supple, midline, no crepitus. Right IJ CVC CDI  CVS: Distant, regular.   Respiratory: Moderately diminished, clear. No wheezing.   Abdomen/: Mildly distended. Soft. NT. Tolerating tube feed. Bowel sounds present.   Extremities: Distal pulses 2+ bilateral.   Skin: Cool, dry, perfused. Capillary refill intact.     Respiratory:     Pulse Oximetry: 98 %  ImagingAvailable data reviewed   Recent Labs      06/01/17   0450  06/02/17   0533   ISTATAPH  7.491  7.467   ISTATAPCO2  43.6*  36.9   ISTATAPO2  77  113*   ISTATATCO2  35*  28   [id number]  96   99   ISTATARTHCO3  33.3*  26.6*   ISTATARTBE  9*  3   ISTATTEMP  37.5 C  37.1 C   ISTATFIO2  45  45   ISTATSPEC  Arterial  Arterial   ISTATAPHTC  7.484  7.465   [id number]  79  113*     HemoDynamics:  Pulse: 60, Heart Rate (Monitored): (!) 59  NIBP: 136/41 mmHg    Imaging: Available data reviewed     ECHO 5/27:  CONCLUSIONS  No prior study is available for comparison.   Normal left ventricular chamber size. Normal left ventricular wall thickness. Moderately reduced left ventricular systolic function. Left ventricular ejection fraction is visually estimated to be 35%. Normal   regional wall motion.   Grade II diastolic dysfunction.  Moderately dilated left atrium.  Moderate mitral regurgitation.  Estimated right ventricular systolic pressure is at least 45 mmHg.  IVC not visualized.      Neuro:  GCS Total [place] Coma Score: 15  Imaging: Available data reviewed    Fluids:  Intake/Output       06/01/17 0700 - 06/02/17 0659 06/02/17 0700 - 06/03/17 0659 06/03/17 0700 - 06/04/17 0659      [id number] 7949-7351 Total 7868-3946 5713-6846 Total 4255-9607 9902-3778 Total       Intake    I.V.  315  219.4 534.4  151.6  120 271.6  --  -- --    Propofol Volume 95 99.4 194.4 31.6 -- 31.6 -- -- --    IV Volume 120 120 240 120 120 240 -- -- --    IV Piggyback Volume (Albumin 25%) 100 -- 100 -- -- -- -- -- --    Other  --  60 60  120  -- 120  --  -- --    Medications (P.O./ Enteral Liquids) -- 60 60 120 -- 120 -- -- --    Dialysis  500  -- 500  --  -- --  --  -- --    Dialysis Volume (Dialysis Intake) 500 -- 500 -- -- -- -- -- --    Enteral  600  570 1170  660  380 1040  --  -- --    Enteral Volume 480 480 960 500 300 800 -- -- --    Free Water / Tube Flush 120 90 210 160 80 240 -- -- --    Total Intake 1415 849.4 2264.4 931.6 500 1431.6 -- -- --       Output    Urine  225  65 290  40  70 110  --  -- --    Indwelling Cathether 225 65 290 40 70 110 -- -- --    Emesis  --  -- --  --  100 100  --  -- --    Emesis -- -- -- -- 100 100 --  -- --    Dialysis  2050- 2050  --  -- --    Dialysis Output (Dialysis Output) 2050- 2050 -- -- --    Stool  --  -- --  --  -- --  --  -- --    Number of Times Stooled 1 x -- 1 x -- -- -- -- -- --    Total Output 2275 65 2340 2040 170 2210 -- -- --       Net I/O     -860 784.4 -75.6 -1108.4 330 -778.4 -- -- --        Weight: 67.2 kg (148 lb 2.4 oz)  Recent Labs      17   SODIUM  135  134*  135   POTASSIUM  3.9  4.2  4.8   CHLORIDE  93*  95*  94*   CO2  31  29  30   BUN  38*  40*  51*   CREATININE  2.76*  2.65*  3.38*   CALCIUM  9.0  9.3  10.2     GI/Nutrition:  Imaging: Available data reviewed  NPO and tube feed Tolerated at 40.     Liver Function:  Recent Labs      17   0333   GLUCOSE  148*  204*  162*     Heme:  Recent Labs      17   RBC  2.90*  3.06*  3.17*   HEMOGLOBIN  7.9*  8.4*  8.6*   HEMATOCRIT  24.8*  26.2*  27.5*   PLATELETCT  136*  152*  175   APTT  33.0   --    --      Infectious Disease:  Monitored Temp  Av.3 °C (99.1 °F)  Min: 35.7 °C (96.3 °F)  Max: 37.7 °C (99.9 °F)  Micro: reviewed   Day  Zyvox.   Recent Labs      17   WBC  5.9  7.3  8.6   NEUTSPOLYS  62.60  69.90  71.80   LYMPHOCYTES  20.90*  15.20*  13.20*   MONOCYTES  11.30  9.60  8.70   EOSINOPHILS  2.70  1.90  1.40   BASOPHILS  0.30  0.50  0.50     Current Facility-Administered Medications   Medication Dose Frequency Provider Last Rate Last Dose   • prochlorperazine (COMPAZINE) injection 10 mg  10 mg Q6HRS PRN Jeremy M Gonda, M.D.   10 mg at 17 4854   • insulin NPH (HUMULIN,NOVOLIN) injection 5 Units  5 Units BID INSULIN Macario Jo M.D.   5 Units at 17 0500   • albumin human 25% solution 12.5 g  12.5 g Q HOUR PRN Ricardo Petersen M.D.   Stopped at 17 0715   • carvedilol (COREG) tablet 6.25 mg  6.25  mg BID WITH MEALS Carlos Manuel Man M.D.   6.25 mg at 06/03/17 0807   • insulin lispro (HUMALOG) injection 3-14 Units  3-14 Units Q6HRS Carlos Manuel Man M.D.   Stopped at 06/03/17 0600   • linezolid (ZYVOX) tablet 600 mg  600 mg Q12HRS Carlos Manuel Man M.D.   600 mg at 06/03/17 0807   • NS (BOLUS) infusion 250 mL  250 mL DIALYSIS PRN Ricardo Petersen M.D.       • epoetin micheal (EPOGEN,PROCRIT) injection 10,000 Units  10,000 Units MO, WE + FR Ricardo Petersen M.D.   10,000 Units at 06/02/17 0700   • NS (BOLUS) infusion 250 mL  250 mL DIALYSIS PRN Ricardo Petersen M.D.       • heparin 1000 units/mL injection 2,600 Units  2,600 Units PRN Ricardo Castaneda M.D.        And   • heparin infusion 25,000 units in 500 ml 0.45% nacl   Continuous Ricardo Castaneda M.D.   Stopped at 05/31/17 2249   • aspirin EC (ECOTRIN) tablet 81 mg  81 mg DAILY Mulu Bryant M.D.   81 mg at 06/03/17 0807   • atorvastatin (LIPITOR) tablet 40 mg  40 mg QHS Mulu Bryant M.D.   40 mg at 06/02/17 2023   • amiodarone (CORDARONE) tablet 400 mg  400 mg TWICE DAILY Mulu Bryant M.D.   400 mg at 06/03/17 0807   • [START ON 6/5/2017] amiodarone (CORDARONE) tablet 200 mg  200 mg TWICE DAILY Mulu Bryant M.D.       • [START ON 6/13/2017] amiodarone (CORDARONE) tablet 200 mg  200 mg Q DAY Mulu Bryant M.D.       • acetaminophen (TYLENOL) suppository 650 mg  650 mg Q6HRS PRN Dragan Martinez M.D.   650 mg at 05/28/17 0444   • Respiratory Care per Protocol   Continuous RT Dragan Martinez M.D.       • senna-docusate (PERICOLACE or SENOKOT S) 8.6-50 MG per tablet 2 Tab  2 Tab BID Dragan Martinez M.D.   2 Tab at 06/03/17 0807    And   • polyethylene glycol/lytes (MIRALAX) PACKET 1 Packet  1 Packet QDAY PRN Dragan Martinez M.D.   1 Packet at 05/30/17 2121    And   • magnesium hydroxide (MILK OF MAGNESIA) suspension 30 mL  30 mL QDAY PRN Dragan Martinez M.D.        And   • bisacodyl (DULCOLAX) suppository 10 mg  10 mg QDAY PRN Dragan araiza  Alfonso Harman M.D.       • lidocaine (XYLOCAINE) 1%  injection  1-2 mL Q30 MIN PRN Dragan Martinez M.D.   2 mL at 06/02/17 0028   • NS infusion   Continuous Dragan Martinez M.D. 10 mL/hr at 05/30/17 1945     • fentaNYL (SUBLIMAZE) injection 25 mcg  25 mcg Q HOUR PRN Dragan Martinez M.D.   25 mcg at 06/02/17 2142    Or   • fentaNYL (SUBLIMAZE) injection 50 mcg  50 mcg Q HOUR PRN Dragan Martinez M.D.   50 mcg at 05/31/17 2136    Or   • fentaNYL (SUBLIMAZE) injection 100 mcg  100 mcg Q HOUR PRN Dragan Martinez M.D.   100 mcg at 05/27/17 2101   • ipratropium-albuterol (DUONEB) nebulizer solution 3 mL  3 mL Q2HRS PRN (RT) Dragan Martinez M.D.       • famotidine (PEPCID) tablet 20 mg  20 mg DAILY Dragan Martinez M.D.   20 mg at 06/03/17 0807   • enalaprilat (VASOTEC) injection 1.25 mg  1.25 mg Q6HRS PRN ISABEL Richards.O.       • labetalol (NORMODYNE,TRANDATE) injection 10 mg  10 mg Q4HRS PRN ISABEL Richards.O.       • ondansetron (ZOFRAN) syringe/vial injection 4 mg  4 mg Q4HRS PRN ISABEL Richards.O.   4 mg at 06/02/17 2125   • ondansetron (ZOFRAN ODT) dispertab 4 mg  4 mg Q4HRS PRN ISABEL Richards.O.       • acetaminophen (TYLENOL) tablet 650 mg  650 mg Q6HRS PRN ISABEL Richards.O.       • glucose 4 g chewable tablet 16 g  16 g Q15 MIN PRN GERARDO RichardsO.        And   • dextrose 50% (D50W) injection 25 mL  25 mL Q15 MIN PRN GERARDO RichardsO.         This patient's medications have not been reviewed.    Quality  Measures:  Labs reviewed, Medications reviewed and Radiology images reviewed                    Assessment and Plan:  Ventilator-dependent respiratory failure.   - Intubated AM 5/27   - extubated 6/2   - RT protocols  Abnormal CXR - RLL ATX/pna   - zyvox day 5 of 7 for MRSA pneumonia  Status post witnessed out-of-hospital asystolic cardiac arrest.    - Cardiology following- primary  Cardiac arrest/NSTEMI?/valvular heart Dz/myop   - Cath next week; d/w'd  cardiology    - correct lytes  Cardiomyopathy - EF 35%   - Grade II DD  PHTN - mild-moderate RVSP 45   - Secondary to MR? Moderate by ECHO  Encephalopathy   - improved; follows   - speaks Irish; family assists  Anemia with thrombocytopenia.   - Serial lab - transfuse PRN with restrictive threshold  End-stage renal disease, on maintenance hemodialysis.   - Renal following   - HD daily  Diabetes mellitus - glycemic control  Enteral nutrrition - Cortrak  ERP  Mobilize  Prophylaxis   Daily SBTs after dialysis     Resume heparin gtt.     Discussed patient condition and risk of morbidity and/or mortality with Family, RN, RT, Pharmacy, Charge nurse / hot rounds and cardiology and nephrology.    The patient remains critically ill.  Critical care time = 31 minutes in directly providing and coordinating critical care and extensive data review.  No time overlap and excludes procedures.    Frantz VALDEZ (Tad), am scribing for, and in the presence of, Carlos Manuel Man M.D.     Electronically signed by: Frantz Miller (Tad), 6/3/2017    Carlos Manuel VALDEZ M.D.  personally performed the services described in this documentation, as scribed by Frantz Miller in my presence, and it is both accurate and complete.

## 2017-06-03 NOTE — PROGRESS NOTES
Spoke with Maryann ELLIOTT to clarify heparin gtt.  Per Maryann the heparin has been stopped since 5/31 d/t patient have bloody sputum.  Again last night patient had blood tinged sputum, Dr. Man wanted to continue to hold off on restarting the heparin and as well as to not start sub-q heparin.  MD to continue to re-evaluate patients status and will restart prophylaxis heparin when safe.

## 2017-06-03 NOTE — CARE PLAN
Problem: Hyperinflation:  Goal: Prevent or improve atelectasis  Intervention: Instruct incentive spirometry usage  IS use Q4 hours      Intervention: Perform hyperinflation therapy as indicated by assessment  PEP therapy Q4 hours post-extubation

## 2017-06-03 NOTE — RESPIRATORY CARE
COPD EDUCATION by COPD CLINICAL EDUCATOR  6/2/2017 at 5:40 PM by Louise Buckley     Patient reviewed by COPD education team. Patient does not qualify for COPD program.

## 2017-06-03 NOTE — CARE PLAN
Problem: Oxygenation:  Goal: Maintain adequate oxygenation dependent on patient condition  Outcome: PROGRESSING SLOWER THAN EXPECTED    Problem: Hyperinflation:  Goal: Prevent or improve atelectasis  Outcome: PROGRESSING SLOWER THAN EXPECTED  PT IS is 250

## 2017-06-03 NOTE — PROGRESS NOTES
Cardiology brief progress note    Patient now extubated.   Bloody sputum. Heparin gtt held.    Tele reviewed. No A.fib since early yesterday AM.   Continue coreg at current dose and oral amiodarone load.     Will plan for cath tentatively on Monday.   Would like to ensure her bloody sputum has resolved before taking her down. She may need anti-platelet therapy during/after the procedure.     Will peripherally follow.     Mulu Bryant MD  Cardiologist  [facility]

## 2017-06-03 NOTE — CARE PLAN
Problem: Safety  Goal: Free from accidental injury  Outcome: PROGRESSING AS EXPECTED  Fall precautions in place.    Problem: Elimination  Goal: Establishment and Maintenance of regular bowel elimination  Intervention: Pharmacologic bowel management per MD order  Patient nauseous after med administration. Zofran given. Patient tolerating well.

## 2017-06-03 NOTE — PROGRESS NOTES
Hospital Medicine Interval Note  Date of Service:  6/3/2017    Chief Complaint  66 y.o.-year-old female admitted 5/27/2017 with cardiac arrest.    Interval Problem Update  Extubated yesterday, tolerating well, on TF one espisode of emesis last PM after meds  In SR with no recurrence of afib    Consultants/Specialty  Pulmonology  Cardiology  Nephrology      Disposition  ICU.     Review of Systems   Constitutional: Positive for malaise/fatigue. Negative for fever and chills.   HENT: Negative.    Eyes: Negative for discharge and redness.   Respiratory: Positive for cough. Negative for hemoptysis and shortness of breath.    Cardiovascular: Positive for leg swelling. Negative for chest pain and palpitations.   Gastrointestinal: Positive for nausea. Negative for vomiting, abdominal pain and blood in stool.   Genitourinary: Negative for hematuria and flank pain.   Musculoskeletal: Positive for back pain. Negative for falls.   Skin: Negative for rash.   Neurological: Positive for dizziness and weakness. Negative for speech change, focal weakness and seizures.   Psychiatric/Behavioral: Negative for hallucinations. The patient is not nervous/anxious.       Physical Exam Laboratory/Imaging   Filed Vitals:    06/03/17 0600 06/03/17 0616 06/03/17 0800 06/03/17 1049   BP:       Pulse: 61 60  59   Temp:   37 °C (98.6 °F)    Resp: 29 29  24   Height:       Weight: 67.2 kg (148 lb 2.4 oz)      SpO2: 96% 98%  100%     Physical Exam   Constitutional: She appears well-developed.   HENT:   Head: Normocephalic and atraumatic.   Mouth/Throat: No oropharyngeal exudate.   Eyes: Conjunctivae are normal. Right eye exhibits no discharge. Left eye exhibits no discharge.   Neck: No JVD present. No tracheal deviation present.   Cardiovascular: Normal rate and regular rhythm.  Exam reveals no friction rub.    No murmur heard.  Pulmonary/Chest: No stridor. No respiratory distress. She has no decreased breath sounds. She has no wheezes. She has  rales. She exhibits no tenderness.   Abdominal: Soft. Bowel sounds are normal. She exhibits no distension. There is no tenderness. There is no rebound and no guarding.   Musculoskeletal: She exhibits edema. She exhibits no tenderness.   Neurological: She is alert. No cranial nerve deficit. She exhibits normal muscle tone.   Skin: Skin is warm and dry. She is not diaphoretic. No cyanosis. Nails show no clubbing.   Psychiatric: She has a normal mood and affect.    Lab Results   Component Value Date/Time    WBC 8.6 06/03/2017 03:33 AM    HEMOGLOBIN 8.6* 06/03/2017 03:33 AM    HEMATOCRIT 27.5* 06/03/2017 03:33 AM    PLATELET COUNT 175 06/03/2017 03:33 AM     Lab Results   Component Value Date/Time    SODIUM 135 06/03/2017 03:33 AM    POTASSIUM 4.8 06/03/2017 03:33 AM    CHLORIDE 94* 06/03/2017 03:33 AM    CO2 30 06/03/2017 03:33 AM    GLUCOSE 162* 06/03/2017 03:33 AM    BUN 51* 06/03/2017 03:33 AM    CREATININE 3.38* 06/03/2017 03:33 AM      Assessment/Plan    Cardiac arrest (CMS-HCC) (present on admission)  Assessment & Plan  With return of spontaneous circulation.   Plan is for cath on Monday     ESRD (end stage renal disease) (CMS-HCC) (present on admission)  Assessment & Plan  HD MWF per nephrology    Acute respiratory failure with hypoxia (CMS-HCC) (present on admission)  Assessment & Plan  extbated 6-2  Stable monitor    Atrial fibrillation (CMS-HCC) (present on admission)  Assessment & Plan  Paroxysmal, continue amiodarone coreg   heparin drip  Cardiology following with plan   for cath on Monday     Anemia (present on admission)  Assessment & Plan  Stable continue Procrit       IDDM (insulin dependent diabetes mellitus) (CMS-HCC) (present on admission)  Assessment & Plan  Acceptable control, continue  NPH continue ISS monitor cbg's  and adjust accordingly     Dyslipidemia (present on admission)  Assessment & Plan  lipitor    Cardiomyopathy (CMS-HCC) (present on admission)  Assessment & Plan    EF35%    Continue  medical management and fluid management with HD  Continue  carvedilol  , consider   Adding ACEI will defer to cardiology      Aspiration pneumonia (CMS-HCC) (present on admission)  Assessment & Plan  Cx MRSA  On zyvox to complete 7 day course CXR improved  Completed 5 day course ceft flagyl for aspiration Pna           Plan of care reviewed with patient'sson  at bedside  and discussed with nursing staff  Labs reviewed and Medications reviewed  Patricia catheter: Unconscious / Sedated Patient on a Ventilator and Critically Ill - Requiring Accurate Measurement of Urinary Output  Central line in place: Need for access    DVT Prophylaxis: Heparin      Antibiotics: Treating active infection/contamination beyond 24 hours perioperative coverage

## 2017-06-03 NOTE — PROGRESS NOTES
Dr. Gonda notified of pt c/o nausea and frequent dry heaving. No relief from previous dose zofran. Orders received for compazine. Tube feeds remain off at this time.

## 2017-06-03 NOTE — PROGRESS NOTES
Nephrology Progress Note, Adult, Complex               Author: Ricardo Clemenssenstacy   Date & Time created: 6/3/2017  9:28 AM   Interval History:  65 y/o female with ESRD/HD, s/p cardiac arrest  Currently on daily HD, UF   Seen on HD today, tolerating HD  Extubated    Review of Systems:  Review of Systems   Unable to perform ROS: medical condition       Physical Exam:  Physical Exam   Constitutional: She appears well-developed. She appears ill.   HENT:   Head: Normocephalic and atraumatic.   Eyes: Pupils are equal, round, and reactive to light.   Cardiovascular: Normal rate and regular rhythm.  Exam reveals no gallop and no friction rub.    Pulmonary/Chest: She has no wheezes. She has no rales.   Abdominal: Soft. Bowel sounds are normal. She exhibits no distension.   Musculoskeletal: She exhibits no edema.   Nursing note and vitals reviewed.      Labs:  Recent Labs      06/01/17   0450  06/02/17   0533   ISTATAPH  7.491  7.467   ISTATAPCO2  43.6*  36.9   ISTATAPO2  77  113*   ISTATATCO2  35*  28   [id number]  96  99   ISTATARTHCO3  33.3*  26.6*   ISTATARTBE  9*  3   ISTATTEMP  37.5 C  37.1 C   ISTATFIO2  45  45   ISTATSPEC  Arterial  Arterial   ISTATAPHTC  7.484  7.465   [id number]  79  113*         Recent Labs      06/01/17   0330  06/02/17 0425 06/03/17   0333   SODIUM  135  134*  135   POTASSIUM  3.9  4.2  4.8   CHLORIDE  93*  95*  94*   CO2  31  29  30   BUN  38*  40*  51*   CREATININE  2.76*  2.65*  3.38*   CALCIUM  9.0  9.3  10.2     Recent Labs      06/01/17   0330  06/02/17   0425  06/03/17   0333   GLUCOSE  148*  204*  162*     Recent Labs      06/01/17   0330  06/02/17 0425  06/03/17   0333   RBC  2.90*  3.06*  3.17*   HEMOGLOBIN  7.9*  8.4*  8.6*   HEMATOCRIT  24.8*  26.2*  27.5*   PLATELETCT  136*  152*  175   APTT  33.0   --    --      Recent Labs      06/01/17   0330  06/02/17   0425  06/03/17   0333   WBC  5.9  7.3  8.6   NEUTSPOLYS  62.60  69.90  71.80   LYMPHOCYTES  20.90*  15.20*  13.20*    MONOCYTES  11.30  9.60  8.70   EOSINOPHILS  2.70  1.90  1.40   BASOPHILS  0.30  0.50  0.50           Hemodynamics:  No data recorded.  Monitored Temp: 37.2 °C (99 °F)  Pulse  Av.7  Min: 40  Max: 109Heart Rate (Monitored): (!) 59  NIBP: 136/41 mmHg     Respiratory:    Respiration: (!) 29, Pulse Oximetry: 98 %, O2 Daily Delivery Respiratory : Silicone Nasal Cannula     PEP/CPT Method: Positive Airway Pressure Device, Work Of Breathing / Effort: Mild  RUL Breath Sounds: Diminished, RML Breath Sounds: Diminished, RLL Breath Sounds: Diminished, DEV Breath Sounds: Diminished, LLL Breath Sounds: Diminished  Fluids:    Intake/Output Summary (Last 24 hours) at 17 0928  Last data filed at 17 0600   Gross per 24 hour   Intake 1112.25 ml   Output    200 ml   Net 912.25 ml     Weight: 67.2 kg (148 lb 2.4 oz)  GI/Nutrition:  Orders Placed This Encounter   Procedures   • Diet NPO     Standing Status: Standing      Number of Occurrences: 1      Standing Expiration Date:      Order Specific Question:  Restrict to:     Answer:  With Tube Feed [4]     Medical Decision Making, by Problem:  Active Hospital Problems    Diagnosis   • Cardiac arrest (CMS-HCC) [I46.9]   • Atrial fibrillation (CMS-HCC) [I48.91]   • ESRD (end stage renal disease) (CMS-HCC) [N18.6]   • Acute respiratory failure with hypoxia (CMS-HCC) [J96.01]   • HTN (hypertension) [I10]   • IDDM (insulin dependent diabetes mellitus) (CMS-HCC) [E11.9, Z79.4]   • Dyslipidemia [E78.5]   • Cardiomyopathy (CMS-HCC) [I42.9]   • Anemia [D64.9]   • Elevated troponin [R74.8]       Medications reviewed and Labs reviewed                      Assessment and Plan  1.ESRD/HD - Seen on HD, doing well, hold HD tomorrow  2.HTN: BP controlled  3.Electrolytes: well controlled.  4.Anemia: Hgb 8.6  5.S/p cardiac arrest  6.Volume:well controlled with UF/HD    Daily HD continues

## 2017-06-03 NOTE — PROGRESS NOTES
HD treatment today using RUAAVF.SBP dropped in the last hour of treatment,improved post treatment.Net UF removed 2000 ml.Report given to primary Rn.

## 2017-06-04 ENCOUNTER — APPOINTMENT (OUTPATIENT)
Dept: RADIOLOGY | Facility: MEDICAL CENTER | Age: 67
DRG: 264 | End: 2017-06-04
Attending: INTERNAL MEDICINE
Payer: MEDICARE

## 2017-06-04 LAB
ANION GAP SERPL CALC-SCNC: 9 MMOL/L (ref 0–11.9)
APTT PPP: 67.4 SEC (ref 24.7–36)
APTT PPP: 80.2 SEC (ref 24.7–36)
BASOPHILS # BLD AUTO: 0.5 % (ref 0–1.8)
BASOPHILS # BLD: 0.04 K/UL (ref 0–0.12)
BUN SERPL-MCNC: 50 MG/DL (ref 8–22)
CALCIUM SERPL-MCNC: 9.3 MG/DL (ref 8.5–10.5)
CHLORIDE SERPL-SCNC: 92 MMOL/L (ref 96–112)
CO2 SERPL-SCNC: 31 MMOL/L (ref 20–33)
CREAT SERPL-MCNC: 3.35 MG/DL (ref 0.5–1.4)
EOSINOPHIL # BLD AUTO: 0.13 K/UL (ref 0–0.51)
EOSINOPHIL NFR BLD: 1.6 % (ref 0–6.9)
ERYTHROCYTE [DISTWIDTH] IN BLOOD BY AUTOMATED COUNT: 56.2 FL (ref 35.9–50)
GFR SERPL CREATININE-BSD FRML MDRD: 14 ML/MIN/1.73 M 2
GLUCOSE BLD-MCNC: 189 MG/DL (ref 65–99)
GLUCOSE BLD-MCNC: 190 MG/DL (ref 65–99)
GLUCOSE BLD-MCNC: 202 MG/DL (ref 65–99)
GLUCOSE BLD-MCNC: 206 MG/DL (ref 65–99)
GLUCOSE BLD-MCNC: 221 MG/DL (ref 65–99)
GLUCOSE SERPL-MCNC: 250 MG/DL (ref 65–99)
HCT VFR BLD AUTO: 28.3 % (ref 37–47)
HGB BLD-MCNC: 8.8 G/DL (ref 12–16)
IMM GRANULOCYTES # BLD AUTO: 0.27 K/UL (ref 0–0.11)
IMM GRANULOCYTES NFR BLD AUTO: 3.4 % (ref 0–0.9)
LYMPHOCYTES # BLD AUTO: 1.07 K/UL (ref 1–4.8)
LYMPHOCYTES NFR BLD: 13.6 % (ref 22–41)
MCH RBC QN AUTO: 27.2 PG (ref 27–33)
MCHC RBC AUTO-ENTMCNC: 31.1 G/DL (ref 33.6–35)
MCV RBC AUTO: 87.6 FL (ref 81.4–97.8)
MONOCYTES # BLD AUTO: 0.56 K/UL (ref 0–0.85)
MONOCYTES NFR BLD AUTO: 7.1 % (ref 0–13.4)
NEUTROPHILS # BLD AUTO: 5.81 K/UL (ref 2–7.15)
NEUTROPHILS NFR BLD: 73.8 % (ref 44–72)
NRBC # BLD AUTO: 0.03 K/UL
NRBC BLD AUTO-RTO: 0.4 /100 WBC
PLATELET # BLD AUTO: 183 K/UL (ref 164–446)
PMV BLD AUTO: 10 FL (ref 9–12.9)
POTASSIUM SERPL-SCNC: 4.9 MMOL/L (ref 3.6–5.5)
RBC # BLD AUTO: 3.23 M/UL (ref 4.2–5.4)
SODIUM SERPL-SCNC: 132 MMOL/L (ref 135–145)
WBC # BLD AUTO: 7.9 K/UL (ref 4.8–10.8)

## 2017-06-04 PROCEDURE — 700105 HCHG RX REV CODE 258: Performed by: INTERNAL MEDICINE

## 2017-06-04 PROCEDURE — 80048 BASIC METABOLIC PNL TOTAL CA: CPT

## 2017-06-04 PROCEDURE — 700102 HCHG RX REV CODE 250 W/ 637 OVERRIDE(OP): Performed by: INTERNAL MEDICINE

## 2017-06-04 PROCEDURE — 85730 THROMBOPLASTIN TIME PARTIAL: CPT

## 2017-06-04 PROCEDURE — A9270 NON-COVERED ITEM OR SERVICE: HCPCS | Performed by: HOSPITALIST

## 2017-06-04 PROCEDURE — 82962 GLUCOSE BLOOD TEST: CPT | Mod: 91

## 2017-06-04 PROCEDURE — 700111 HCHG RX REV CODE 636 W/ 250 OVERRIDE (IP): Performed by: HOSPITALIST

## 2017-06-04 PROCEDURE — G8997 SWALLOW GOAL STATUS: HCPCS | Mod: CJ

## 2017-06-04 PROCEDURE — 85025 COMPLETE CBC W/AUTO DIFF WBC: CPT

## 2017-06-04 PROCEDURE — A9270 NON-COVERED ITEM OR SERVICE: HCPCS | Performed by: INTERNAL MEDICINE

## 2017-06-04 PROCEDURE — 92610 EVALUATE SWALLOWING FUNCTION: CPT

## 2017-06-04 PROCEDURE — 700111 HCHG RX REV CODE 636 W/ 250 OVERRIDE (IP): Performed by: INTERNAL MEDICINE

## 2017-06-04 PROCEDURE — 94668 MNPJ CHEST WALL SBSQ: CPT

## 2017-06-04 PROCEDURE — 99232 SBSQ HOSP IP/OBS MODERATE 35: CPT | Performed by: HOSPITALIST

## 2017-06-04 PROCEDURE — 700102 HCHG RX REV CODE 250 W/ 637 OVERRIDE(OP): Performed by: HOSPITALIST

## 2017-06-04 PROCEDURE — 71010 DX-CHEST-PORTABLE (1 VIEW): CPT

## 2017-06-04 PROCEDURE — 99291 CRITICAL CARE FIRST HOUR: CPT | Performed by: INTERNAL MEDICINE

## 2017-06-04 PROCEDURE — 770022 HCHG ROOM/CARE - ICU (200)

## 2017-06-04 PROCEDURE — G8996 SWALLOW CURRENT STATUS: HCPCS | Mod: CL

## 2017-06-04 RX ORDER — RISPERIDONE 0.5 MG/1
0.5 TABLET ORAL NIGHTLY PRN
Status: DISCONTINUED | OUTPATIENT
Start: 2017-06-04 | End: 2017-06-06

## 2017-06-04 RX ADMIN — RISPERIDONE 0.5 MG: 0.5 TABLET, FILM COATED ORAL at 20:14

## 2017-06-04 RX ADMIN — INSULIN HUMAN 5 UNITS: 100 INJECTION, SUSPENSION SUBCUTANEOUS at 06:15

## 2017-06-04 RX ADMIN — STANDARDIZED SENNA CONCENTRATE AND DOCUSATE SODIUM 2 TABLET: 8.6; 5 TABLET, FILM COATED ORAL at 07:51

## 2017-06-04 RX ADMIN — INSULIN LISPRO 3 UNITS: 100 INJECTION, SOLUTION INTRAVENOUS; SUBCUTANEOUS at 23:49

## 2017-06-04 RX ADMIN — INSULIN HUMAN 8 UNITS: 100 INJECTION, SUSPENSION SUBCUTANEOUS at 17:35

## 2017-06-04 RX ADMIN — AMIODARONE HYDROCHLORIDE 400 MG: 200 TABLET ORAL at 20:13

## 2017-06-04 RX ADMIN — AMIODARONE HYDROCHLORIDE 400 MG: 200 TABLET ORAL at 07:51

## 2017-06-04 RX ADMIN — PROCHLORPERAZINE EDISYLATE 10 MG: 5 INJECTION INTRAMUSCULAR; INTRAVENOUS at 00:33

## 2017-06-04 RX ADMIN — HEPARIN SODIUM 850 UNITS/HR: 5000 INJECTION, SOLUTION INTRAVENOUS at 16:25

## 2017-06-04 RX ADMIN — LINEZOLID 600 MG: 600 TABLET, FILM COATED ORAL at 07:52

## 2017-06-04 RX ADMIN — LINEZOLID 600 MG: 600 TABLET, FILM COATED ORAL at 20:13

## 2017-06-04 RX ADMIN — INSULIN LISPRO 4 UNITS: 100 INJECTION, SOLUTION INTRAVENOUS; SUBCUTANEOUS at 00:28

## 2017-06-04 RX ADMIN — CARVEDILOL 6.25 MG: 6.25 TABLET, FILM COATED ORAL at 17:30

## 2017-06-04 RX ADMIN — INSULIN LISPRO 4 UNITS: 100 INJECTION, SOLUTION INTRAVENOUS; SUBCUTANEOUS at 11:58

## 2017-06-04 RX ADMIN — INSULIN LISPRO 4 UNITS: 100 INJECTION, SOLUTION INTRAVENOUS; SUBCUTANEOUS at 06:11

## 2017-06-04 RX ADMIN — OMEPRAZOLE 40 MG: 20 CAPSULE, DELAYED RELEASE ORAL at 07:51

## 2017-06-04 RX ADMIN — ASPIRIN 81 MG: 81 TABLET ORAL at 07:51

## 2017-06-04 RX ADMIN — ATORVASTATIN CALCIUM 40 MG: 40 TABLET, FILM COATED ORAL at 20:12

## 2017-06-04 RX ADMIN — PROCHLORPERAZINE EDISYLATE 10 MG: 5 INJECTION INTRAMUSCULAR; INTRAVENOUS at 23:49

## 2017-06-04 RX ADMIN — SODIUM CHLORIDE: 9 INJECTION, SOLUTION INTRAVENOUS at 20:38

## 2017-06-04 RX ADMIN — CARVEDILOL 6.25 MG: 6.25 TABLET, FILM COATED ORAL at 06:09

## 2017-06-04 RX ADMIN — INSULIN LISPRO 3 UNITS: 100 INJECTION, SOLUTION INTRAVENOUS; SUBCUTANEOUS at 17:34

## 2017-06-04 ASSESSMENT — ENCOUNTER SYMPTOMS
FOCAL WEAKNESS: 0
COUGH: 1
FALLS: 0
SEIZURES: 0
WEAKNESS: 1
FEVER: 0
VOMITING: 0
ABDOMINAL PAIN: 0
PALPITATIONS: 0
HEMOPTYSIS: 0
CHILLS: 0
DIZZINESS: 1
SHORTNESS OF BREATH: 0
SPEECH CHANGE: 0
NERVOUS/ANXIOUS: 0
EYE REDNESS: 0
BLOOD IN STOOL: 0
EYE DISCHARGE: 0
BACK PAIN: 1
WHEEZING: 0
HALLUCINATIONS: 1
ORTHOPNEA: 0
NAUSEA: 0
FLANK PAIN: 0

## 2017-06-04 ASSESSMENT — PAIN SCALES - GENERAL
PAINLEVEL_OUTOF10: 0

## 2017-06-04 NOTE — CARE PLAN
Problem: Risk for Impaired Mobility--Activity Intolerance  Goal: Mobilize and/or Transfer Safely with Maximum [place]  Outcome: PROGRESSING SLOWER THAN EXPECTED  Mobilized up to chair. Two person assist. Patient still weak.    Problem: Oxygenation/Respiratory Function  Goal: Patient will Achieve/Maintain Optimum Respiratory Rate/Effort  Outcome: PROGRESSING AS EXPECTED  Patient weaned to 2L NC.

## 2017-06-04 NOTE — PROGRESS NOTES
Pulmonary Critical Care Progress Note      Date of service: 6/4/2017    Chief Complaint: Cardiac Arrest    History of Present Illness: Patient is a 67 y/o F, history of end stage renal disease, Right AV fistula, chronic HD, DM, admitted through the ED on 5/27/2017 after out of hospital cardiac arrest asystole with ROSC after two rounds of epi. Intubated remains on full ventilator support but now awake and following commands in Serbian.     ROS:    Respiratory: unable to perform due to the patient's inability to effectively communicate,   Cardiac: unable to perform due to the patient's inability to effectively communicate,   GI: unable to perform due to the patient's inability to effectively communicate.        Interval Events:  24 hour interval history reviewed   To be seen in cath lab tomorrow.   Alert and oriented.  SR/SB -15 systolic, A fib controled with Heparin gtts.   TF at goal.  2200 cc off with dialysis yesterday  Mobile to chair.   250 mL on IS, on 2L NC.   CXR with nearly clear lung fields, lower lung volumes and mild pulmonary vascular congestion.   192-221 Blood glucose, 15 units SSI.     PFSH:  No change.      Physical Exam  General: Sitting up in chair and conversing with daughter.   Neuro/Psych: Awake and alert, follows commands with family at bedside in Serbian. Moving all extremities symmetrically.   HEENT: PERRL. Right nare cortrak with TF at goal, Supple, midline, no crepitus. Right IJ CVC CDI  CVS: Distant, regular.   Respiratory:  No wheezing. Clear anteriorly.   Abdomen/:  Soft. NT. Tolerating tube feed. Bowel sounds present. Patricia in place.   Extremities: Distal pulses 2+ bilateral. Trace edema.   Skin: Cool, dry, perfused. Capillary refill intact.       Respiratory:     Pulse Oximetry: 98 %  ImagingAvailable data reviewed   Recent Labs      06/02/17   0533   ISTATAPH  7.467   ISTATAPCO2  36.9   ISTATAPO2  113*   ISTATATCO2  28   [id number]  99   ISTATARTHCO3  26.6*   ISTATARTBE  3    ISTATTEMP  37.1 C   ISTATFIO2  45   ISTATSPEC  Arterial   ISTATAPHTC  7.465   [id number]  113*     HemoDynamics:  Pulse: 64, Heart Rate (Monitored): 64  Blood Pressure : 131/48 mmHg, NIBP: 131/48 mmHg    Imaging: Available data reviewed     ECHO 5/27:  CONCLUSIONS  No prior study is available for comparison.   Normal left ventricular chamber size. Normal left ventricular wall thickness. Moderately reduced left ventricular systolic function. Left ventricular ejection fraction is visually estimated to be 35%. Normal   regional wall motion.   Grade II diastolic dysfunction.  Moderately dilated left atrium.  Moderate mitral regurgitation.  Estimated right ventricular systolic pressure is at least 45 mmHg.  IVC not visualized.      Neuro:  GCS Total [place] Coma Score: 15  Imaging: Available data reviewed    Fluids:  Intake/Output       06/02/17 0700 - 06/03/17 0659 06/03/17 0700 - 06/04/17 0659 06/04/17 0700 - 06/05/17 0659      3247-0620 7197-5222 Total 3789-1548 7494-0167 Total 8633-0383 0231-9820 Total       Intake    I.V.  151.6  120 271.6  120  100 220  20  -- 20    Propofol Volume 31.6 -- 31.6 -- -- -- -- -- --    IV Volume 120 120 240 120 100 220 20 -- 20    Other  120  -- 120  120  -- 120  120  -- 120    Medications (P.O./ Enteral Liquids) 120 -- 120 120 -- 120 120 -- 120    Enteral  660  380 1040  880  1000 1880  170  -- 170    Enteral Volume 500 300 800  110 -- 110    Free Water / Tube Flush 160 80 240 240 450 690 60 -- 60    Total Intake 931.6 500 1431.6 1120 1100 2220 310 -- 310       Output    Urine  40  70 110  20  80 100  10  -- 10    Indwelling Cathether 40 70 110 20 80 100 10 -- 10    Emesis  --  100 100  --  -- --  --  -- --    Emesis -- 100 100 -- -- -- -- -- --    Dialysis  2000 -- 2000 2000  -- 2000  --  -- --    Dialysis Output (Dialysis Output) 2000 -- 2000 2000 -- 2000 -- -- --    Total Output 2040 170 2210 2020 80 2100 10 -- 10       Net I/O     -1108.4 330 -778.4 -900 1020 092  300 -- 300        Weight: 67 kg (147 lb 11.3 oz)  Recent Labs      17   0255   SODIUM  134*  135  132*   POTASSIUM  4.2  4.8  4.9   CHLORIDE  95*  94*  92*   CO2  29  30  31   BUN  40*  51*  50*   CREATININE  2.65*  3.38*  3.35*   CALCIUM  9.3  10.2  9.3     GI/Nutrition:  Imaging: Available data reviewed  NPO and tube feed Tolerated at 40.     Liver Function:  Recent Labs      17   025   GLUCOSE  204*  162*  250*     Heme:  Recent Labs      17   025   RBC  3.06*  3.17*   --   3.23*   HEMOGLOBIN  8.4*  8.6*   --   8.8*   HEMATOCRIT  26.2*  27.5*   --   28.3*   PLATELETCT  152*  175   --   183   APTT   --    --   54.1*  80.2*     Infectious Disease:  Temp  Av.3 °C (99.2 °F)  Min: 37 °C (98.6 °F)  Max: 37.5 °C (99.5 °F)  Monitored Temp  Av.3 °C (99.2 °F)  Min: 37 °C (98.6 °F)  Max: 37.7 °C (99.9 °F)  Micro: reviewed   Day  Zyvox.   Recent Labs      17   025   WBC  7.3  8.6  7.9   NEUTSPOLYS  69.90  71.80  73.80*   LYMPHOCYTES  15.20*  13.20*  13.60*   MONOCYTES  9.60  8.70  7.10   EOSINOPHILS  1.90  1.40  1.60   BASOPHILS  0.50  0.50  0.50     Current Facility-Administered Medications   Medication Dose Frequency Provider Last Rate Last Dose   • [START ON 2017] lidocaine-prilocaine (EMLA) 2.5-2.5 % cream   DIALYSIS LUPE Petersen M.D.       • omeprazole 2 mg/mL in sodium bicarbonate (PRILOSEC) oral susp 40 mg  40 mg DAILY Carlos Manuel Man M.D.   40 mg at 17 0751   • oxycodone immediate-release (ROXICODONE) tablet 5 mg  5 mg Q6HRS PRN Carlos Manuel Man M.D.   5 mg at 172   • prochlorperazine (COMPAZINE) injection 10 mg  10 mg Q6HRS PRN Jeremy M Gonda, M.D.   10 mg at 17 0033   • insulin NPH (HUMULIN,NOVOLIN) injection 5 Units  5 Units BID INSULIN Macario Jo M.D.   5 Units at 17 0615    • albumin human 25% solution 12.5 g  12.5 g Q HOUR PRN Ricardo Petersen M.D.   Stopped at 06/02/17 0715   • carvedilol (COREG) tablet 6.25 mg  6.25 mg BID WITH MEALS Carlos Manuel Man M.D.   6.25 mg at 06/04/17 0609   • insulin lispro (HUMALOG) injection 3-14 Units  3-14 Units Q6HRS Carlos Manuel Man M.D.   4 Units at 06/04/17 0611   • linezolid (ZYVOX) tablet 600 mg  600 mg Q12HRS Carlos Manuel Man M.D.   600 mg at 06/04/17 0752   • NS (BOLUS) infusion 250 mL  250 mL DIALYSIS PRN Ricardo Petersen M.D.       • epoetin micheal (EPOGEN,PROCRIT) injection 10,000 Units  10,000 Units MO, WE +  Ricardo Petersen M.D.   10,000 Units at 06/02/17 0700   • NS (BOLUS) infusion 250 mL  250 mL DIALYSIS PRN Ricardo Petersen M.D.       • heparin 1000 units/mL injection 2,600 Units  2,600 Units PRN Ricardo Castaneda M.D.   2,600 Units at 06/03/17 2153    And   • heparin infusion 25,000 units in 500 ml 0.45% nacl   Continuous Ricardo Castaneda M.D. 17 mL/hr at 06/04/17 0652 850 Units/hr at 06/04/17 0652   • aspirin EC (ECOTRIN) tablet 81 mg  81 mg DAILY Mulu Bryant M.D.   81 mg at 06/04/17 0751   • atorvastatin (LIPITOR) tablet 40 mg  40 mg QHS Mulu Bryant M.D.   40 mg at 06/03/17 1947   • amiodarone (CORDARONE) tablet 400 mg  400 mg TWICE DAILY Mulu Bryant M.D.   400 mg at 06/04/17 0751   • [START ON 6/5/2017] amiodarone (CORDARONE) tablet 200 mg  200 mg TWICE DAILY Mulu Bryant M.D.       • [START ON 6/13/2017] amiodarone (CORDARONE) tablet 200 mg  200 mg Q DAY Mulu Bryant M.D.       • acetaminophen (TYLENOL) suppository 650 mg  650 mg Q6HRS PRN Dragan Martinez M.D.   650 mg at 05/28/17 0444   • Respiratory Care per Protocol   Continuous RT Dragan Martinez M.D.       • senna-docusate (PERICOLACE or SENOKOT S) 8.6-50 MG per tablet 2 Tab  2 Tab BID Dragan Martinez M.D.   2 Tab at 06/04/17 0751    And   • polyethylene glycol/lytes (MIRALAX) PACKET 1 Packet  1 Packet QDAY PRN Dragan Martinez M.D.   1 Packet at  05/30/17 2121    And   • magnesium hydroxide (MILK OF MAGNESIA) suspension 30 mL  30 mL QDAY PRN Dragan Martinez M.D.        And   • bisacodyl (DULCOLAX) suppository 10 mg  10 mg QDAY PRN Dragan Martinez M.D.       • lidocaine (XYLOCAINE) 1%  injection  1-2 mL Q30 MIN PRN Dragan Martinez M.D.   2 mL at 06/02/17 0028   • NS infusion   Continuous Dragan Martinez M.D. 10 mL/hr at 05/30/17 1945     • ipratropium-albuterol (DUONEB) nebulizer solution 3 mL  3 mL Q2HRS PRN (RT) Dragan Martinez M.D.       • enalaprilat (VASOTEC) injection 1.25 mg  1.25 mg Q6HRS PRN Dragan Sampson D.O.       • labetalol (NORMODYNE,TRANDATE) injection 10 mg  10 mg Q4HRS PRN Dragan Sampson D.O.       • ondansetron (ZOFRAN) syringe/vial injection 4 mg  4 mg Q4HRS PRN Dragan Sampson D.O.   4 mg at 06/03/17 0833   • ondansetron (ZOFRAN ODT) dispertab 4 mg  4 mg Q4HRS PRN Dragan Sampson D.O.       • acetaminophen (TYLENOL) tablet 650 mg  650 mg Q6HRS PRN Dragan Sampson D.O.   650 mg at 06/03/17 1455   • glucose 4 g chewable tablet 16 g  16 g Q15 MIN PRN Dragan Sampson D.O.        And   • dextrose 50% (D50W) injection 25 mL  25 mL Q15 MIN PRN Dragan Sampson D.O.         This patient's medications have not been reviewed.    Quality  Measures:  Labs reviewed, Medications reviewed and Radiology images reviewed                    Assessment and Plan:  Ventilator-dependent respiratory failure.   - Intubated AM 5/27   - doing well after extubation    - IS, RT protocols  Abnormal CXR - RLL ATX/pna   - zyvox   Status post witnessed out-of-hospital asystolic cardiac arrest.    - Cardiology following- primary  Cardiac arrest/NSTEMI?/valvular heart Dz/myop   - Cath tomorrow    - correct lytes  Cardiomyopathy - EF 35%   - Grade II DD  PHTN - mild-moderate RVSP 45   - Secondary to MR? Moderate by ECHO  Encephalopathy   - improved; follows   - speaks Kiswahili; family assists  Anemia with thrombocytopenia.   - Serial lab -  transfuse PRN with restrictive threshold  End-stage renal disease, on maintenance hemodialysis.   - Renal following   - HD daily  Diabetes mellitus - glycemic control  Enteral nutrrition - Cortrak  ERP  Mobilize  Prophylaxis   Daily SBTs after dialysis     Resume heparin gtt.     Discussed patient condition and risk of morbidity and/or mortality with Family, RN, RT, Pharmacy, Charge nurse / hot rounds and cardiology and nephrology.    The patient remains critically ill.  Critical care time = 31 minutes in directly providing and coordinating critical care and extensive data review.  No time overlap and excludes procedures.    Diamond VALDEZ (Tad), am scribing for, and in the presence of, Carlos Manuel Man M.D.  Electronically signed by: Diamond Scott (Tad), 6/4/2017  Carlos Manuel VALDEZ M.D. personally performed the services described in this documentation, as scribed by Diamond Scott in my presence, and it is both accurate and complete.

## 2017-06-04 NOTE — PROGRESS NOTES
Hospital Medicine Interval Note  Date of Service:  6/4/2017    Chief Complaint  66 y.o.-year-old female admitted 5/27/2017 with cardiac arrest.    Interval Problem Update  Confusion last night with visual hallucinations , oriented and back to baseline this am, daughter at bedside    Consultants/Specialty  Pulmonology  Cardiology  Nephrology      Disposition  ICU.     Review of Systems   Constitutional: Negative for fever and chills.   HENT: Negative.    Eyes: Negative for discharge and redness.   Respiratory: Positive for cough. Negative for hemoptysis, shortness of breath and wheezing.    Cardiovascular: Positive for leg swelling. Negative for chest pain, palpitations and orthopnea.   Gastrointestinal: Negative for nausea, vomiting, abdominal pain and blood in stool.   Genitourinary: Negative for hematuria and flank pain.   Musculoskeletal: Positive for back pain. Negative for falls.   Skin: Negative for rash.   Neurological: Positive for dizziness and weakness (generalized). Negative for speech change, focal weakness and seizures.   Psychiatric/Behavioral: Positive for hallucinations. The patient is not nervous/anxious.       Physical Exam Laboratory/Imaging   Filed Vitals:    06/04/17 0600 06/04/17 0609 06/04/17 0800 06/04/17 0824   BP:  131/48     Pulse: 63 65  64   Temp:   37.3 °C (99.1 °F)    Resp: 27   20   Height:       Weight: 67 kg (147 lb 11.3 oz)      SpO2: 92%   98%     Physical Exam   Constitutional: She appears well-developed.   HENT:   Head: Normocephalic and atraumatic.   Right Ear: External ear normal.   Left Ear: External ear normal.   Mouth/Throat: No oropharyngeal exudate.   Eyes: Conjunctivae are normal. Right eye exhibits no discharge. Left eye exhibits no discharge.   Neck: No JVD present. No tracheal deviation present.   Cardiovascular: Normal rate and regular rhythm.  Exam reveals no friction rub.    No murmur heard.  Pulmonary/Chest: No stridor. No respiratory distress. She has no  decreased breath sounds. She has rales. She exhibits no tenderness.   Abdominal: Soft. Bowel sounds are normal. She exhibits no distension. There is no tenderness. There is no rebound.   Musculoskeletal: She exhibits edema. She exhibits no tenderness.   Neurological: She is alert. No cranial nerve deficit. She exhibits normal muscle tone.   Skin: Skin is warm and dry. She is not diaphoretic. No cyanosis or erythema. Nails show no clubbing.   Psychiatric: She has a normal mood and affect. Her behavior is normal.    Lab Results   Component Value Date/Time    WBC 7.9 06/04/2017 02:55 AM    HEMOGLOBIN 8.8* 06/04/2017 02:55 AM    HEMATOCRIT 28.3* 06/04/2017 02:55 AM    PLATELET COUNT 183 06/04/2017 02:55 AM     Lab Results   Component Value Date/Time    SODIUM 132* 06/04/2017 02:55 AM    POTASSIUM 4.9 06/04/2017 02:55 AM    CHLORIDE 92* 06/04/2017 02:55 AM    CO2 31 06/04/2017 02:55 AM    GLUCOSE 250* 06/04/2017 02:55 AM    BUN 50* 06/04/2017 02:55 AM    CREATININE 3.35* 06/04/2017 02:55 AM      Assessment/Plan    Cardiac arrest (CMS-HCC) (present on admission)  Assessment & Plan  With return of spontaneous circulation.   Plan is for cath on Monday     ESRD (end stage renal disease) (CMS-HCC) (present on admission)  Assessment & Plan  HD MWF per nephrology    Acute respiratory failure with hypoxia (CMS-HCC) (present on admission)  Assessment & Plan  RT protocol  Encourage IS use    Atrial fibrillation (CMS-HCC) (present on admission)  Assessment & Plan  Paroxysmal, continue amiodarone coreg   heparin drip  Cardiology following with plan   for cath tomorrow    Anemia (present on admission)  Assessment & Plan  Hg 8.8 Stable   continue Procrit       IDDM (insulin dependent diabetes mellitus) (CMS-HCC) (present on admission)  Assessment & Plan  Increase  NPH continue ISS monitor cbg's  and adjust accordingly     Dyslipidemia (present on admission)  Assessment & Plan  lipitor    Cardiomyopathy (CMS-HCC) (present on  admission)  Assessment & Plan    EF35%    Continue medical management and fluid management with HD  Continue  carvedilol  , consider   Adding ACEI will defer to cardiology      Aspiration pneumonia (CMS-HCC) (present on admission)  Assessment & Plan  Cx MRSA  On zyvox day 6/7   CXR improvedan d clinically improved   Completed 5 day course ceft flagyl for aspiration Pna         Delirium  Assessment & Plan  Low dose risperdal qhs prn      Plan of care reviewed with patient and daughter   at bedside  and discussed with nursing staff  Labs reviewed and Medications reviewed  Patricia catheter: Unconscious / Sedated Patient on a Ventilator and Critically Ill - Requiring Accurate Measurement of Urinary Output  Central line in place: Need for access    DVT Prophylaxis: Heparin      Antibiotics: Treating active infection/contamination beyond 24 hours perioperative coverage

## 2017-06-04 NOTE — PROGRESS NOTES
Nephrology Progress Note, Adult, Complex               Author: Ricardo Clemenssenstacy   Date & Time created: 6/4/2017  2:33 PM   Interval History:  65 y/o female with ESRD/HD, s/p cardiac arrest  Extubated, discussed with patient via family  Does not feel SOB, no pain    Review of Systems:  Review of Systems   Respiratory: Negative for shortness of breath.    Cardiovascular: Negative for chest pain.       Physical Exam:  Physical Exam   Constitutional: She appears well-developed. She appears ill.   HENT:   Head: Normocephalic and atraumatic.   Eyes: Pupils are equal, round, and reactive to light.   Cardiovascular: Normal rate and regular rhythm.  Exam reveals no gallop and no friction rub.    Pulmonary/Chest: She has no wheezes. She has no rales.   Abdominal: Soft. Bowel sounds are normal. She exhibits no distension.   Musculoskeletal: She exhibits no edema.   Nursing note and vitals reviewed.      Labs:  Recent Labs      06/02/17   0533   ISTATAPH  7.467   ISTATAPCO2  36.9   ISTATAPO2  113*   ISTATATCO2  28   [id number]  99   ISTATARTHCO3  26.6*   ISTATARTBE  3   ISTATTEMP  37.1 C   ISTATFIO2  45   ISTATSPEC  Arterial   ISTATAPHTC  7.465   [id number]  113*         Recent Labs      06/02/17 0425 06/03/17 0333 06/04/17   0255   SODIUM  134*  135  132*   POTASSIUM  4.2  4.8  4.9   CHLORIDE  95*  94*  92*   CO2  29  30  31   BUN  40*  51*  50*   CREATININE  2.65*  3.38*  3.35*   CALCIUM  9.3  10.2  9.3     Recent Labs      06/02/17 0425 06/03/17 0333  06/04/17   0255   GLUCOSE  204*  162*  250*     Recent Labs      06/02/17 0425 06/03/17   0333  06/03/17 2012 06/04/17   0255  06/04/17   1155   RBC  3.06*  3.17*   --   3.23*   --    HEMOGLOBIN  8.4*  8.6*   --   8.8*   --    HEMATOCRIT  26.2*  27.5*   --   28.3*   --    PLATELETCT  152*  175   --   183   --    APTT   --    --   54.1*  80.2*  67.4*     Recent Labs      06/02/17   0425  06/03/17   0333  06/04/17   0255   WBC  7.3  8.6  7.9   NEUTSPOLYS  69.90   71.80  73.80*   LYMPHOCYTES  15.20*  13.20*  13.60*   MONOCYTES  9.60  8.70  7.10   EOSINOPHILS  1.90  1.40  1.60   BASOPHILS  0.50  0.50  0.50           Hemodynamics:  Temp (24hrs), Av.4 °C (99.3 °F), Min:37.3 °C (99.1 °F), Max:37.5 °C (99.5 °F)  Temperature: 37.3 °C (99.1 °F), Monitored Temp: 37.1 °C (98.8 °F)  Pulse  Av.2  Min: 40  Max: 109Heart Rate (Monitored): 64  Blood Pressure : 131/48 mmHg, NIBP: 131/48 mmHg     Respiratory:    Respiration: 20, Pulse Oximetry: 98 %, O2 Daily Delivery Respiratory : Silicone Nasal Cannula     PEP/CPT Method: Positive Airway Pressure Device, Work Of Breathing / Effort: Mild  RUL Breath Sounds: Diminished, RML Breath Sounds: Diminished, RLL Breath Sounds: Diminished, DEV Breath Sounds: Diminished, LLL Breath Sounds: Diminished  Fluids:    Intake/Output Summary (Last 24 hours) at 17 1433  Last data filed at 17 0800   Gross per 24 hour   Intake   1790 ml   Output     90 ml   Net   1700 ml     Weight: 67 kg (147 lb 11.3 oz)  GI/Nutrition:  Orders Placed This Encounter   Procedures   • Diet NPO     Standing Status: Standing      Number of Occurrences: 1      Standing Expiration Date:      Order Specific Question:  Restrict to:     Answer:  With Tube Feed [4]   • DIET NPO     Standing Status: Standing      Number of Occurrences: 8      Standing Expiration Date:      Order Specific Question:  Restrict to:     Answer:  Sips with Medications [3]      Comments:  if swallow study not pased, then strict NPO. thanks.      Medical Decision Making, by Problem:  Active Hospital Problems    Diagnosis   • Cardiac arrest (CMS-[facility]) [I46.9]   • Atrial fibrillation (CMS-[facility]) [I48.91]   • ESRD (end stage renal disease) (CMS-[facility]) [N18.6]   • Acute respiratory failure with hypoxia (CMS-HCC) [J96.01]   • HTN (hypertension) [I10]   • IDDM (insulin dependent diabetes mellitus) (CMS-HCC) [E11.9, Z79.4]   • Dyslipidemia [E78.5]   • Cardiomyopathy (CMS-[facility]) [I42.9]   • Anemia [D64.9]    • Elevated troponin [R74.8]       Medications reviewed and Labs reviewed                      Assessment and Plan  1.ESRD/HD - HD next tomorrow, MWF schedule  2.HTN: BP controlled  3.Electrolytes: well controlled.  4.Anemia: Hgb 8.8  5.S/p cardiac arrest  6.Volume:well controlled with UF/HD    HD MWF

## 2017-06-04 NOTE — THERAPY
"  Speech Language Therapy Clinical Swallow Evaluation completed.  Functional Status: Patient was seen for a clinical bedside swallow evaluation this date. Patient was upright in chair,  and RN present throughout assessment. Patients  present to interpret. Patient with decreased RICH and required stimulation throughout to sustain RICH. PO trails of ice, thins, nectars, and purees were consumed. Patient demonstrated prolonged oral stage, delayed onset of swallow, decreased laryngeal elevation and hyolaryngeal excursion with tongue pumping noted and 2-3 swallows to clear. Patient initially tolerated trials with no changes in vocal quality or throat clearing/coughing, However as trials progressed and patient demonstrated fatigue, delayed coughing was noted with trials of purees. Patient was able to follow directives with some delay and responded well to visual cues. Patient is not at a level for full PO intake at this time and recommend continue NPO with Cortrak. SLP to continue to follow for dysphagia tx and pre feeding trials of NTFL trial tray with SLP. Discussed with RN and education provided to family.   Recommendations - Diet: Diet / Liquid Recommendation: NPO, Pre-Feeding Trials with SLP Only                          Strategies: to be assessed                          Medication Administration: Medication Administration : Via Gastric Tube  Plan of Care: Will benefit from Speech Therapy 3 times per week  Post-Acute Therapy: Discharge to a transitional care facility for continued skilled therapy services. and Discharge to home with outpatient or home health for additional skilled therapy services.    See \"Rehab Therapy-Acute\" Patient Summary Report for complete documentation.   "

## 2017-06-04 NOTE — CARE PLAN
Problem: Hyperinflation:  Goal: Prevent or improve atelectasis  Outcome: PROGRESSING AS EXPECTED  Intervention: Instruct incentive spirometry usage  Pt reaching 250 on IS but having a hard time making a tight seal.

## 2017-06-04 NOTE — PROGRESS NOTES
Cardiology Progress Note               Author: Mulu Bryant Date & Time created: 2017  9:28am     67 y/o female with HTN, DM, ESRD on HD who was brought in after cardiac arrest currently intubated. New diagnosis of cardiomyopathy (EF 35%)    Interval History:  Pt now extubated.   No recurrent blood in sputum per RN.    at bedside. Limited history due to language barrier.     Tele with NSR. No recurrent A.fib.     Review of Systems   Unable to perform ROS: language     Physical Exam   Constitutional: No distress.   Cardiovascular: Normal rate, regular rhythm, normal heart sounds and intact distal pulses.  Exam reveals no gallop and no friction rub.    No murmur heard.  Pulmonary/Chest: Breath sounds normal. She has no wheezes. She has no rales.   Abdominal: Soft. She exhibits no distension.   Musculoskeletal: She exhibits no edema.   Skin: Skin is warm and dry. She is not diaphoretic.   Nursing note and vitals reviewed.      Hemodynamics:  Temp (24hrs), Av.3 °C (99.2 °F), Min:37 °C (98.6 °F), Max:37.5 °C (99.5 °F)  Temperature: 37.4 °C (99.3 °F), Monitored Temp: 37.1 °C (98.8 °F)  Pulse  Av.2  Min: 40  Max: 109Heart Rate (Monitored): 64  Blood Pressure : 131/48 mmHg, NIBP: 131/48 mmHg     Respiratory:    Respiration: 20, Pulse Oximetry: 98 %, O2 Daily Delivery Respiratory : Silicone Nasal Cannula     PEP/CPT Method: Positive Airway Pressure Device, Work Of Breathing / Effort: Mild  RUL Breath Sounds: Diminished, RML Breath Sounds: Diminished, RLL Breath Sounds: Diminished, DEV Breath Sounds: Diminished, LLL Breath Sounds: Diminished  Fluids:  Date 17 0700 - 17 0659   Shift 0710-8596 8470-8304 3116-3036 24 Hour Total   I  N  T  A  K  E   I.V. 145.8 191.4  337.2    Other 210 60  270    Dialysis  600  600    Enteral 175 180  355    Shift Total 530.8 1031.4  1562.2   O  U  T  P  U  T   Urine  275  275    Dialysis  1600  1600    Shift Total  1875  1875   Weight (kg) 71.4 71.4 71.4 71.4        Weight: 67 kg (147 lb 11.3 oz)  GI/Nutrition:  Orders Placed This Encounter   Procedures   • Diet NPO     Standing Status: Standing      Number of Occurrences: 1      Standing Expiration Date:      Order Specific Question:  Restrict to:     Answer:  With Tube Feed [4]     Lab Results:  Recent Labs      06/02/17 0425 06/03/17 0333 06/04/17   0255   WBC  7.3  8.6  7.9   RBC  3.06*  3.17*  3.23*   HEMOGLOBIN  8.4*  8.6*  8.8*   HEMATOCRIT  26.2*  27.5*  28.3*   MCV  85.6  86.8  87.6   MCH  27.5  27.1  27.2   MCHC  32.1*  31.3*  31.1*   RDW  56.3*  56.6*  56.2*   PLATELETCT  152*  175  183   MPV  10.3  10.0  10.0     Recent Labs      06/02/17 0425 06/03/17 0333 06/04/17   0255   SODIUM  134*  135  132*   POTASSIUM  4.2  4.8  4.9   CHLORIDE  95*  94*  92*   CO2  29  30  31   GLUCOSE  204*  162*  250*   BUN  40*  51*  50*   CREATININE  2.65*  3.38*  3.35*   CALCIUM  9.3  10.2  9.3     Recent Labs      06/03/17 2012 06/04/17   0255   APTT  54.1*  80.2*                 TSH normal    Echo  CONCLUSIONS  No prior study is available for comparison.   Normal left ventricular chamber size. Normal left ventricular wall   thickness. Moderately reduced left ventricular systolic function. Left   ventricular ejection fraction is visually estimated to be 35%. Normal   regional wall motion. Grade II diastolic dysfunction.  Moderately dilated left atrium.  Moderate mitral regurgitation.  Estimated right ventricular systolic pressure is at least 45 mmHg.  IVC not visualized.      Medical Decision Making, by Problem:  Active Hospital Problems    Diagnosis   • Cardiac arrest (CMS-[facility]) [I46.9]   • Atrial fibrillation (CMS-[facility]) [I48.91]   • ESRD (end stage renal disease) (CMS-[facility]) [N18.6]   • Acute respiratory failure with hypoxia (CMS-[facility]) [J96.01]   • Aspiration pneumonia (CMS-[facility]) [J69.0]   • HTN (hypertension) [I10]   • IDDM (insulin dependent diabetes mellitus) (CMS-[facility]) [E11.9, Z79.4]   • Dyslipidemia [E78.5]   •  Cardiomyopathy (CMS-HCC) [I42.9]   • Anemia [D64.9]   • Elevated troponin [R74.8]       Plan:    Cardiac arrest  Cardiomyopathy  NSTEMI (type 1 vs 2 in the setting of cardiac arrest)    Now extubated. No recurrent blood in sputum.   Plan for cardiac catheterization tomorrow. NPO p MN.   Continue ASA, statin and coreg.    P. A.fib - no recurrence in past 24 hours. rate controlled when she does go into A.fib. On coreg. Continue oral amiodarone load. On heparin gtt for anticoagulation.    Thank you for allowing me to participate in the care of this patient. Please do not hesitate to contact me with any questions.    Mulu Bryant MD  Cardiologist  [facility]    Core Measures

## 2017-06-05 ENCOUNTER — APPOINTMENT (OUTPATIENT)
Dept: RADIOLOGY | Facility: MEDICAL CENTER | Age: 67
DRG: 264 | End: 2017-06-05
Attending: INTERNAL MEDICINE
Payer: MEDICARE

## 2017-06-05 LAB
ALBUMIN SERPL BCP-MCNC: 3.6 G/DL (ref 3.2–4.9)
ALBUMIN/GLOB SERPL: 1.1 G/DL
ALP SERPL-CCNC: 84 U/L (ref 30–99)
ALT SERPL-CCNC: 30 U/L (ref 2–50)
ANION GAP SERPL CALC-SCNC: 11 MMOL/L (ref 0–11.9)
APTT PPP: 62 SEC (ref 24.7–36)
AST SERPL-CCNC: 20 U/L (ref 12–45)
BASOPHILS # BLD AUTO: 0.3 % (ref 0–1.8)
BASOPHILS # BLD: 0.02 K/UL (ref 0–0.12)
BILIRUB SERPL-MCNC: 0.7 MG/DL (ref 0.1–1.5)
BUN SERPL-MCNC: 74 MG/DL (ref 8–22)
CALCIUM SERPL-MCNC: 9.5 MG/DL (ref 8.5–10.5)
CHLORIDE SERPL-SCNC: 88 MMOL/L (ref 96–112)
CO2 SERPL-SCNC: 30 MMOL/L (ref 20–33)
CREAT SERPL-MCNC: 4.88 MG/DL (ref 0.5–1.4)
CRP SERPL HS-MCNC: 0.85 MG/DL (ref 0–0.75)
EOSINOPHIL # BLD AUTO: 0.13 K/UL (ref 0–0.51)
EOSINOPHIL NFR BLD: 1.6 % (ref 0–6.9)
ERYTHROCYTE [DISTWIDTH] IN BLOOD BY AUTOMATED COUNT: 54.6 FL (ref 35.9–50)
GFR SERPL CREATININE-BSD FRML MDRD: 9 ML/MIN/1.73 M 2
GLOBULIN SER CALC-MCNC: 3.2 G/DL (ref 1.9–3.5)
GLUCOSE BLD-MCNC: 109 MG/DL (ref 65–99)
GLUCOSE BLD-MCNC: 137 MG/DL (ref 65–99)
GLUCOSE BLD-MCNC: 156 MG/DL (ref 65–99)
GLUCOSE SERPL-MCNC: 105 MG/DL (ref 65–99)
HCT VFR BLD AUTO: 25.6 % (ref 37–47)
HGB BLD-MCNC: 8.1 G/DL (ref 12–16)
IMM GRANULOCYTES # BLD AUTO: 0.19 K/UL (ref 0–0.11)
IMM GRANULOCYTES NFR BLD AUTO: 2.4 % (ref 0–0.9)
LYMPHOCYTES # BLD AUTO: 1.72 K/UL (ref 1–4.8)
LYMPHOCYTES NFR BLD: 21.8 % (ref 22–41)
MCH RBC QN AUTO: 27.2 PG (ref 27–33)
MCHC RBC AUTO-ENTMCNC: 31.6 G/DL (ref 33.6–35)
MCV RBC AUTO: 85.9 FL (ref 81.4–97.8)
MONOCYTES # BLD AUTO: 0.67 K/UL (ref 0–0.85)
MONOCYTES NFR BLD AUTO: 8.5 % (ref 0–13.4)
NEUTROPHILS # BLD AUTO: 5.17 K/UL (ref 2–7.15)
NEUTROPHILS NFR BLD: 65.4 % (ref 44–72)
NRBC # BLD AUTO: 0.03 K/UL
NRBC BLD AUTO-RTO: 0.4 /100 WBC
PLATELET # BLD AUTO: 191 K/UL (ref 164–446)
PMV BLD AUTO: 10.5 FL (ref 9–12.9)
POTASSIUM SERPL-SCNC: 4.7 MMOL/L (ref 3.6–5.5)
PREALB SERPL-MCNC: 23 MG/DL (ref 18–38)
PROT SERPL-MCNC: 6.8 G/DL (ref 6–8.2)
RBC # BLD AUTO: 2.98 M/UL (ref 4.2–5.4)
SODIUM SERPL-SCNC: 129 MMOL/L (ref 135–145)
WBC # BLD AUTO: 7.9 K/UL (ref 4.8–10.8)

## 2017-06-05 PROCEDURE — 700102 HCHG RX REV CODE 250 W/ 637 OVERRIDE(OP): Performed by: HOSPITALIST

## 2017-06-05 PROCEDURE — 71010 DX-CHEST-PORTABLE (1 VIEW): CPT

## 2017-06-05 PROCEDURE — 700101 HCHG RX REV CODE 250

## 2017-06-05 PROCEDURE — 99152 MOD SED SAME PHYS/QHP 5/>YRS: CPT

## 2017-06-05 PROCEDURE — 99232 SBSQ HOSP IP/OBS MODERATE 35: CPT | Performed by: HOSPITALIST

## 2017-06-05 PROCEDURE — 700111 HCHG RX REV CODE 636 W/ 250 OVERRIDE (IP): Performed by: HOSPITALIST

## 2017-06-05 PROCEDURE — 99291 CRITICAL CARE FIRST HOUR: CPT | Performed by: INTERNAL MEDICINE

## 2017-06-05 PROCEDURE — 99153 MOD SED SAME PHYS/QHP EA: CPT

## 2017-06-05 PROCEDURE — A9270 NON-COVERED ITEM OR SERVICE: HCPCS | Performed by: INTERNAL MEDICINE

## 2017-06-05 PROCEDURE — 700111 HCHG RX REV CODE 636 W/ 250 OVERRIDE (IP): Performed by: INTERNAL MEDICINE

## 2017-06-05 PROCEDURE — 80053 COMPREHEN METABOLIC PANEL: CPT

## 2017-06-05 PROCEDURE — 94668 MNPJ CHEST WALL SBSQ: CPT

## 2017-06-05 PROCEDURE — 84134 ASSAY OF PREALBUMIN: CPT

## 2017-06-05 PROCEDURE — 700102 HCHG RX REV CODE 250 W/ 637 OVERRIDE(OP): Performed by: INTERNAL MEDICINE

## 2017-06-05 PROCEDURE — 86140 C-REACTIVE PROTEIN: CPT

## 2017-06-05 PROCEDURE — 85025 COMPLETE CBC W/AUTO DIFF WBC: CPT

## 2017-06-05 PROCEDURE — 307093 HCHG TR BAND RADIAL

## 2017-06-05 PROCEDURE — 700111 HCHG RX REV CODE 636 W/ 250 OVERRIDE (IP)

## 2017-06-05 PROCEDURE — C1769 GUIDE WIRE: HCPCS

## 2017-06-05 PROCEDURE — 85730 THROMBOPLASTIN TIME PARTIAL: CPT

## 2017-06-05 PROCEDURE — 93454 CORONARY ARTERY ANGIO S&I: CPT

## 2017-06-05 PROCEDURE — 82962 GLUCOSE BLOOD TEST: CPT

## 2017-06-05 PROCEDURE — 770022 HCHG ROOM/CARE - ICU (200)

## 2017-06-05 PROCEDURE — 360979 HCHG DIAGNOSTIC CATH

## 2017-06-05 PROCEDURE — B2111ZZ FLUOROSCOPY OF MULTIPLE CORONARY ARTERIES USING LOW OSMOLAR CONTRAST: ICD-10-PCS | Performed by: INTERNAL MEDICINE

## 2017-06-05 PROCEDURE — A9270 NON-COVERED ITEM OR SERVICE: HCPCS | Performed by: HOSPITALIST

## 2017-06-05 PROCEDURE — 90935 HEMODIALYSIS ONE EVALUATION: CPT

## 2017-06-05 PROCEDURE — C1894 INTRO/SHEATH, NON-LASER: HCPCS

## 2017-06-05 PROCEDURE — C1892 INTRO/SHEATH,FIXED,PEEL-AWAY: HCPCS

## 2017-06-05 RX ORDER — LIDOCAINE HYDROCHLORIDE 20 MG/ML
INJECTION, SOLUTION INFILTRATION; PERINEURAL
Status: COMPLETED
Start: 2017-06-05 | End: 2017-06-05

## 2017-06-05 RX ORDER — MIDAZOLAM HYDROCHLORIDE 1 MG/ML
INJECTION INTRAMUSCULAR; INTRAVENOUS
Status: COMPLETED
Start: 2017-06-05 | End: 2017-06-05

## 2017-06-05 RX ORDER — VERAPAMIL HYDROCHLORIDE 2.5 MG/ML
INJECTION, SOLUTION INTRAVENOUS
Status: COMPLETED
Start: 2017-06-05 | End: 2017-06-05

## 2017-06-05 RX ORDER — HEPARIN SODIUM,PORCINE 1000/ML
VIAL (ML) INJECTION
Status: COMPLETED
Start: 2017-06-05 | End: 2017-06-05

## 2017-06-05 RX ADMIN — LINEZOLID 600 MG: 600 TABLET, FILM COATED ORAL at 08:14

## 2017-06-05 RX ADMIN — HEPARIN SODIUM: 1000 INJECTION, SOLUTION INTRAVENOUS; SUBCUTANEOUS at 15:36

## 2017-06-05 RX ADMIN — AMIODARONE HYDROCHLORIDE 200 MG: 200 TABLET ORAL at 20:47

## 2017-06-05 RX ADMIN — OMEPRAZOLE 40 MG: 20 CAPSULE, DELAYED RELEASE ORAL at 08:14

## 2017-06-05 RX ADMIN — LIDOCAINE HYDROCHLORIDE: 20 INJECTION, SOLUTION INFILTRATION; PERINEURAL at 15:35

## 2017-06-05 RX ADMIN — ASPIRIN 81 MG: 81 TABLET ORAL at 08:14

## 2017-06-05 RX ADMIN — NITROGLYCERIN 10 ML: 20 INJECTION INTRAVENOUS at 15:36

## 2017-06-05 RX ADMIN — LINEZOLID 600 MG: 600 TABLET, FILM COATED ORAL at 20:47

## 2017-06-05 RX ADMIN — RISPERIDONE 0.5 MG: 0.5 TABLET, FILM COATED ORAL at 20:48

## 2017-06-05 RX ADMIN — AMIODARONE HYDROCHLORIDE 400 MG: 200 TABLET ORAL at 08:14

## 2017-06-05 RX ADMIN — ONDANSETRON 4 MG: 2 INJECTION INTRAMUSCULAR; INTRAVENOUS at 12:56

## 2017-06-05 RX ADMIN — ATORVASTATIN CALCIUM 40 MG: 40 TABLET, FILM COATED ORAL at 20:47

## 2017-06-05 RX ADMIN — HEPARIN SODIUM 850 UNITS/HR: 5000 INJECTION, SOLUTION INTRAVENOUS at 18:37

## 2017-06-05 RX ADMIN — ERYTHROPOIETIN 10000 UNITS: 10000 INJECTION, SOLUTION INTRAVENOUS; SUBCUTANEOUS at 19:30

## 2017-06-05 RX ADMIN — HEPARIN SODIUM 2000 UNITS: 200 INJECTION, SOLUTION INTRAVENOUS at 15:35

## 2017-06-05 RX ADMIN — FENTANYL CITRATE 50 MCG: 50 INJECTION, SOLUTION INTRAMUSCULAR; INTRAVENOUS at 16:26

## 2017-06-05 RX ADMIN — VERAPAMIL HYDROCHLORIDE 5 MG: 2.5 INJECTION, SOLUTION INTRAVENOUS at 15:35

## 2017-06-05 RX ADMIN — CARVEDILOL 6.25 MG: 6.25 TABLET, FILM COATED ORAL at 16:59

## 2017-06-05 RX ADMIN — MIDAZOLAM 1 MG: 1 INJECTION INTRAMUSCULAR; INTRAVENOUS at 16:26

## 2017-06-05 RX ADMIN — OXYCODONE HYDROCHLORIDE 5 MG: 5 TABLET ORAL at 10:46

## 2017-06-05 RX ADMIN — CARVEDILOL 6.25 MG: 6.25 TABLET, FILM COATED ORAL at 08:14

## 2017-06-05 ASSESSMENT — ENCOUNTER SYMPTOMS
GASTROINTESTINAL NEGATIVE: 1
CLAUDICATION: 0
SEIZURES: 0
BACK PAIN: 1
DIZZINESS: 0
FLANK PAIN: 0
FEVER: 0
FOCAL WEAKNESS: 0
PND: 0
SPUTUM PRODUCTION: 0
VOMITING: 0
SHORTNESS OF BREATH: 0
CHILLS: 0
RESPIRATORY NEGATIVE: 1
SORE THROAT: 0
HALLUCINATIONS: 0
COUGH: 0
STRIDOR: 0
EYES NEGATIVE: 1
CARDIOVASCULAR NEGATIVE: 1
BRUISES/BLEEDS EASILY: 0
COUGH: 1
WEAKNESS: 1
WHEEZING: 0
PALPITATIONS: 0
WEIGHT LOSS: 0
NAUSEA: 0
EYE REDNESS: 0
ABDOMINAL PAIN: 0
HEMOPTYSIS: 0
EYE DISCHARGE: 0
MYALGIAS: 0
ORTHOPNEA: 0
NEUROLOGICAL NEGATIVE: 1
CONSTITUTIONAL NEGATIVE: 1
WEAKNESS: 0
NERVOUS/ANXIOUS: 0
LOSS OF CONSCIOUSNESS: 0
MUSCULOSKELETAL NEGATIVE: 1
FALLS: 0
SPEECH CHANGE: 0

## 2017-06-05 ASSESSMENT — PAIN SCALES - GENERAL
PAINLEVEL_OUTOF10: 5
PAINLEVEL_OUTOF10: 0
PAINLEVEL_OUTOF10: 1
PAINLEVEL_OUTOF10: 0

## 2017-06-05 ASSESSMENT — COPD QUESTIONNAIRES
COPD SCREENING SCORE: 4
DO YOU EVER COUGH UP ANY MUCUS OR PHLEGM?: NO/ONLY WITH OCCASIONAL COLDS OR INFECTIONS
DURING THE PAST 4 WEEKS HOW MUCH DID YOU FEEL SHORT OF BREATH: SOME OF THE TIME
HAVE YOU SMOKED AT LEAST 100 CIGARETTES IN YOUR ENTIRE LIFE: NO/DON'T KNOW

## 2017-06-05 ASSESSMENT — LIFESTYLE VARIABLES: DO YOU DRINK ALCOHOL: NO

## 2017-06-05 NOTE — PROGRESS NOTES
Dr. Anderson called and notified pt currently on heparin drip @ 850 units/hr for anticoagulation. PTT therapeutic. Scheduled for heart cath in AM. Clarified whether to stop heparin drip in AM or continue drip. Order to stop heparin drip @ 0400 AM 6/5/17.

## 2017-06-05 NOTE — PROGRESS NOTES
Monitor summary: In/out of A-fib and sinus rhytm. A-fib rate controlled 80's. Currently Sinus rhythm. IA 0.20, QRS 0.08, QT 0.44

## 2017-06-05 NOTE — PROGRESS NOTES
Nephrology Progress Note, Adult, Complex               Author: Fadi Najjar   Date & Time created: 6/5/2017  2:17 PM   Interval History:  67 y/o female with ESRD/HD, s/p cardiac arrest  Extubated, discussed with patient via family  Events last 24h noted    Review of Systems:  Review of Systems   Constitutional: Negative for fever and weight loss.   Respiratory: Negative for cough and shortness of breath.    Cardiovascular: Negative for chest pain and leg swelling.   Gastrointestinal: Negative for nausea and vomiting.   Genitourinary: Negative for dysuria and urgency.       Physical Exam:  Physical Exam   Constitutional: She is oriented to person, place, and time. She appears well-developed and well-nourished. She appears ill. No distress.   HENT:   Head: Normocephalic and atraumatic.   Nose: Nose normal.   Eyes: Pupils are equal, round, and reactive to light. Right eye exhibits no discharge. Left eye exhibits no discharge. No scleral icterus.   Cardiovascular: Normal rate and regular rhythm.  Exam reveals no gallop and no friction rub.    Pulmonary/Chest: She has no wheezes. She has no rales.   Abdominal: Soft. Bowel sounds are normal. She exhibits no distension.   Musculoskeletal: She exhibits no edema.   Neurological: She is alert and oriented to person, place, and time.   Skin: She is not diaphoretic.   Nursing note and vitals reviewed.      Labs:        Invalid input(s): [id number]      Recent Labs      06/03/17 0333 06/04/17 0255 06/05/17   0400   SODIUM  135  132*  129*   POTASSIUM  4.8  4.9  4.7   CHLORIDE  94*  92*  88*   CO2  30  31  30   BUN  51*  50*  74*   CREATININE  3.38*  3.35*  4.88*   CALCIUM  10.2  9.3  9.5     Recent Labs      06/03/17 0333 06/04/17 0255  06/05/17   0400   ALTSGPT   --    --   30   ASTSGOT   --    --   20   ALKPHOSPHAT   --    --   84   TBILIRUBIN   --    --   0.7   PREALBUMIN   --    --   23.0   GLUCOSE  162*  250*  105*     Recent Labs      06/03/17 0333    17   0255  17   1155  17   0400   RBC  3.17*   --   3.23*   --   2.98*   HEMOGLOBIN  8.6*   --   8.8*   --   8.1*   HEMATOCRIT  27.5*   --   28.3*   --   25.6*   PLATELETCT  175   --   183   --   191   APTT   --    < >  80.2*  67.4*  62.0*    < > = values in this interval not displayed.     Recent Labs      17   0333  17   0255  17   0400   WBC  8.6  7.9  7.9   NEUTSPOLYS  71.80  73.80*  65.40   LYMPHOCYTES  13.20*  13.60*  21.80*   MONOCYTES  8.70  7.10  8.50   EOSINOPHILS  1.40  1.60  1.60   BASOPHILS  0.50  0.50  0.30   ASTSGOT   --    --   20   ALTSGPT   --    --   30   ALKPHOSPHAT   --    --   84   TBILIRUBIN   --    --   0.7           Hemodynamics:  Temp (24hrs), Av.3 °C (99.1 °F), Min:37.2 °C (99 °F), Max:37.3 °C (99.1 °F)  Temperature: 37.3 °C (99.1 °F), Monitored Temp: 37.1 °C (98.8 °F)  Pulse  Av.4  Min: 40  Max: 109Heart Rate (Monitored): 70  NIBP: 135/61 mmHg     Respiratory:    Respiration: 17, Pulse Oximetry: 96 %, O2 Daily Delivery Respiratory : Silicone Nasal Cannula     PEP/CPT Method: Positive Airway Pressure Device, Work Of Breathing / Effort: Mild  RUL Breath Sounds: Diminished, RML Breath Sounds: Diminished, RLL Breath Sounds: Diminished, DEV Breath Sounds: Diminished, LLL Breath Sounds: Diminished  Fluids:    Intake/Output Summary (Last 24 hours) at 17 1417  Last data filed at 17 0800   Gross per 24 hour   Intake   1050 ml   Output     85 ml   Net    965 ml        GI/Nutrition:  Orders Placed This Encounter   Procedures   • DIET NPO     Standing Status: Standing      Number of Occurrences: 8      Standing Expiration Date:      Order Specific Question:  Restrict to:     Answer:  Sips with Medications [3]      Comments:  if swallow study not pased, then strict NPO. thanks.      Medical Decision Making, by Problem:  Active Hospital Problems    Diagnosis   • Cardiac arrest (CMS-[facility]) [I46.9]   • Atrial fibrillation (CMS-[facility]) [I48.91]   • ESRD  (end stage renal disease) (CMS-[facility]) [N18.6]   • Acute respiratory failure with hypoxia (CMS-HCC) [J96.01]   • HTN (hypertension) [I10]   • IDDM (insulin dependent diabetes mellitus) (CMS-HCC) [E11.9, Z79.4]   • Dyslipidemia [E78.5]   • Cardiomyopathy (CMS-[facility]CC) [I42.9]   • Anemia [D64.9]   • Elevated troponin [R74.8]       Medications reviewed and Labs reviewed                      Assessment and Plan  1.ESRD/HD  2.HTN: BP controlled  3.Electrolytes: well controlled.  4.Anemia: Hgb 8.1  5.S/p cardiac arrest: plan for coronary angiogram later today  6.Volume:well controlled with UF/HD    Plan for HD today after angiogram  Epo with dialysis  Renal dose all meds  Avoid nephrotoxins  Prognosis guarded.  D/W Dr Valle

## 2017-06-05 NOTE — PROGRESS NOTES
Hospital Medicine Interval Note  Date of Service:  6/5/2017    Chief Complaint  66 y.o.-year-old female admitted 5/27/2017 with cardiac arrest.    Interval Problem Update  Slept better last night, denies CP, scheduled for cath today    Consultants/Specialty  Pulmonology  Cardiology  Nephrology      Disposition  ICU.     Review of Systems   Constitutional: Negative for fever and chills.   HENT: Negative.    Eyes: Negative for discharge and redness.   Respiratory: Positive for cough. Negative for hemoptysis and wheezing.    Cardiovascular: Positive for leg swelling. Negative for chest pain and palpitations.   Gastrointestinal: Negative for nausea, vomiting, abdominal pain and melena.   Genitourinary: Negative for hematuria and flank pain.   Musculoskeletal: Positive for back pain. Negative for myalgias and falls.   Skin: Negative for rash.   Neurological: Positive for weakness (generalized). Negative for dizziness, speech change, focal weakness and seizures.   Psychiatric/Behavioral: Negative for hallucinations. The patient is not nervous/anxious.       Physical Exam Laboratory/Imaging   Filed Vitals:    06/05/17 1200 06/05/17 1300 06/05/17 1400 06/05/17 1500   BP:       Pulse: 74 74 69 65   Temp:       Resp: 20 22 19 20   Height:       Weight:       SpO2: 96% 94% 96% 96%     Physical Exam   Constitutional: She is oriented to person, place, and time. She appears well-developed.   HENT:   Head: Normocephalic and atraumatic.   Mouth/Throat: No oropharyngeal exudate.   Eyes: Right eye exhibits no discharge. Left eye exhibits no discharge. No scleral icterus.   Neck: No JVD present. No tracheal deviation present.   Cardiovascular: Normal rate and regular rhythm.  Exam reveals no friction rub.    No murmur heard.  Pulmonary/Chest: No respiratory distress. She has no decreased breath sounds. She has rales. She exhibits no tenderness.   Abdominal: Soft. Bowel sounds are normal. She exhibits no distension. There is no  tenderness. There is no rebound.   Musculoskeletal: She exhibits edema. She exhibits no tenderness.   Neurological: She is alert and oriented to person, place, and time. No cranial nerve deficit. She exhibits normal muscle tone.   Skin: Skin is warm and dry. She is not diaphoretic. No cyanosis or erythema. Nails show no clubbing.   Psychiatric: She has a normal mood and affect. Her behavior is normal.    Lab Results   Component Value Date/Time    WBC 7.9 06/05/2017 04:00 AM    HEMOGLOBIN 8.1* 06/05/2017 04:00 AM    HEMATOCRIT 25.6* 06/05/2017 04:00 AM    PLATELET COUNT 191 06/05/2017 04:00 AM     Lab Results   Component Value Date/Time    SODIUM 129* 06/05/2017 04:00 AM    POTASSIUM 4.7 06/05/2017 04:00 AM    CHLORIDE 88* 06/05/2017 04:00 AM    CO2 30 06/05/2017 04:00 AM    GLUCOSE 105* 06/05/2017 04:00 AM    BUN 74* 06/05/2017 04:00 AM    CREATININE 4.88* 06/05/2017 04:00 AM      Assessment/Plan    Cardiac arrest (CMS-HCC) (present on admission)  Assessment & Plan  With return of spontaneous circulation.   Await angiogram results    ESRD (end stage renal disease) (CMS-HCC) (present on admission)  Assessment & Plan  HD MWF per nephrology discussed with Dr Najjar    Acute respiratory failure with hypoxia (CMS-HCC) (present on admission)  Assessment & Plan  extubated 6-2   RT protocol  Encourage IS use and mobilization     Atrial fibrillation (CMS-HCC) (present on admission)  Assessment & Plan  Paroxysmal, continue amiodarone coreg   heparin drip  Cardiology following with plan   for cath today    Anemia (present on admission)  Assessment & Plan  No signs of active bleed, monitor  continue Procrit       IDDM (insulin dependent diabetes mellitus) (CMS-HCC) (present on admission)  Assessment & Plan  Continue   NPH and  ISS monitor cbg's  and adjust accordingly     Dyslipidemia (present on admission)  Assessment & Plan  lipitor    Cardiomyopathy (CMS-HCC) (present on admission)  Assessment & Plan    EF35%    Continue  medical management and fluid management with HD  Continue  carvedilol  ,  No ACEI per cardiology      Aspiration pneumonia (CMS-HCC) (present on admission)  Assessment & Plan  Cx MRSA  Completes 7 days of zyvox today  Also Completed 5 day course ceft flagyl for aspiration Pna         Delirium  Assessment & Plan  resolved      Plan of care reviewed with patient and family at bedside  at bedside  and discussed with nursing staff and with   Labs reviewed and Medications reviewed  Patricia catheter: Critically Ill - Requiring Accurate Measurement of Urinary Output  Central line in place: Need for access    DVT Prophylaxis: Heparin      Antibiotics: Treating active infection/contamination beyond 24 hours perioperative coverage

## 2017-06-05 NOTE — CARE PLAN
Problem: Nutritional:  Goal: Nutrition support tolerated and meeting greater than 85% of estimated needs  Outcome: MET Date Met:  06/05/17  -pt NPO for cath placement today.  TF otherwise tolerate at goal rate

## 2017-06-05 NOTE — PROGRESS NOTES
1620-pt arrived from cath lab with tr band in place. Pt is mildly sedated with stable vital signs      1630-3 ml of air removed from tr band every 15 minutes. Site assessed for s/s of bleeding. Will remove all 12 ml of air and then continue to assess radial access site every 15 minutes for one hour.

## 2017-06-05 NOTE — PROGRESS NOTES
Cardiology Progress Note               Author: Eric Dempsey Date & Time created: 6/5/2017  10:39 AM     Interval History:  History obtained with help of  phone\  No issues overnight  NPO for cath today    Review of Systems   Constitutional: Negative.  Negative for fever, chills and malaise/fatigue.   HENT: Negative.  Negative for sore throat.    Eyes: Negative.    Respiratory: Negative.  Negative for cough, hemoptysis, sputum production, shortness of breath, wheezing and stridor.    Cardiovascular: Negative.  Negative for chest pain, palpitations, orthopnea, claudication, leg swelling and PND.   Gastrointestinal: Negative.    Genitourinary: Negative.    Musculoskeletal: Negative.    Skin: Negative.    Neurological: Negative.  Negative for dizziness, loss of consciousness and weakness.   Endo/Heme/Allergies: Negative.  Does not bruise/bleed easily.   All other systems reviewed and are negative.      Physical Exam   Constitutional: She is oriented to person, place, and time. She appears well-developed and well-nourished. No distress.   HENT:   Head: Normocephalic.   Mouth/Throat: Oropharynx is clear and moist.   Eyes: EOM are normal. Pupils are equal, round, and reactive to light. Right eye exhibits no discharge. Left eye exhibits no discharge. No scleral icterus.   Neck: Normal range of motion. Neck supple. No JVD present. No tracheal deviation present.   Cardiovascular: Normal rate, regular rhythm, S1 normal, S2 normal, normal heart sounds, intact distal pulses and normal pulses.  Exam reveals no gallop, no S3, no S4 and no friction rub.    No murmur heard.   No systolic murmur is present    No diastolic murmur is present   Pulses:       Carotid pulses are 2+ on the right side, and 2+ on the left side.       Radial pulses are 2+ on the right side, and 2+ on the left side.        Dorsalis pedis pulses are 2+ on the right side, and 2+ on the left side.        Posterior tibial pulses are 2+ on the  right side, and 2+ on the left side.   Pulmonary/Chest: Effort normal and breath sounds normal. No respiratory distress. She has no wheezes. She has no rales.   Abdominal: Soft. Bowel sounds are normal. She exhibits no distension and no mass. There is no tenderness. There is no rebound and no guarding.   Musculoskeletal: She exhibits no edema.   Neurological: She is alert and oriented to person, place, and time. No cranial nerve deficit.   Skin: Skin is warm and dry. She is not diaphoretic. No pallor.   Psychiatric: She has a normal mood and affect. Her behavior is normal. Judgment and thought content normal.   Nursing note and vitals reviewed.      Hemodynamics:  Temp (24hrs), Av.3 °C (99.2 °F), Min:37.2 °C (99 °F), Max:37.4 °C (99.3 °F)  Temperature: 37.3 °C (99.1 °F), Monitored Temp: 37.2 °C (99 °F)  Pulse  Av.2  Min: 40  Max: 109Heart Rate (Monitored): 74  NIBP: 131/63 mmHg     Respiratory:    Respiration: (!) 23, Pulse Oximetry: 95 %, O2 Daily Delivery Respiratory : Silicone Nasal Cannula     PEP/CPT Method: Positive Airway Pressure Device, Work Of Breathing / Effort: Mild  RUL Breath Sounds: Diminished, RML Breath Sounds: Diminished, RLL Breath Sounds: Diminished, DEV Breath Sounds: Diminished, LLL Breath Sounds: Diminished  Fluids:  Date 17 0700 - 17 0659   Shift 1834-0373 2812-9163 1341-0048 24 Hour Total   I  N  T  A  K  E   I.V. 20   20    Enteral 60   60    Shift Total 80   80   O  U  T  P  U  T   Shift Total       Weight (kg) 67 67 67 67          GI/Nutrition:  Orders Placed This Encounter   Procedures   • DIET NPO     Standing Status: Standing      Number of Occurrences: 8      Standing Expiration Date:      Order Specific Question:  Restrict to:     Answer:  Sips with Medications [3]      Comments:  if swallow study not pased, then strict NPO. thanks.      Lab Results:  Recent Labs      17   0333  17   0255  17   0400   WBC  8.6  7.9  7.9   RBC  3.17*  3.23*   2.98*   HEMOGLOBIN  8.6*  8.8*  8.1*   HEMATOCRIT  27.5*  28.3*  25.6*   MCV  86.8  87.6  85.9   MCH  27.1  27.2  27.2   MCHC  31.3*  31.1*  31.6*   RDW  56.6*  56.2*  54.6*   PLATELETCT  175  183  191   MPV  10.0  10.0  10.5     Recent Labs      06/03/17   0333  06/04/17   0255  06/05/17   0400   SODIUM  135  132*  129*   POTASSIUM  4.8  4.9  4.7   CHLORIDE  94*  92*  88*   CO2  30  31  30   GLUCOSE  162*  250*  105*   BUN  51*  50*  74*   CREATININE  3.38*  3.35*  4.88*   CALCIUM  10.2  9.3  9.5     Recent Labs      06/04/17   0255  06/04/17   1155  06/05/17   0400   APTT  80.2*  67.4*  62.0*                     Medical Decision Making, by Problem:  Active Hospital Problems    Diagnosis   • Cardiac arrest (CMS-HCC) [I46.9]   • Atrial fibrillation (CMS-HCC) [I48.91]   • ESRD (end stage renal disease) (CMS-HCC) [N18.6]   • Acute respiratory failure with hypoxia (CMS-HCC) [J96.01]   • Delirium [R41.0]   • Aspiration pneumonia (CMS-HCC) [J69.0]   • HTN (hypertension) [I10]   • IDDM (insulin dependent diabetes mellitus) (CMS-HCC) [E11.9, Z79.4]   • Dyslipidemia [E78.5]   • Cardiomyopathy (CMS-HCC) [I42.9]   • Anemia [D64.9]   • Elevated troponin [R74.8]       Plan:  65 y/o F with s/p arrest, a-fib with cardiomyopathy.  We will get the cardiac catheterization today for further risk stratification.     1. CHFrEF    - cont coreg 6.25 BID    - hold ACE    - hold meagan given ESRD    - asa 81    - atorva 40    2. A-fib    - cont amio    3. S/P arrest    - likely VT, amio per above      4. NSTEMI    - atorva per above    - asa per above    EKG reviewed, Medications reviewed, Radiology images reviewed and Labs reviewed  Patricia catheter: No Patricia        DVT prophylaxis - mechanical: SCDs  Ulcer prophylaxis: No

## 2017-06-05 NOTE — DISCHARGE PLANNING
Now extubated. Family bedside.  SLP following.  René.  No overnight events.  Neuro status improved. HD this afternoon.     Medicare insurance for post acute needs.     This chart has been reviewed by the hospital case management team. Based on the chart review and the patient's current medical status, the anticipated discharge plan is home vs SNF.    PT/OT would be beneficial to assist with DC planning.

## 2017-06-05 NOTE — PROGRESS NOTES
Pulmonary Critical Care Progress Note      Date of service: 6/5/2017    Chief Complaint: Cardiac Arrest    History of Present Illness: Patient is a 67 y/o F, history of end stage renal disease, Right AV fistula, chronic HD, DM, admitted through the ED on 5/27/2017 after out of hospital cardiac arrest asystole with ROSC after two rounds of epi. Intubated remains on full ventilator support but now awake and following commands in Kiswahili.     ROS:    Respiratory: unable to perform due to the patient's inability to effectively communicate,   Cardiac: unable to perform due to the patient's inability to effectively communicate,   GI: unable to perform due to the patient's inability to effectively communicate.        Interval Events:  24 hour interval history reviewed   Scheduled for angio today   No overnight events   Oriented intermittently,   Neuro status improved with sleep.   A fib rate controlled,  systolic, Heparin gtts.   Minimal UOP. Dialysis today.   Mobile to commode  2L NC, 250 mL IS  Relatively clear on CXR       PFSH:  No change.      Physical Exam  General: Sitting up in chair and conversing with daughter.   Neuro/Psych: Awake and alert, interactive with family. Mild facial resting tremor, unchanged. Moving all extremities symmetrically.   HEENT: PERRL. Right nare cortrak with TF currently on hold, Supple, midline, no crepitus. Right IJ CVC CDI. Mucous membranes are pink and moist.   CVS: Distant, irregular.   Respiratory:  Diminished throughout but clear.   Abdomen/:  Soft. NT. Tolerating tube feed. Bowel sounds hypoactive but present. Patricia in place.   Extremities: Distal pulses 2+ bilaterally. Trace lower extremity edema.   Skin: Cool, dry, perfused. Capillary refill intact.       Respiratory:     Pulse Oximetry: 100 %  ImagingAvailable data reviewed         Invalid input(s): [id number]  HemoDynamics:  Pulse: 62, Heart Rate (Monitored): 62  NIBP: (!) 112/38 mmHg    Imaging: Available data  reviewed     ECHO 5/27:  CONCLUSIONS  No prior study is available for comparison.   Normal left ventricular chamber size. Normal left ventricular wall thickness. Moderately reduced left ventricular systolic function. Left ventricular ejection fraction is visually estimated to be 35%. Normal   regional wall motion.   Grade II diastolic dysfunction.  Moderately dilated left atrium.  Moderate mitral regurgitation.  Estimated right ventricular systolic pressure is at least 45 mmHg.  IVC not visualized.      Neuro:  GCS Total Reed Coma Score: 15  Imaging: Available data reviewed    Fluids:  Intake/Output       06/03/17 0700 - 06/04/17 0659 06/04/17 0700 - 06/05/17 0659 06/05/17 0700 - 06/06/17 0659      0700-1859 8044-8246 Total 0700-1859 1900-0659 Total 9559-3667 2867-8737 Total       Intake    I.V.  120  100 220  120  120 240  --  -- --    IV Volume 120 100 220 120 120 240 -- -- --    Other  120  -- 120  120  -- 120  --  -- --    Medications (P.O./ Enteral Liquids) 120 -- 120 120 -- 120 -- -- --    Enteral  880  1000 1880  1040  530 1570  --  -- --    Enteral Volume  660 330 990 -- -- --    Free Water / Tube Flush 240 450 690 380 200 580 -- -- --    Total Intake 1120 1100 2220 3207 412 3054 -- -- --       Output    Urine  20  80 100  110  45 155  --  -- --    Indwelling Cathether 20 80 100 110 45 155 -- -- --    Dialysis  2000 -- 2000  --  -- --  --  -- --    Dialysis Output (Dialysis Output) 2000 -- 2000 -- -- -- -- -- --    Stool  --  -- --  --  -- --  --  -- --    Number of Times Stooled -- -- -- -- 1 x 1 x -- -- --    Total Output 2020 80 2100 110 45 155 -- -- --       Net I/O     -900 2517 892 6122 605 1775 -- -- --           Recent Labs      06/03/17   0333  06/04/17   0255  06/05/17   0400   SODIUM  135  132*  129*   POTASSIUM  4.8  4.9  4.7   CHLORIDE  94*  92*  88*   CO2  30  31  30   BUN  51*  50*  74*   CREATININE  3.38*  3.35*  4.88*   CALCIUM  10.2  9.3  9.5     GI/Nutrition:  Imaging:  Available data reviewed  NPO and tube feed Tolerated at 40.     Liver Function:  Recent Labs      17   0400   ALTSGPT   --    --   30   ASTSGOT   --    --   20   ALKPHOSPHAT   --    --   84   TBILIRUBIN   --    --   0.7   PREALBUMIN   --    --   23.0   GLUCOSE  162*  250*  105*     Heme:  Recent Labs      17   1155  17   0400   RBC  3.17*   --   3.23*   --   2.98*   HEMOGLOBIN  8.6*   --   8.8*   --   8.1*   HEMATOCRIT  27.5*   --   28.3*   --   25.6*   PLATELETCT  175   --   183   --   191   APTT   --    < >  80.2*  67.4*  62.0*    < > = values in this interval not displayed.     Infectious Disease:  Temp  Av.3 °C (99.1 °F)  Min: 37.2 °C (99 °F)  Max: 37.4 °C (99.3 °F)  Monitored Temp  Av.2 °C (99 °F)  Min: 36.9 °C (98.4 °F)  Max: 37.4 °C (99.3 °F)  Micro: reviewed   Day  Zyvox.   Recent Labs      17   0400   WBC  8.6  7.9  7.9   NEUTSPOLYS  71.80  73.80*  65.40   LYMPHOCYTES  13.20*  13.60*  21.80*   MONOCYTES  8.70  7.10  8.50   EOSINOPHILS  1.40  1.60  1.60   BASOPHILS  0.50  0.50  0.30   ASTSGOT   --    --   20   ALTSGPT   --    --   30   ALKPHOSPHAT   --    --   84   TBILIRUBIN   --    --   0.7     Current Facility-Administered Medications   Medication Dose Frequency Provider Last Rate Last Dose   • insulin NPH (HUMULIN,NOVOLIN) injection 8 Units  8 Units BID INSULIN Macario Jo M.D.   Stopped at 17 0700   • risperidone (RISPERDAL) tablet 0.5 mg  0.5 mg HS PRN Macario Jo M.D.   0.5 mg at 17   • lidocaine-prilocaine (EMLA) 2.5-2.5 % cream   DIALYSIS PRN Ricardo Petersen M.D.       • omeprazole 2 mg/mL in sodium bicarbonate (PRILOSEC) oral susp 40 mg  40 mg DAILY Carlos Manuel Man M.D.   40 mg at 17 0751   • oxycodone immediate-release (ROXICODONE) tablet 5 mg  5 mg Q6HRS PRN Carlos Manuel Man M.D.   5 mg at 17 2122   •  prochlorperazine (COMPAZINE) injection 10 mg  10 mg Q6HRS PRN Jeremy M Gonda, M.D.   10 mg at 06/04/17 2349   • albumin human 25% solution 12.5 g  12.5 g Q HOUR PRN Ricardo Petersen M.D.   Stopped at 06/02/17 0715   • carvedilol (COREG) tablet 6.25 mg  6.25 mg BID WITH MEALS Carlos Manuel Man M.D.   6.25 mg at 06/04/17 1730   • insulin lispro (HUMALOG) injection 3-14 Units  3-14 Units Q6HRS Carlos Manuel Man M.D.   Stopped at 06/05/17 0600   • linezolid (ZYVOX) tablet 600 mg  600 mg Q12HRS Carlos Manuel Man M.D.   600 mg at 06/04/17 2013   • NS (BOLUS) infusion 250 mL  250 mL DIALYSIS PRN Ricardo Petersen M.D.       • epoetin micheal (EPOGEN,PROCRIT) injection 10,000 Units  10,000 Units MO, WE +  Ricardo Petersen M.D.   10,000 Units at 06/02/17 0700   • NS (BOLUS) infusion 250 mL  250 mL DIALYSIS PRN Ricardo Petersen M.D.       • heparin 1000 units/mL injection 2,600 Units  2,600 Units PRN Ricardo Castaneda M.D.   2,600 Units at 06/03/17 2153    And   • heparin infusion 25,000 units in 500 ml 0.45% nacl   Continuous Ricardo Castaneda M.D.   Stopped at 06/05/17 0359   • aspirin EC (ECOTRIN) tablet 81 mg  81 mg DAILY Mulu Bryant M.D.   81 mg at 06/04/17 0751   • atorvastatin (LIPITOR) tablet 40 mg  40 mg QHS Mulu Bryant M.D.   40 mg at 06/04/17 2012   • amiodarone (CORDARONE) tablet 400 mg  400 mg TWICE DAILY Mulu Bryant M.D.   400 mg at 06/04/17 2013   • amiodarone (CORDARONE) tablet 200 mg  200 mg TWICE DAILY Mulu Bryant M.D.       • [START ON 6/13/2017] amiodarone (CORDARONE) tablet 200 mg  200 mg Q DAY Mulu Bryant M.D.       • acetaminophen (TYLENOL) suppository 650 mg  650 mg Q6HRS PRN Dragan Martinez M.D.   650 mg at 05/28/17 0444   • Respiratory Care per Protocol   Continuous RT Dragan Martinez M.D.       • senna-docusate (PERICOLACE or SENOKOT S) 8.6-50 MG per tablet 2 Tab  2 Tab BID Dragan Martinez M.D.   Stopped at 06/04/17 2100    And   • polyethylene glycol/lytes (MIRALAX) PACKET 1 Packet   1 Packet QDAY PRN Dragan Martinez M.D.   1 Packet at 05/30/17 2121    And   • magnesium hydroxide (MILK OF MAGNESIA) suspension 30 mL  30 mL QDAY PRN Dragan Martinez M.D.        And   • bisacodyl (DULCOLAX) suppository 10 mg  10 mg QDAY PRN Dragan Martinez M.D.       • lidocaine (XYLOCAINE) 1%  injection  1-2 mL Q30 MIN PRN Dragan Martinez M.D.   2 mL at 06/02/17 0028   • NS infusion   Continuous Dragan Martinez M.D. 10 mL/hr at 06/04/17 2038     • ipratropium-albuterol (DUONEB) nebulizer solution 3 mL  3 mL Q2HRS PRN (RT) Dragan Martinez M.D.       • enalaprilat (VASOTEC) injection 1.25 mg  1.25 mg Q6HRS PRN Dragan Sampson D.O.       • labetalol (NORMODYNE,TRANDATE) injection 10 mg  10 mg Q4HRS PRN Dragan Sampson D.O.       • ondansetron (ZOFRAN) syringe/vial injection 4 mg  4 mg Q4HRS PRN Dragan Sampson D.O.   4 mg at 06/03/17 0833   • ondansetron (ZOFRAN ODT) dispertab 4 mg  4 mg Q4HRS PRN Dragan Sampson D.O.       • acetaminophen (TYLENOL) tablet 650 mg  650 mg Q6HRS PRN Dragan Sampson D.O.   650 mg at 06/03/17 1455   • glucose 4 g chewable tablet 16 g  16 g Q15 MIN PRN Drgaan Sampson D.O.        And   • dextrose 50% (D50W) injection 25 mL  25 mL Q15 MIN PRN Dragan Sampson D.O.         This patient's medications have not been reviewed.    Quality  Measures:  Labs reviewed, Medications reviewed and Radiology images reviewed                    Assessment and Plan:  Ventilator-dependent respiratory failure.   - Intubated AM 5/27   - doing well after extubation    - IS, RT protocols  Abnormal CXR - RLL ATX/pna   - zyvox   Status post witnessed out-of-hospital asystolic cardiac arrest.    - Cardiology following- primary  Cardiac arrest/NSTEMI?/valvular heart Dz/myop   - Cath tomorrow    - correct lytes  Cardiomyopathy - EF 35%   - Grade II DD  PHTN - mild-moderate RVSP 45   - Secondary to MR? Moderate by ECHO  Encephalopathy   - improved; follows   - speaks Italian; family  assists  Anemia with thrombocytopenia.   - Serial lab - transfuse PRN with restrictive threshold  End-stage renal disease, on maintenance hemodialysis.   - Renal following   - HD daily  Diabetes mellitus - glycemic control  Enteral nutrrition - Cortrak  ERP  Mobilize  Prophylaxis   Daily SBTs after dialysis   heparin gtt.     Discontinue Zyvox   Discontinue daily CXR      Discussed patient condition and risk of morbidity and/or mortality with Family, RN, RT, Pharmacy, Charge nurse / hot rounds and cardiology and nephrology.    The patient remains critically ill.  Critical care time = 31 minutes in directly providing and coordinating critical care and extensive data review.  No time overlap and excludes procedures.    Diamond VALDEZ (Tad), am scribing for, and in the presence of, Carlos Manuel Man M.D.  Electronically signed by: Diamond Scott (Tad), 6/5/2017  Carlos Manuel VALDEZ M.D. personally performed the services described in this documentation, as scribed by Diamond Scott in my presence, and it is both accurate and complete.

## 2017-06-05 NOTE — PROGRESS NOTES
Dr. Bryant at bedside. Spoke to patient and patient's  about heart cath scheduled for today using  services. Pt aware she has procedure today, all questions answered. Pt  stated son is on the way in.  stated to page him once family arrives and will come back to speak with son as well.

## 2017-06-05 NOTE — CARE PLAN
Problem: Pain Management  Goal: Pain level will decrease to patient’s comfort goal  Outcome: PROGRESSING AS EXPECTED  Patient's pain will be assessed every 2 hours and treated accordingly with pharmacological and non-pharmacological interventions. Pain will be reassessed in a timely manner and appropriate follow-up action taken. Pt will use appropriate pain scale for situation, set a comfort score for themselves at the beginning of the shift, and rate pain with each assessment.           Problem: Skin Integrity  Goal: Skin Integrity is maintained or improved  Outcome: PROGRESSING AS EXPECTED  Problem: risk for impaired skin integrity   Goal: maintain skin integrity throughout duration of pt stay.   Outcome: progressing as expected  -liz scale completed q shift, see CCU obs  -Pt turned q2 hours and documented, pillows used for repositioning  -pt assessed for skin breakdown, any abnormalities recorded in wound flowsheet

## 2017-06-06 ENCOUNTER — APPOINTMENT (OUTPATIENT)
Dept: RADIOLOGY | Facility: MEDICAL CENTER | Age: 67
DRG: 264 | End: 2017-06-06
Attending: HOSPITALIST
Payer: MEDICARE

## 2017-06-06 ENCOUNTER — APPOINTMENT (OUTPATIENT)
Dept: RADIOLOGY | Facility: MEDICAL CENTER | Age: 67
DRG: 264 | End: 2017-06-06
Attending: INTERNAL MEDICINE
Payer: MEDICARE

## 2017-06-06 LAB
ANION GAP SERPL CALC-SCNC: 8 MMOL/L (ref 0–11.9)
APTT PPP: 67.7 SEC (ref 24.7–36)
APTT PPP: 69.5 SEC (ref 24.7–36)
BUN SERPL-MCNC: 46 MG/DL (ref 8–22)
CALCIUM SERPL-MCNC: 9 MG/DL (ref 8.5–10.5)
CHLORIDE SERPL-SCNC: 93 MMOL/L (ref 96–112)
CO2 SERPL-SCNC: 31 MMOL/L (ref 20–33)
CREAT SERPL-MCNC: 3.28 MG/DL (ref 0.5–1.4)
ERYTHROCYTE [DISTWIDTH] IN BLOOD BY AUTOMATED COUNT: 54.3 FL (ref 35.9–50)
GFR SERPL CREATININE-BSD FRML MDRD: 14 ML/MIN/1.73 M 2
GLUCOSE BLD-MCNC: 165 MG/DL (ref 65–99)
GLUCOSE BLD-MCNC: 189 MG/DL (ref 65–99)
GLUCOSE BLD-MCNC: 246 MG/DL (ref 65–99)
GLUCOSE SERPL-MCNC: 159 MG/DL (ref 65–99)
HCT VFR BLD AUTO: 26.9 % (ref 37–47)
HGB BLD-MCNC: 8.5 G/DL (ref 12–16)
INR PPP: 1.08 (ref 0.87–1.13)
MCH RBC QN AUTO: 27.1 PG (ref 27–33)
MCHC RBC AUTO-ENTMCNC: 31.6 G/DL (ref 33.6–35)
MCV RBC AUTO: 85.7 FL (ref 81.4–97.8)
PLATELET # BLD AUTO: 196 K/UL (ref 164–446)
PMV BLD AUTO: 9.9 FL (ref 9–12.9)
POTASSIUM SERPL-SCNC: 4.4 MMOL/L (ref 3.6–5.5)
PROTHROMBIN TIME: 14.3 SEC (ref 12–14.6)
RBC # BLD AUTO: 3.14 M/UL (ref 4.2–5.4)
SODIUM SERPL-SCNC: 132 MMOL/L (ref 135–145)
WBC # BLD AUTO: 8.1 K/UL (ref 4.8–10.8)

## 2017-06-06 PROCEDURE — 700102 HCHG RX REV CODE 250 W/ 637 OVERRIDE(OP): Performed by: INTERNAL MEDICINE

## 2017-06-06 PROCEDURE — 99233 SBSQ HOSP IP/OBS HIGH 50: CPT | Performed by: HOSPITALIST

## 2017-06-06 PROCEDURE — G8988 SELF CARE GOAL STATUS: HCPCS | Mod: CI

## 2017-06-06 PROCEDURE — 80048 BASIC METABOLIC PNL TOTAL CA: CPT

## 2017-06-06 PROCEDURE — 770021 HCHG ROOM/CARE - ISO PRIVATE

## 2017-06-06 PROCEDURE — 99291 CRITICAL CARE FIRST HOUR: CPT | Performed by: INTERNAL MEDICINE

## 2017-06-06 PROCEDURE — 85027 COMPLETE CBC AUTOMATED: CPT

## 2017-06-06 PROCEDURE — 700111 HCHG RX REV CODE 636 W/ 250 OVERRIDE (IP): Performed by: HOSPITALIST

## 2017-06-06 PROCEDURE — G8987 SELF CARE CURRENT STATUS: HCPCS | Mod: CL

## 2017-06-06 PROCEDURE — A9270 NON-COVERED ITEM OR SERVICE: HCPCS | Performed by: INTERNAL MEDICINE

## 2017-06-06 PROCEDURE — 71010 DX-CHEST-PORTABLE (1 VIEW): CPT

## 2017-06-06 PROCEDURE — 700102 HCHG RX REV CODE 250 W/ 637 OVERRIDE(OP): Performed by: HOSPITALIST

## 2017-06-06 PROCEDURE — 82962 GLUCOSE BLOOD TEST: CPT

## 2017-06-06 PROCEDURE — 85730 THROMBOPLASTIN TIME PARTIAL: CPT

## 2017-06-06 PROCEDURE — A9270 NON-COVERED ITEM OR SERVICE: HCPCS | Performed by: HOSPITALIST

## 2017-06-06 PROCEDURE — 97162 PT EVAL MOD COMPLEX 30 MIN: CPT

## 2017-06-06 PROCEDURE — 97167 OT EVAL HIGH COMPLEX 60 MIN: CPT

## 2017-06-06 PROCEDURE — 92526 ORAL FUNCTION THERAPY: CPT

## 2017-06-06 PROCEDURE — 85610 PROTHROMBIN TIME: CPT

## 2017-06-06 PROCEDURE — G8978 MOBILITY CURRENT STATUS: HCPCS | Mod: CL

## 2017-06-06 PROCEDURE — G8979 MOBILITY GOAL STATUS: HCPCS | Mod: CI

## 2017-06-06 RX ORDER — WARFARIN SODIUM 4 MG/1
4 TABLET ORAL
Status: DISCONTINUED | OUTPATIENT
Start: 2017-06-07 | End: 2017-06-07 | Stop reason: HOSPADM

## 2017-06-06 RX ORDER — RISPERIDONE 1 MG/1
1 TABLET ORAL NIGHTLY PRN
Status: DISCONTINUED | OUTPATIENT
Start: 2017-06-06 | End: 2017-06-07 | Stop reason: HOSPADM

## 2017-06-06 RX ORDER — LISINOPRIL 5 MG/1
2.5 TABLET ORAL
Status: DISCONTINUED | OUTPATIENT
Start: 2017-06-06 | End: 2017-06-07

## 2017-06-06 RX ORDER — WARFARIN SODIUM 7.5 MG/1
7.5 TABLET ORAL
Status: COMPLETED | OUTPATIENT
Start: 2017-06-06 | End: 2017-06-06

## 2017-06-06 RX ADMIN — LISINOPRIL 2.5 MG: 5 TABLET ORAL at 14:21

## 2017-06-06 RX ADMIN — INSULIN LISPRO 4 UNITS: 100 INJECTION, SOLUTION INTRAVENOUS; SUBCUTANEOUS at 06:18

## 2017-06-06 RX ADMIN — STANDARDIZED SENNA CONCENTRATE AND DOCUSATE SODIUM 2 TABLET: 8.6; 5 TABLET, FILM COATED ORAL at 21:30

## 2017-06-06 RX ADMIN — ONDANSETRON 4 MG: 4 TABLET, ORALLY DISINTEGRATING ORAL at 11:11

## 2017-06-06 RX ADMIN — INSULIN HUMAN 8 UNITS: 100 INJECTION, SUSPENSION SUBCUTANEOUS at 17:26

## 2017-06-06 RX ADMIN — OMEPRAZOLE 40 MG: 20 CAPSULE, DELAYED RELEASE ORAL at 08:19

## 2017-06-06 RX ADMIN — AMIODARONE HYDROCHLORIDE 200 MG: 200 TABLET ORAL at 21:00

## 2017-06-06 RX ADMIN — WARFARIN SODIUM 7.5 MG: 7.5 TABLET ORAL at 17:25

## 2017-06-06 RX ADMIN — CARVEDILOL 6.25 MG: 6.25 TABLET, FILM COATED ORAL at 17:25

## 2017-06-06 RX ADMIN — AMIODARONE HYDROCHLORIDE 200 MG: 200 TABLET ORAL at 08:19

## 2017-06-06 RX ADMIN — INSULIN LISPRO 3 UNITS: 100 INJECTION, SOLUTION INTRAVENOUS; SUBCUTANEOUS at 12:33

## 2017-06-06 RX ADMIN — RISPERIDONE 1 MG: 1 TABLET, FILM COATED ORAL at 21:29

## 2017-06-06 RX ADMIN — CARVEDILOL 6.25 MG: 6.25 TABLET, FILM COATED ORAL at 08:19

## 2017-06-06 RX ADMIN — INSULIN LISPRO 3 UNITS: 100 INJECTION, SOLUTION INTRAVENOUS; SUBCUTANEOUS at 01:02

## 2017-06-06 RX ADMIN — OXYCODONE HYDROCHLORIDE 5 MG: 5 TABLET ORAL at 21:30

## 2017-06-06 RX ADMIN — INSULIN LISPRO 3 UNITS: 100 INJECTION, SOLUTION INTRAVENOUS; SUBCUTANEOUS at 17:26

## 2017-06-06 RX ADMIN — ACETAMINOPHEN 650 MG: 325 TABLET, FILM COATED ORAL at 08:19

## 2017-06-06 RX ADMIN — ATORVASTATIN CALCIUM 40 MG: 40 TABLET, FILM COATED ORAL at 21:30

## 2017-06-06 RX ADMIN — INSULIN HUMAN 8 UNITS: 100 INJECTION, SUSPENSION SUBCUTANEOUS at 08:19

## 2017-06-06 RX ADMIN — OXYCODONE HYDROCHLORIDE 5 MG: 5 TABLET ORAL at 10:13

## 2017-06-06 RX ADMIN — HEPARIN SODIUM 850 UNITS/HR: 5000 INJECTION, SOLUTION INTRAVENOUS at 12:37

## 2017-06-06 RX ADMIN — ASPIRIN 81 MG: 81 TABLET ORAL at 08:18

## 2017-06-06 ASSESSMENT — PAIN SCALES - GENERAL
PAINLEVEL_OUTOF10: 6
PAINLEVEL_OUTOF10: 0
PAINLEVEL_OUTOF10: 2
PAINLEVEL_OUTOF10: 0
PAINLEVEL_OUTOF10: 5
PAINLEVEL_OUTOF10: 0

## 2017-06-06 ASSESSMENT — COGNITIVE AND FUNCTIONAL STATUS - GENERAL
STANDING UP FROM CHAIR USING ARMS: A LOT
HELP NEEDED FOR BATHING: A LOT
PERSONAL GROOMING: A LITTLE
DRESSING REGULAR UPPER BODY CLOTHING: A LITTLE
TOILETING: A LOT
TURNING FROM BACK TO SIDE WHILE IN FLAT BAD: A LOT
SUGGESTED CMS G CODE MODIFIER MOBILITY: CL
EATING MEALS: TOTAL
DAILY ACTIVITIY SCORE: 13
MOVING TO AND FROM BED TO CHAIR: A LOT
CLIMB 3 TO 5 STEPS WITH RAILING: TOTAL
WALKING IN HOSPITAL ROOM: TOTAL
MOBILITY SCORE: 10
DRESSING REGULAR LOWER BODY CLOTHING: A LOT
MOVING FROM LYING ON BACK TO SITTING ON SIDE OF FLAT BED: A LOT
SUGGESTED CMS G CODE MODIFIER DAILY ACTIVITY: CL

## 2017-06-06 ASSESSMENT — ENCOUNTER SYMPTOMS
VOMITING: 0
COUGH: 0
SHORTNESS OF BREATH: 0
NAUSEA: 0
FEVER: 0
WEIGHT LOSS: 0

## 2017-06-06 ASSESSMENT — ACTIVITIES OF DAILY LIVING (ADL): TOILETING: INDEPENDENT

## 2017-06-06 ASSESSMENT — CHA2DS2 SCORE
CHF OR LEFT VENTRICULAR DYSFUNCTION: YES
AGE 75 OR GREATER: NO
AGE 65 TO 74: YES
VASCULAR DISEASE: YES
DIABETES: YES
HYPERTENSION: YES
PRIOR STROKE OR TIA OR THROMBOEMBOLISM: NO
SEX: FEMALE
CHA2DS2 VASC SCORE: 6

## 2017-06-06 ASSESSMENT — GAIT ASSESSMENTS: GAIT LEVEL OF ASSIST: UNABLE TO PARTICIPATE

## 2017-06-06 NOTE — PROGRESS NOTES
Cardiology Progress Note               Author: Darriusjaidendylan CHER Roselyn Date & Time created: 2017  1:45 PM     Interval History:  No issues overnight  Still recovering from weakness      Review of Systems   Unable to perform ROS      Physical Exam   Constitutional: She is oriented to person, place, and time. She appears well-developed and well-nourished. No distress.   HENT:   Head: Normocephalic.   Mouth/Throat: Oropharynx is clear and moist.   Eyes: EOM are normal. Pupils are equal, round, and reactive to light. Right eye exhibits no discharge. Left eye exhibits no discharge. No scleral icterus.   Neck: Normal range of motion. Neck supple. No JVD present. No tracheal deviation present.   Cardiovascular: Normal rate, regular rhythm, S1 normal, S2 normal, normal heart sounds, intact distal pulses and normal pulses.  Exam reveals no gallop, no S3, no S4 and no friction rub.    No murmur heard.   No systolic murmur is present    No diastolic murmur is present   Pulses:       Carotid pulses are 2+ on the right side, and 2+ on the left side.       Radial pulses are 2+ on the right side, and 2+ on the left side.        Dorsalis pedis pulses are 2+ on the right side, and 2+ on the left side.        Posterior tibial pulses are 2+ on the right side, and 2+ on the left side.   Pulmonary/Chest: Effort normal and breath sounds normal. No respiratory distress. She has no wheezes. She has no rales.   Abdominal: Soft. Bowel sounds are normal. She exhibits no distension and no mass. There is no tenderness. There is no rebound and no guarding.   Musculoskeletal: She exhibits no edema.   Neurological: She is alert and oriented to person, place, and time. No cranial nerve deficit.   Skin: Skin is warm and dry. She is not diaphoretic. No pallor.   Psychiatric: She has a normal mood and affect. Her behavior is normal. Judgment and thought content normal.       Hemodynamics:  Temp (24hrs), Av.8 °C (100 °F), Min:37.8 °C (100 °F),  Max:37.8 °C (100 °F)  Temperature: 37.8 °C (100 °F), Monitored Temp: 37.6 °C (99.7 °F)  Pulse  Av.3  Min: 40  Max: 216Heart Rate (Monitored): 72  NIBP: 158/54 mmHg     Respiratory:    Respiration: (!) 24, Pulse Oximetry: 98 %     Work Of Breathing / Effort: Mild  RUL Breath Sounds: Diminished, RML Breath Sounds: Diminished, RLL Breath Sounds: Diminished, DEV Breath Sounds: Diminished, LLL Breath Sounds: Diminished  Fluids:  Date 17 07 - 17 0659   Shift 4797-3135 8886-7164 3302-3133 24 Hour Total   I  N  T  A  K  E   I.V. 162   162    Enteral 220   220    Shift Total 382   382   O  U  T  P  U  T   Urine 90   90    Shift Total 90   90   Weight (kg) 69.6 69.6 69.6 69.6       Weight: 69.6 kg (153 lb 7 oz)  GI/Nutrition:  No orders of the defined types were placed in this encounter.     Lab Results:  Recent Labs      17   02517   04017   0059   WBC  7.9  7.9  8.1   RBC  3.23*  2.98*  3.14*   HEMOGLOBIN  8.8*  8.1*  8.5*   HEMATOCRIT  28.3*  25.6*  26.9*   MCV  87.6  85.9  85.7   MCH  27.2  27.2  27.1   MCHC  31.1*  31.6*  31.6*   RDW  56.2*  54.6*  54.3*   PLATELETCT  183  191  196   MPV  10.0  10.5  9.9     Recent Labs      17   0255  17   0400  17   0059   SODIUM  132*  129*  132*   POTASSIUM  4.9  4.7  4.4   CHLORIDE  92*  88*  93*   CO2  31  30  31   GLUCOSE  250*  105*  159*   BUN  50*  74*  46*   CREATININE  3.35*  4.88*  3.28*   CALCIUM  9.3  9.5  9.0     Recent Labs      17   1155  17   0400  17   0059  17   1300   APTT  67.4*  62.0*  67.7*   --    INR   --    --    --   1.08                     Medical Decision Making, by Problem:  Active Hospital Problems    Diagnosis   • Cardiac arrest (CMS-[facility]) [I46.9]   • Atrial fibrillation (CMS-[facility]) [I48.91]   • ESRD (end stage renal disease) (CMS-[facility]) [N18.6]   • Acute respiratory failure with hypoxia (CMS-HCC) [J96.01]   • Delirium [R41.0]   • Aspiration pneumonia (CMS-HCC) [J69.0]   •  HTN (hypertension) [I10]   • IDDM (insulin dependent diabetes mellitus) (CMS-HCC) [E11.9, Z79.4]   • Dyslipidemia [E78.5]   • Cardiomyopathy (CMS-HCC) [I42.9]   • Anemia [D64.9]   • Elevated troponin [R74.8]       Plan:  67 y/o F with s/p arrest, a-fib with cardiomyopathy.  Cath showed 3V CAD, no revasc and medical management.  Based on review of the records, the arrest sounds more PEA than VT, therefore does not need a lifevest or ICD.    1. CHFrEF    - cont coreg 6.25 BID    - start lisinopril 2.5 today    - hold meagan given ESRD    - asa 81    - atorva 40    2. A-fib    - cont amio    3. S/P arrest    - PEA    4. NSTEMI    - atorva per above    - asa per above    EKG reviewed, Medications reviewed, Radiology images reviewed and Labs reviewed  Patricia catheter: Critically Ill - Requiring Accurate Measurement of Urinary Output        DVT prophylaxis - mechanical: SCDs  Ulcer prophylaxis: No

## 2017-06-06 NOTE — PROGRESS NOTES
Nephrology Progress Note, Adult, Complex               Author: Mickey Najjar   Date & Time created: 6/6/2017  1:50 PM   Interval History:  65 y/o female with ESRD/HD, s/p cardiac arrest  Results of angiogram noted  Events last 24h noted    Review of Systems:  Review of Systems   Constitutional: Negative for fever and weight loss.   Respiratory: Negative for cough and shortness of breath.    Cardiovascular: Negative for chest pain and leg swelling.   Gastrointestinal: Negative for nausea and vomiting.   Genitourinary: Negative for dysuria and urgency.       Physical Exam:  Physical Exam   Constitutional: She is oriented to person, place, and time. She appears well-developed and well-nourished. She appears ill. No distress.   HENT:   Head: Normocephalic and atraumatic.   Nose: Nose normal.   Eyes: Pupils are equal, round, and reactive to light. Right eye exhibits no discharge. Left eye exhibits no discharge. No scleral icterus.   Cardiovascular: Normal rate and regular rhythm.  Exam reveals no gallop and no friction rub.    Pulmonary/Chest: She has no wheezes. She has no rales.   Abdominal: Soft. Bowel sounds are normal. She exhibits no distension.   Musculoskeletal: She exhibits no edema.   Neurological: She is alert and oriented to person, place, and time.   Skin: She is not diaphoretic.   Nursing note and vitals reviewed.      Labs:        Invalid input(s): [id number]      Recent Labs      06/04/17 0255 06/05/17   0400  06/06/17   0059   SODIUM  132*  129*  132*   POTASSIUM  4.9  4.7  4.4   CHLORIDE  92*  88*  93*   CO2  31  30  31   BUN  50*  74*  46*   CREATININE  3.35*  4.88*  3.28*   CALCIUM  9.3  9.5  9.0     Recent Labs      06/04/17 0255 06/05/17   0400  06/06/17   0059   ALTSGPT   --   30   --    ASTSGOT   --   20   --    ALKPHOSPHAT   --   84   --    TBILIRUBIN   --   0.7   --    PREALBUMIN   --   23.0   --    GLUCOSE  250*  105*  159*     Recent Labs      06/04/17 0255 06/04/17   1155   17   0400  17   0059  17   1300   RBC  3.23*   --   2.98*  3.14*   --    HEMOGLOBIN  8.8*   --   8.1*  8.5*   --    HEMATOCRIT  28.3*   --   25.6*  26.9*   --    PLATELETCT  183   --   191  196   --    PROTHROMBTM   --    --    --    --   14.3   APTT  80.2*  67.4*  62.0*  67.7*   --    INR   --    --    --    --   1.08     Recent Labs      17   0255  17   0400  17   0059   WBC  7.9  7.9  8.1   NEUTSPOLYS  73.80*  65.40   --    LYMPHOCYTES  13.60*  21.80*   --    MONOCYTES  7.10  8.50   --    EOSINOPHILS  1.60  1.60   --    BASOPHILS  0.50  0.30   --    ASTSGOT   --   20   --    ALTSGPT   --   30   --    ALKPHOSPHAT   --   84   --    TBILIRUBIN   --   0.7   --            Hemodynamics:  Temp (24hrs), Av.8 °C (100 °F), Min:37.8 °C (100 °F), Max:37.8 °C (100 °F)  Temperature: 37.8 °C (100 °F), Monitored Temp: 37.6 °C (99.7 °F)  Pulse  Av.3  Min: 40  Max: 216Heart Rate (Monitored): 72  NIBP: 158/54 mmHg     Respiratory:    Respiration: (!) 24, Pulse Oximetry: 98 %     Work Of Breathing / Effort: Mild  RUL Breath Sounds: Diminished, RML Breath Sounds: Diminished, RLL Breath Sounds: Diminished, DEV Breath Sounds: Diminished, LLL Breath Sounds: Diminished  Fluids:    Intake/Output Summary (Last 24 hours) at 17 1350  Last data filed at 17 1200   Gross per 24 hour   Intake   2046 ml   Output   2170 ml   Net   -124 ml     Weight: 69.6 kg (153 lb 7 oz)  GI/Nutrition:  No orders of the defined types were placed in this encounter.     Medical Decision Making, by Problem:  Active Hospital Problems    Diagnosis   • Cardiac arrest (CMS-HCC) [I46.9]   • Atrial fibrillation (CMS-HCC) [I48.91]   • ESRD (end stage renal disease) (CMS-HCC) [N18.6]   • Acute respiratory failure with hypoxia (CMS-[facility]) [J96.01]   • HTN (hypertension) [I10]   • IDDM (insulin dependent diabetes mellitus) (CMS-HCC) [E11.9, Z79.4]   • Dyslipidemia [E78.5]   • Cardiomyopathy (CMS-[facility]) [I42.9]   • Anemia  [D64.9]   • Elevated troponin [R74.8]       Medications reviewed and Labs reviewed                      Assessment and Plan  1.ESRD/HD  2.HTN: BP controlled  3.Electrolytes: well controlled.  4.Anemia: Hgb 8.1  5.S/p cardiac arrest: medical treatment   6.Volume:well controlled with UF/HD    No need for HD today  Epo with dialysis  Renal dose all meds  Avoid nephrotoxins  Prognosis guarded.  D/W Dr Castaneda

## 2017-06-06 NOTE — THERAPY
"Occupational Therapy Evaluation completed.   Functional Status:  Pt seen today for OT evaluation. Pt very pleasant and cooperative, family members translating for pt with pt permission. Pt requires modA for bed mobility. Fair-poor sitting balance, lateral lean left but then this improved and pt was better able to maintain midline. Pt able to complete grooming with Tonie supported. ModA to don/doff socks with 4 point position and extra time. Pt then participated in a stand pivot txf to BS recliner with modA x2. Pt very motivated, limited by weakness, fatigue, and impaired balance which impacts independence in ADLs and functional mobility.   Plan of Care: Will benefit from Occupational Therapy 3 times per week  Discharge Recommendations:  Equipment: Will Continue to Assess for Equipment Needs. Post-acute therapy Discharge to a transitional care facility for continued skilled therapy services.    See \"Rehab Therapy-Acute\" Patient Summary Report for complete documentation.    "

## 2017-06-06 NOTE — PROGRESS NOTES
Patient found in bed with Cortrak partially removed. Tube feed paused and patient assessed. VSS, patient not coughing. Arpit S to bedside to reinsert cortrak.

## 2017-06-06 NOTE — CATH LAB
Immediate Post-Operative Note      PreOp Diagnosis: Cardiac arrest.    PostOp Diagnosis: See below    Procedure(s) :  Coronary Angiography    Surgeon(s):  Avinash Wray M.D.    Type of Anesthesia: Moderate Sedation      Findings:   LM patent.  LAD diffuse distal disease.  Circumflex: Proximal 100% with late filling. Small vessel.  RCA: Ostial 50%. Mid 50%. PDA very small vessel proximal diffuse 90%. Diffuse posterolateral disease.    Complications: None.  Plan: Medical therapy.      Avinash Wray M.D.  6/5/2017 6:14 PM

## 2017-06-06 NOTE — PROGRESS NOTES
Cortrak Placement    Tube Team verified patient name and medical record number prior to tube placement.  Cortrak tube (43 inches, 10 German) placed at 75 cm in right nare.  Per Cortrak picture, tube appears to be in the stomach.  Nursing Instructions: Awaiting KUB to confirm placement before use for medications or feeding. Once placement confirmed, flush tube with 30 ml of water, and then remove and save stylet, in patient medication drawer.

## 2017-06-06 NOTE — PROGRESS NOTES
Pt dialyzed for 3 hrs today from 1647 to 1947.  Unable to UF full amt. D/t  Episodes of low BP.  Net UF 1.5L.  Art site bled x 12 mins post tx  And celina site bled x 8 ins post tx. Dry dsgs applied to avf sites and made sure  Bleeding stopped before leaving [facility].  See dialysis  Flow sheet for details.  Report given to GERARDO Luu RN.

## 2017-06-06 NOTE — PROGRESS NOTES
Pulmonary Critical Care Progress Note      Date of service: 6/6/2017    Chief Complaint: Cardiac Arrest    History of Present Illness: Patient is a 67 y/o F, history of end stage renal disease, Right AV fistula, chronic HD, DM, admitted through the ED on 5/27/2017 after out of hospital cardiac arrest asystole with ROSC after two rounds of epi. Intubated remains on full ventilator support but now awake and following commands in [place].     ROS:    Respiratory: negative,   Cardiac: negative,   GI: negative.        Interval Events:  24 hour interval history reviewed   Multi vessel coronary disease noted with cath yesterday.   Alert and oriented. No complaints of pain.  Facial tremors slightly worse than baseline   A fib,  systolic, Heparin gtts  Tylenol for Tmax 100   BM yesterday. 250 cc UOP   Passed swallow evaluation yesterday but choked on pill, now NPO.   Second swallow evaluation today.   2L NC. CXR indicates mild pulmonary edema and left lower lobe atelectasis versus effusion.   7 day Zyvox completed yesterday.   Transfer to [facility]:  No change.      Physical Exam  General: Sitting up in chair and conversing with daughter.   Neuro/Psych: Awake and alert, interactive with family. Following commands.  Mild facial resting tremor, unchanged. Moving all extremities symmetrically.   HEENT: PERRL. Right nare cortrak with TF at 55, Supple, midline, no crepitus. Right IJ CVC CDI. Mucous membranes are pink and moist.   CVS: Distant, RRR.   Respiratory:  Mild inspiratory squeak anteriorly. No crackles.    Abdomen/:  Soft. NT. Tolerating tube feed. Bowel sounds hypoactive but present. Patricia in place.   Extremities: Distal pulses 2+ bilaterally. Trace lower extremity edema. Right radial access site dressed with good perfusion to right hand.  Skin: Cool, dry, perfused. Capillary refill intact.       Respiratory:     Pulse Oximetry: 99 %  ImagingAvailable data reviewed         Invalid input(s): [id number]        HemoDynamics:  Pulse: 93, Heart Rate (Monitored): 90  NIBP: 142/51 mmHg    Imaging: Available data reviewed       ECHO 5/27: CONCLUSIONS  No prior study is available for comparison.   Normal left ventricular chamber size. Normal left ventricular wall thickness. Moderately reduced left ventricular systolic function. Left ventricular ejection fraction is visually estimated to be 35%. Normal   regional wall motion.   Grade II diastolic dysfunction.  Moderately dilated left atrium.  Moderate mitral regurgitation.  Estimated right ventricular systolic pressure is at least 45 mmHg.  IVC not visualized.      Neuro:  GCS Total Reed Coma Score: 15  Imaging: Available data reviewed    Fluids:  Intake/Output       06/04/17 0700 - 06/05/17 0659 06/05/17 0700 - 06/06/17 0659 06/06/17 0700 - 06/07/17 0659      0700-1859 4615-2599 Total 0700-1859 1900-0659 Total 8508-8791 3966-9905 Total       Intake    I.V.  120  120 240  120  324 444  --  -- --    Heparin Volume -- -- -- -- 204 204 -- -- --    IV Volume 120 120 240 120 120 240 -- -- --    Other  120  -- 120  --  -- --  --  -- --    Medications (P.O./ Enteral Liquids) 120 -- 120 -- -- -- -- -- --    Dialysis  --  -- --  --  500 500  --  -- --    Dialysis Volume (Dialysis Intake) -- -- -- -- 500 500 -- -- --    Enteral  1040  530 1570  180  720 900  --  -- --    Enteral Volume 660 330 990 -- 660 660 -- -- --    Free Water / Tube Flush 380 200 580 180 60 240 -- -- --    Total Intake 7511 241 2697 300 1544 1844 -- -- --       Output    Urine  110  45 155  345  35 380  --  -- --    Indwelling Cathether 110 45 155 345 35 380 -- -- --    Dialysis  --  -- --  --  2000 2000  --  -- --    Dialysis Output (Dialysis Output) -- -- -- -- 2000 2000 -- -- --    Stool  --  -- --  --  -- --  --  -- --    Number of Times Stooled -- 1 x 1 x -- -- -- -- -- --    Total Output 110 45  2385 -- -- --       Net I/O     7803 561 1849 -45 -491 -536 -- -- --        Weight: 69.6 kg (153  lb 7 oz)  Recent Labs      17   SODIUM  132*  129*  132*   POTASSIUM  4.9  4.7  4.4   CHLORIDE  92*  88*  93*   CO2  31  30  31   BUN  50*  74*  46*   CREATININE  3.35*  4.88*  3.28*   CALCIUM  9.3  9.5  9.0     GI/Nutrition:  Imaging: Available data reviewed  NPO and tube feed Tolerated at 40.     Liver Function:  Recent Labs      17   ALTSGPT   --   30   --    ASTSGOT   --   20   --    ALKPHOSPHAT   --   84   --    TBILIRUBIN   --   0.7   --    PREALBUMIN   --   23.0   --    GLUCOSE  250*  105*  159*     Heme:  Recent Labs      17   1155  17   RBC  3.23*   --   2.98*  3.14*   HEMOGLOBIN  8.8*   --   8.1*  8.5*   HEMATOCRIT  28.3*   --   25.6*  26.9*   PLATELETCT  183   --   191  196   APTT  80.2*  67.4*  62.0*  67.7*     Infectious Disease:  Monitored Temp  Av.2 °C (98.9 °F)  Min: 36.8 °C (98.2 °F)  Max: 37.8 °C (100 °F)  Micro: reviewed   Day  Zyvox.   Recent Labs      17   WBC  7.9  7.9  8.1   NEUTSPOLYS  73.80*  65.40   --    LYMPHOCYTES  13.60*  21.80*   --    MONOCYTES  7.10  8.50   --    EOSINOPHILS  1.60  1.60   --    BASOPHILS  0.50  0.30   --    ASTSGOT   --   20   --    ALTSGPT   --   30   --    ALKPHOSPHAT   --   84   --    TBILIRUBIN   --   0.7   --      Current Facility-Administered Medications   Medication Dose Frequency Provider Last Rate Last Dose   • insulin NPH (HUMULIN,NOVOLIN) injection 8 Units  8 Units BID INSULIN Macario Jo M.D.   8 Units at 06/06/17 0819   • risperidone (RISPERDAL) tablet 0.5 mg  0.5 mg HS PRN Macario Jo M.D.   0.5 mg at 17   • lidocaine-prilocaine (EMLA) 2.5-2.5 % cream   DIALYSIS PRN Ricardo Petersen M.D.       • omeprazole 2 mg/mL in sodium bicarbonate (PRILOSEC) oral susp 40 mg  40 mg DAILY Carlos Manuel Man M.D.   40 mg at 17   •  oxycodone immediate-release (ROXICODONE) tablet 5 mg  5 mg Q6HRS PRN Carlos Manuel Man M.D.   5 mg at 06/05/17 1046   • prochlorperazine (COMPAZINE) injection 10 mg  10 mg Q6HRS PRN Jeremy M Gonda, M.D.   10 mg at 06/04/17 2349   • albumin human 25% solution 12.5 g  12.5 g Q HOUR PRN Ricardo Petersen M.D.   Stopped at 06/02/17 0715   • carvedilol (COREG) tablet 6.25 mg  6.25 mg BID WITH MEALS Carlos Manuel Man M.D.   6.25 mg at 06/06/17 0819   • insulin lispro (HUMALOG) injection 3-14 Units  3-14 Units Q6HRS Carlos Manuel Man M.D.   4 Units at 06/06/17 0618   • NS (BOLUS) infusion 250 mL  250 mL DIALYSIS PRN Ricardo Petersen M.D.       • epoetin micheal (EPOGEN,PROCRIT) injection 10,000 Units  10,000 Units MO, WE +  Ricardo Petersen M.D.   10,000 Units at 06/05/17 1930   • NS (BOLUS) infusion 250 mL  250 mL DIALYSIS PRN Ricardo Petersen M.D.       • heparin 1000 units/mL injection 2,600 Units  2,600 Units PRN Ricardo Castaneda M.D.   2,600 Units at 06/03/17 2153    And   • heparin infusion 25,000 units in 500 ml 0.45% nacl   Continuous Ricardo Castaneda M.D. 17 mL/hr at 06/06/17 0714 850 Units/hr at 06/06/17 0714   • aspirin EC (ECOTRIN) tablet 81 mg  81 mg DAILY Mulu Bryant M.D.   81 mg at 06/06/17 0818   • atorvastatin (LIPITOR) tablet 40 mg  40 mg QHS Mulu Bryant M.D.   40 mg at 06/05/17 2047   • amiodarone (CORDARONE) tablet 200 mg  200 mg TWICE DAILY Mulu Bryant M.D.   200 mg at 06/06/17 0819   • [START ON 6/13/2017] amiodarone (CORDARONE) tablet 200 mg  200 mg Q DAY Mulu Bryant M.D.       • acetaminophen (TYLENOL) suppository 650 mg  650 mg Q6HRS PRN Dragan Martinez M.D.   650 mg at 05/28/17 0444   • Respiratory Care per Protocol   Continuous RT Dragan Martinez M.D.       • senna-docusate (PERICOLACE or SENOKOT S) 8.6-50 MG per tablet 2 Tab  2 Tab BID Dragan Martinez M.D.   Stopped at 06/04/17 2100    And   • polyethylene glycol/lytes (MIRALAX) PACKET 1 Packet  1 Packet QDAY PRN Dragan Viveros  WALESKA Harman   1 Packet at 05/30/17 2121    And   • magnesium hydroxide (MILK OF MAGNESIA) suspension 30 mL  30 mL QDAY PRN Dragan Martinez M.D.        And   • bisacodyl (DULCOLAX) suppository 10 mg  10 mg QDAY PRN Dragan Martinez M.D.       • lidocaine (XYLOCAINE) 1%  injection  1-2 mL Q30 MIN PRN Dragan Martinez M.D.   2 mL at 06/02/17 0028   • NS infusion   Continuous Dragan Martinez M.D. 10 mL/hr at 06/04/17 2038     • ipratropium-albuterol (DUONEB) nebulizer solution 3 mL  3 mL Q2HRS PRN (RT) Dragan Martinez M.D.       • enalaprilat (VASOTEC) injection 1.25 mg  1.25 mg Q6HRS PRN Dragan Sampson D.O.       • labetalol (NORMODYNE,TRANDATE) injection 10 mg  10 mg Q4HRS PRN Dragan Sampson D.O.       • ondansetron (ZOFRAN) syringe/vial injection 4 mg  4 mg Q4HRS PRN Dragan Sampson D.O.   4 mg at 06/05/17 1256   • ondansetron (ZOFRAN ODT) dispertab 4 mg  4 mg Q4HRS PRN Dragan Sampson D.O.       • acetaminophen (TYLENOL) tablet 650 mg  650 mg Q6HRS PRN Dragan Sampson D.O.   650 mg at 06/06/17 0819   • glucose 4 g chewable tablet 16 g  16 g Q15 MIN PRN Dragan Sampson D.O.        And   • dextrose 50% (D50W) injection 25 mL  25 mL Q15 MIN PRN Dragan Sampson D.O.         This patient's medications have not been reviewed.    Quality  Measures:  Labs reviewed, Medications reviewed and Radiology images reviewed                    Assessment and Plan:  Ventilator-dependent respiratory failure.   - Intubated AM 5/27   - doing well after extubation    - IS, RT protocols  Abnormal CXR - RLL ATX/pna   - zyvox   Status post witnessed out-of-hospital asystolic cardiac arrest.    - Cardiology following- primary  Cardiac arrest/NSTEMI?/valvular heart Dz/myop   - Cath noted   - correct lytes  Cardiomyopathy - EF 35%   - Grade II DD  PHTN - mild-moderate RVSP 45   - Secondary to MR? Moderate by ECHO  Encephalopathy   - improved; follows   - speaks Portuguese; family assists  Anemia with  thrombocytopenia.   - Serial lab - transfuse PRN with restrictive threshold  End-stage renal disease, on maintenance hemodialysis.   - Renal following   - HD daily  Diabetes mellitus - glycemic control  Enteral nutrrition - Cortrak  ERP  Mobilize  Prophylaxis   Daily SBTs after dialysis   heparin gtt.     Discontinue Zyvox   Discontinue daily CXR  Transfer to tele 6/6      Discussed patient condition and risk of morbidity and/or mortality with Family, RN, RT, Pharmacy, Charge nurse / hot rounds and cardiology and nephrology.    The patient remains critically ill.  Critical care time = 31 minutes in directly providing and coordinating critical care and extensive data review.  No time overlap and excludes procedures.    Diamond VALDEZ (Tad), am scribing for, and in the presence of, Carlos Manuel Man M.D.  Electronically signed by: Diamond Scott (Tad), 6/6/2017  Carlos Manuel VALDEZ M.D. personally performed the services described in this documentation, as scribed by Diamond Scott in my presence, and it is both accurate and complete.

## 2017-06-06 NOTE — PROGRESS NOTES
[facility] Hospitalist Progress Note    Date of Service: 2017    Chief Complaint  66 y.o. female admitted 2017 with cardiac arrest.     Interval Problem Update  Ms. Thompson has a hx of IDDM, ESRD, and HTN that experienced an out of hospital arrest with ROSC.       She went into afib and was placed on a heparin drip and remains in afib.  She had a cardiac cath showing multi-vessel dz with medical management.  She failed swallow eval yesterday.  RN notes ongoing twitch of the mouth.   I discussed with her family about LTACH.   Consultants/Specialty  Pulmonology  Cardiology  Nephrology  I discussed with Dr. Dempsey this morning about cath results. Query Life Vest.  I discussed with Dr. Man on Hot Rounds.   Disposition  LTACH referral for [place] as discussed with .         ROS   Physical Exam  Laboratory/Imaging   Hemodynamics  No data recorded.   Monitored Temp: 37.8 °C (100 °F)  Pulse  Av.5  Min: 40  Max: 110 Heart Rate (Monitored): 90  NIBP: 142/51 mmHg      Respiratory      Respiration: (!) 22, Pulse Oximetry: 99 %, O2 Daily Delivery Respiratory : Silicone Nasal Cannula     Work Of Breathing / Effort: Mild  RUL Breath Sounds: Diminished, RML Breath Sounds: Diminished, RLL Breath Sounds: Diminished, DEV Breath Sounds: Diminished, LLL Breath Sounds: Diminished    Fluids    Intake/Output Summary (Last 24 hours) at 17 0807  Last data filed at 17 0600   Gross per 24 hour   Intake   1764 ml   Output   2130 ml   Net   -366 ml       Nutrition  No orders of the defined types were placed in this encounter.     Physical Exam   Constitutional: No distress.   HENT:   cortrak   Eyes: No scleral icterus.   Neck:   Central line.    Cardiovascular:   No murmur heard.  Sinus rhythm.    Pulmonary/Chest: No respiratory distress. She has no wheezes.   Abdominal: Soft. She exhibits no distension. There is no tenderness.   Musculoskeletal: She exhibits edema. She exhibits no tenderness.   Fistula with  a thrill.    Neurological:   Gyrations of the lips. Awake, alert, follows some commands. +confusion.    Skin: There is pallor.   Scab right upper chest.        Recent Labs      06/04/17   0255  06/05/17   0400  06/06/17   0059   WBC  7.9  7.9  8.1   RBC  3.23*  2.98*  3.14*   HEMOGLOBIN  8.8*  8.1*  8.5*   HEMATOCRIT  28.3*  25.6*  26.9*   MCV  87.6  85.9  85.7   MCH  27.2  27.2  27.1   MCHC  31.1*  31.6*  31.6*   RDW  56.2*  54.6*  54.3*   PLATELETCT  183  191  196   MPV  10.0  10.5  9.9     Recent Labs      06/04/17   0255  06/05/17   0400 06/06/17   0059   SODIUM  132*  129*  132*   POTASSIUM  4.9  4.7  4.4   CHLORIDE  92*  88*  93*   CO2  31  30  31   GLUCOSE  250*  105*  159*   BUN  50*  74*  46*   CREATININE  3.35*  4.88*  3.28*   CALCIUM  9.3  9.5  9.0     Recent Labs      06/04/17   1155  06/05/17   0400  06/06/17   0059   APTT  67.4*  62.0*  67.7*                  Assessment/Plan     Cardiac arrest (CMS-HCC) (present on admission)  Assessment & Plan  With return of spontaneous circulation.   Angiogram results:  Findings:    LM patent.  LAD diffuse distal disease.  Circumflex: Proximal 100% with late filling. Small vessel.  RCA: Ostial 50%. Mid 50%. PDA very small vessel proximal diffuse 90%. Diffuse posterolateral disease    ESRD (end stage renal disease) (CMS-HCC) (present on admission)  Assessment & Plan  HD MWF per nephrology discussed with Dr Najjar    Acute respiratory failure with hypoxia (CMS-HCC) (present on admission)  Assessment & Plan  extubated 6-2   RT protocol  Encourage IS use and mobilization   Repeat swallow eval.     Atrial fibrillation (CMS-HCC) (present on admission)  Assessment & Plan  Paroxysmal, continue amiodarone via cortrak and coreg   heparin drip, coumadin started.   Cardiology following     Anemia (present on admission)  Assessment & Plan  No signs of active bleed, monitor  continue Procrit       IDDM (insulin dependent diabetes mellitus) (CMS-HCC) (present on  admission)  Assessment & Plan  Continue   NPH and  ISS monitor cbg's  and adjust accordingly     Dyslipidemia (present on admission)  Assessment & Plan  lipitor    Cardiomyopathy (CMS-HCC) (present on admission)  Assessment & Plan  Ischemic.   EF35%    Continue medical management and fluid management with HD  Carvedilol and lisinopril.     Aspiration pneumonia (CMS-HCC) (present on admission)  Assessment & Plan  Cx MRSA  Completed 7 days of zyvox   Also Completed 5 day course ceft flagyl for aspiration Pna         Delirium  Assessment & Plan  resolved      Labs reviewed and Medications reviewed    Central line in place: Need for access    DVT Prophylaxis: Heparin and Warfarin (Coumadin)

## 2017-06-06 NOTE — CARE PLAN
Problem: Safety  Goal: Will remain free from injury  Outcome: PROGRESSING AS EXPECTED  Patient will remain free from injury during my shift. Call light within reach, needs assessed, bed alarm set.

## 2017-06-06 NOTE — THERAPY
"Physical Therapy Evaluation completed.   Bed Mobility:  Supine to Sit: Moderate Assist (HHA to pull self to EOB)  Transfers: Sit to Stand: Moderate Assist (x2 to stand w/ HHA)  Gait: Level Of Assist: Unable to Participate with No Equipment Needed       Plan of Care: Will benefit from Physical Therapy 3 times per week  Discharge Recommendations: Equipment: Front-Wheel Walker. Post-acute therapy Discharge to a transitional care facility for continued skilled therapy services.    See \"Rehab Therapy-Acute\" Patient Summary Report for complete documentation.     "

## 2017-06-06 NOTE — PROGRESS NOTES
Monitor summary: In/out of A-fib and sinus rhytm. A-fib rate controlled from 60-80. Currently Sinus rhythm. Pr 0.18 QRS 0.08, QT 0.44

## 2017-06-06 NOTE — THERAPY
"Speech Language Therapy dysphagia treatment completed.   Functional Status:  Patient was seen for dysphagia re-assess this date. Patient was upright in bed with family at bedside to interpret. Patient consumed PO trials of NTL via tsp. Patient demonstrated increased weakness this date with poor endurance for therapy with easy fatigue. Patient with prolonged oral stage, delayed onset of swallow with excessive tongue pumping, decreased and weak laryngeal elevation and hyolarngeal excursion were noted upon palpation. Delayed throat clearing was noted throughout trials. At this time recommend continue NPO with Cortrak with pre feeding trials with SLP only. Discussed with RN.   Recommendations: Continue NPO/Cortrak, prefeeding with SLP only.    Plan of Care: Will benefit from Speech Therapy 3 times per week  Post-Acute Therapy: Discharge to a transitional care facility for continued skilled therapy services. and Discharge to home with outpatient or home health for additional skilled therapy services.    See \"Rehab Therapy-Acute\" Patient Summary Report for complete documentation.     "

## 2017-06-07 VITALS
WEIGHT: 119.05 LBS | TEMPERATURE: 96.8 F | RESPIRATION RATE: 21 BRPM | SYSTOLIC BLOOD PRESSURE: 131 MMHG | BODY MASS INDEX: 21.91 KG/M2 | DIASTOLIC BLOOD PRESSURE: 48 MMHG | HEIGHT: 62 IN | HEART RATE: 80 BPM | OXYGEN SATURATION: 96 %

## 2017-06-07 LAB
ANION GAP SERPL CALC-SCNC: 13 MMOL/L (ref 0–11.9)
ANISOCYTOSIS BLD QL SMEAR: ABNORMAL
APTT PPP: 114.8 SEC (ref 24.7–36)
APTT PPP: 34.5 SEC (ref 24.7–36)
BASOPHILS # BLD AUTO: 0.3 % (ref 0–1.8)
BASOPHILS # BLD: 0.03 K/UL (ref 0–0.12)
BUN SERPL-MCNC: 91 MG/DL (ref 8–22)
CALCIUM SERPL-MCNC: 8.8 MG/DL (ref 8.5–10.5)
CHLORIDE SERPL-SCNC: 92 MMOL/L (ref 96–112)
CO2 SERPL-SCNC: 26 MMOL/L (ref 20–33)
COMMENT 1642: NORMAL
CREAT SERPL-MCNC: 5.09 MG/DL (ref 0.5–1.4)
EOSINOPHIL # BLD AUTO: 0.15 K/UL (ref 0–0.51)
EOSINOPHIL NFR BLD: 1.7 % (ref 0–6.9)
ERYTHROCYTE [DISTWIDTH] IN BLOOD BY AUTOMATED COUNT: 57 FL (ref 35.9–50)
GFR SERPL CREATININE-BSD FRML MDRD: 8 ML/MIN/1.73 M 2
GLUCOSE BLD-MCNC: 148 MG/DL (ref 65–99)
GLUCOSE BLD-MCNC: 150 MG/DL (ref 65–99)
GLUCOSE BLD-MCNC: 163 MG/DL (ref 65–99)
GLUCOSE BLD-MCNC: 198 MG/DL (ref 65–99)
GLUCOSE BLD-MCNC: 229 MG/DL (ref 65–99)
GLUCOSE SERPL-MCNC: 141 MG/DL (ref 65–99)
HCT VFR BLD AUTO: 25.3 % (ref 37–47)
HGB BLD-MCNC: 8 G/DL (ref 12–16)
IMM GRANULOCYTES # BLD AUTO: 0.07 K/UL (ref 0–0.11)
IMM GRANULOCYTES NFR BLD AUTO: 0.8 % (ref 0–0.9)
INR PPP: 1.05 (ref 0.87–1.13)
LYMPHOCYTES # BLD AUTO: 1.99 K/UL (ref 1–4.8)
LYMPHOCYTES NFR BLD: 22.5 % (ref 22–41)
MCH RBC QN AUTO: 27.7 PG (ref 27–33)
MCHC RBC AUTO-ENTMCNC: 31.6 G/DL (ref 33.6–35)
MCV RBC AUTO: 87.5 FL (ref 81.4–97.8)
MICROCYTES BLD QL SMEAR: ABNORMAL
MONOCYTES # BLD AUTO: 0.54 K/UL (ref 0–0.85)
MONOCYTES NFR BLD AUTO: 6.1 % (ref 0–13.4)
MORPHOLOGY BLD-IMP: NORMAL
NEUTROPHILS # BLD AUTO: 6.08 K/UL (ref 2–7.15)
NEUTROPHILS NFR BLD: 68.6 % (ref 44–72)
NRBC # BLD AUTO: 0.04 K/UL
NRBC BLD AUTO-RTO: 0.5 /100 WBC
OVALOCYTES BLD QL SMEAR: NORMAL
PLATELET # BLD AUTO: 155 K/UL (ref 164–446)
PLATELET BLD QL SMEAR: NORMAL
PMV BLD AUTO: 10.2 FL (ref 9–12.9)
POIKILOCYTOSIS BLD QL SMEAR: NORMAL
POTASSIUM SERPL-SCNC: 5.3 MMOL/L (ref 3.6–5.5)
PROTHROMBIN TIME: 14 SEC (ref 12–14.6)
RBC # BLD AUTO: 2.89 M/UL (ref 4.2–5.4)
RBC BLD AUTO: PRESENT
SCHISTOCYTES BLD QL SMEAR: NORMAL
SODIUM SERPL-SCNC: 131 MMOL/L (ref 135–145)
WBC # BLD AUTO: 8.9 K/UL (ref 4.8–10.8)

## 2017-06-07 PROCEDURE — A9270 NON-COVERED ITEM OR SERVICE: HCPCS | Performed by: INTERNAL MEDICINE

## 2017-06-07 PROCEDURE — 700102 HCHG RX REV CODE 250 W/ 637 OVERRIDE(OP): Performed by: INTERNAL MEDICINE

## 2017-06-07 PROCEDURE — 99291 CRITICAL CARE FIRST HOUR: CPT | Performed by: INTERNAL MEDICINE

## 2017-06-07 PROCEDURE — 700102 HCHG RX REV CODE 250 W/ 637 OVERRIDE(OP): Performed by: HOSPITALIST

## 2017-06-07 PROCEDURE — A9270 NON-COVERED ITEM OR SERVICE: HCPCS | Performed by: HOSPITALIST

## 2017-06-07 PROCEDURE — 85025 COMPLETE CBC W/AUTO DIFF WBC: CPT

## 2017-06-07 PROCEDURE — 80048 BASIC METABOLIC PNL TOTAL CA: CPT

## 2017-06-07 PROCEDURE — 700111 HCHG RX REV CODE 636 W/ 250 OVERRIDE (IP): Performed by: HOSPITALIST

## 2017-06-07 PROCEDURE — 85610 PROTHROMBIN TIME: CPT

## 2017-06-07 PROCEDURE — 90935 HEMODIALYSIS ONE EVALUATION: CPT

## 2017-06-07 PROCEDURE — 99239 HOSP IP/OBS DSCHRG MGMT >30: CPT | Performed by: HOSPITALIST

## 2017-06-07 PROCEDURE — 85730 THROMBOPLASTIN TIME PARTIAL: CPT

## 2017-06-07 PROCEDURE — 82962 GLUCOSE BLOOD TEST: CPT

## 2017-06-07 PROCEDURE — 700111 HCHG RX REV CODE 636 W/ 250 OVERRIDE (IP): Performed by: INTERNAL MEDICINE

## 2017-06-07 RX ORDER — LORAZEPAM 1 MG/1
1 TABLET ORAL ONCE
Status: COMPLETED | OUTPATIENT
Start: 2017-06-07 | End: 2017-06-07

## 2017-06-07 RX ORDER — LISINOPRIL 5 MG/1
5 TABLET ORAL
Status: DISCONTINUED | OUTPATIENT
Start: 2017-06-07 | End: 2017-06-07 | Stop reason: HOSPADM

## 2017-06-07 RX ADMIN — AMIODARONE HYDROCHLORIDE 200 MG: 200 TABLET ORAL at 08:10

## 2017-06-07 RX ADMIN — ERYTHROPOIETIN 10000 UNITS: 10000 INJECTION, SOLUTION INTRAVENOUS; SUBCUTANEOUS at 07:01

## 2017-06-07 RX ADMIN — RISPERIDONE 1 MG: 1 TABLET, FILM COATED ORAL at 00:33

## 2017-06-07 RX ADMIN — CARVEDILOL 6.25 MG: 6.25 TABLET, FILM COATED ORAL at 09:10

## 2017-06-07 RX ADMIN — INSULIN HUMAN 8 UNITS: 100 INJECTION, SUSPENSION SUBCUTANEOUS at 05:18

## 2017-06-07 RX ADMIN — LORAZEPAM 1 MG: 1 TABLET ORAL at 18:19

## 2017-06-07 RX ADMIN — ONDANSETRON 4 MG: 2 INJECTION INTRAMUSCULAR; INTRAVENOUS at 13:40

## 2017-06-07 RX ADMIN — INSULIN LISPRO 3 UNITS: 100 INJECTION, SOLUTION INTRAVENOUS; SUBCUTANEOUS at 12:32

## 2017-06-07 RX ADMIN — HEPARIN SODIUM 2600 UNITS: 1000 INJECTION, SOLUTION INTRAVENOUS; SUBCUTANEOUS at 07:22

## 2017-06-07 RX ADMIN — INSULIN LISPRO 4 UNITS: 100 INJECTION, SOLUTION INTRAVENOUS; SUBCUTANEOUS at 18:22

## 2017-06-07 RX ADMIN — ASPIRIN 81 MG: 81 TABLET ORAL at 08:11

## 2017-06-07 RX ADMIN — STANDARDIZED SENNA CONCENTRATE AND DOCUSATE SODIUM 2 TABLET: 8.6; 5 TABLET, FILM COATED ORAL at 08:11

## 2017-06-07 RX ADMIN — OMEPRAZOLE 40 MG: 20 CAPSULE, DELAYED RELEASE ORAL at 08:10

## 2017-06-07 RX ADMIN — OXYCODONE HYDROCHLORIDE 5 MG: 5 TABLET ORAL at 13:40

## 2017-06-07 RX ADMIN — WARFARIN SODIUM 4 MG: 4 TABLET ORAL at 18:19

## 2017-06-07 RX ADMIN — CARVEDILOL 6.25 MG: 6.25 TABLET, FILM COATED ORAL at 18:19

## 2017-06-07 RX ADMIN — LISINOPRIL 5 MG: 5 TABLET ORAL at 09:10

## 2017-06-07 ASSESSMENT — ENCOUNTER SYMPTOMS
COUGH: 0
FEVER: 0
NAUSEA: 0
VOMITING: 0
WEIGHT LOSS: 0
SHORTNESS OF BREATH: 0

## 2017-06-07 ASSESSMENT — PAIN SCALES - GENERAL
PAINLEVEL_OUTOF10: 5
PAINLEVEL_OUTOF10: 1
PAINLEVEL_OUTOF10: 0

## 2017-06-07 ASSESSMENT — LIFESTYLE VARIABLES: EVER_SMOKED: NEVER

## 2017-06-07 NOTE — DISCHARGE PLANNING
Call from son for patient.  Family has decided on [facility] in [place] area.  Received verbal Auth form Son to move forward with [facility] transfer.     Call to Yuliana at [facility] and left message regarding same.  Request to Hospitalist to dictate.      Plan: Wait on call back from [facility] in order to schedule transport.

## 2017-06-07 NOTE — PROCEDURES
DATE OF PROCEDURE:  06/05/2017    PROCEDURE:  Cardiac catheterization.  A. Selective coronary angiography.  B. Right radial artery approach    PRE-PROCEDURE DIAGNOSES:  1. Cardiac arrest.  2. Non-ST elevation myocardial infarction.  3. Diabetes mellitus.  4. End-stage renal disease, on hemodialysis.  5. Cardiomyopathy, ejection fraction 35%.    POST-PROCEDURE DIAGNOSIS:  Three-vessel coronary artery disease, not amenable   to percutaneous or surgical bypass graft revascularization.    PHYSICIAN:  Avinash Wray MD    REFERRING PHYSICIAN:  Eric Dempsey MD    COMPLICATIONS:  None.    MEDICATIONS:  1. Versed 1 mg IV.  2. Fentanyl 50 mcg IV.  3. Lidocaine 2% subcutaneous.  4. Heparin 3000 units IA.  5. Nitroglycerin 100 mcg IA.  6. Verapamil 2.5 mg IA.  7. Nitroglycerin 300 mcg intra-arterial.    INDICATIONS: The patient has a 66-year-old female with a history of end-stage renal   disease, on hemodialysis; diabetes mellitus; hypertension who suffered a cardiac   pulseless electrical activity arrest requiring CPR and intubation.    Serial troponin levels were consistent with a non-ST elevation myocardial   infarction.  EKG on 05/27/2017, showed normal sinus rhythm with lateral ST segment   Depression. Echocardiogram on 05/27/2017, showed a left ventricular ejection   fraction of 35% and a right ventricular pressure of 45 mmHg.  The patient is   undergoing a diagnostic cardiac catheterization with a consideration of   therapeutic coronary intervention.    PROCEDURE:  After an informed consent had been obtained, the patient was   brought to the cardiac catheterization laboratory.    The patient was prepped, draped, and anesthetized in usual manner.    After establishing the presence of adequate collateral circulation to the   right hand with a pressure and oximetry-guided Forest's test, the volar surface   of the right wrist was prepped, draped, and anesthetized in the usual manner.    Using a modified  Seldinger technique, a 4-German x 10 cm introducer sheath was   inserted in the right radial artery.  Heparin, verapamil and nitroglycerin   were given via the side port.    Next, a 4-German JL 3.5 left coronary catheter was inserted in the ostium of   the left coronary artery and left coronary angiograms were obtained in various   projections.    All subsequent catheter exchanges were done over an exchange length 0.035 J-tipped   exchange length wire.    Next, a 4-German JR 4.0 right coronary catheter was inserted in the ostium of   the right coronary and right coronary angiograms were obtained in various   projections.    Next, a 4-German pigtail catheter was inserted, but could not be advanced   beyond the innominate artery due to vasospasm of the right brachial and radial   artery.  Additional intra-arterial nitroglycerin was given; however, this   still did not allow advancement of the pigtail catheter.  It was elected to   terminate the case.    All catheters were removed and hemostasis was achieved   with a wrist band device.  Patient tolerated the procedure well.    FINDINGS:  HEMODYNAMICS:  Central aortic pressure systolic 89 diastolic 36, mean of   56.    CORONARY ARTERIOGRAPHY:  1. Left main artery:  Left main vessel is moderate-to-large caliber vessel,   angiographically widely patent with mild smooth atheroma.  Left main artery   bifurcates to the left anterior descending artery and circumflex artery.  2. Left anterior descending artery:  The LAD is a small caliber vessel and   gives rise to 3 very small caliber diffusely diseased diagonal branches and   normal complement of septal branches and extends around the apex.  The   proximal and mid LAD has diffuse smooth mild noncritical atheroma.  The entire   distal LAD is a small caliber, with severe diffuse obstructive disease throughout   the remainder of its course.  3. Circumflex artery:  The circumflex has a proximal 100% occlusion.  The the    circumflex vessel is filled by collaterals with incomplete late filling of the distal vessel,   which is a very tiny caliber vessel.    4. Right coronary artery:  The RCA gives rise to a long posterior descending   artery and a bifurcating posterolateral branch.  The RCA ostium has a 50%   stenosis, mid vessel has a focal 40-50% stenosis.  The posterior descending   artery is a threadlike caliber vessel with 90% diffuse proximal disease.    Both posterolateral branches are small caliber with severe diffuse obstructive disease   throughout its entire course.    PLAN:    1. Optimal medical therapy.  2. The patient is not a candidate for either percutaneous intervention or   surgical revascularization.       ____________________________________     MD ROSEANNA VÁSQUEZ / ARGELIA    DD:  06/07/2017 04:48:27  DT:  06/07/2017 06:21:59    D#:  [id number]  Job#:  [id number]

## 2017-06-07 NOTE — PROGRESS NOTES
Lab called with critical result of .8 at 1500. Critical lab result read back to Dr. Castaneda.   Dr. Castaneda notified of critical lab result at 1538.  Critical lab result read back by Dr. Castaneda.

## 2017-06-07 NOTE — PROGRESS NOTES
Patient to be picked up at 1845 for transfer to [facility]. Per Dr. Castaneda stop heparin drip and tube feeding prior to discharge. Notified of .8, per Dr. Leonel kaminski to stop heparin drip now.

## 2017-06-07 NOTE — DISCHARGE PLANNING
"[facility] requesting 1845 pickup time.  Requested [facility] form to include \"Closest accepting facility.\"   Re- faxed to [facility] with adjusted pickup time and added note.  15:23  "

## 2017-06-07 NOTE — DISCHARGE SUMMARY
DATE OF ADMISSION:  05/27/2017    DATE OF DISCHARGE:  06/07/2017    DISCHARGE DIAGNOSES:  1.  Out-of-hospital cardiac arrest.  2.  Ischemic cardiomyopathy.  3.  Three-vessel coronary artery disease.  4.  End-stage renal disease, on chronic dialysis.  5.  Insulin-dependent diabetes mellitus.  6.  Anemia secondary to chronic kidney disease.  7.  Hyponatremia.  8.  Acute encephalopathy.  9.  Paroxysmal atrial fibrillation.  10.  Dyslipidemia.  11.  Status post aspiration pneumonitis.    CONSULTATIONS:  Dr. Mooney, pulmonology was consulted; Dr. Bear,   nephrology was consulted; Dr. Stewart, cardiology was consulted.    PROCEDURES:  She had a central line placement and a diagnostic and therapeutic   bronchoscopy on 05/28/2017.  She went to cardiac cath lab on 06/05/2017   showing 3-vessel coronary artery disease, not amenable to percutaneous or   surgical bypass graft intervention, showed ejection fraction of 35%.  The LAD   had distal disease, circumflex was 100% occluded.  The right coronary artery   had ostial 50% lesion and mid 50% lesion and PDA showed a very small vessel   proximal disease at 90% with diffuse posterolateral disease.    IMAGING STUDIES:  Ejection fraction by echocardiogram 05/27/2017 showed   ejection fraction of 35% with right heart pressure of 45 mmHg.  A CAT scan of   the head showed mild size chronic-appearing infarct in right caudate head and   basal ganglia with mild diffuse cerebral and cerebellar substance loss, also   showed acute-on-chronic sinusitis.  A CTA of the chest showed consolidation in   the right lower pulmonary lobes could be pneumonia or consolidative   atelectasis, did not show any evidence of pulmonary embolism.    LABORATORY DATA:  Hemoglobin is 8, platelet count 155,000.  Creatinine is 5   today with sodium of 131.  She has normal liver function tests.  Hepatitis   panel was negative.  Thyroid studies were normal with TSH of 0.53 and a free   T4 of 0.9.  Bronchial washings  revealed MRSA.    HOSPITAL COURSE:  On 05/27/2017, this 66-year-old female patient of Dr. Stanley Aguayo, whose phone number is [phone], with a history of   insulin-dependent diabetes mellitus and end-stage renal disease, on   hemodialysis was brought in with out-of-hospital arrest.  She had CPR and was   resuscitated with return of spontaneous circulation.  She was deemed not to be   a hypothermia candidate, as she had purposeful movements.  She underwent   dialysis, maintained on the ventilator.  Bronchoscopy revealed MRSA;   therefore, she was treated with 7 days of Zyvox and she was also treated with   a 5-day course of Rocephin and Flagyl for aspiration pneumonitis.  After   extubation, she underwent cardiac catheterization by Dr. Wray showing   diffuse disease, not amenable to surgery or intervention; therefore, this is   being managed medically.    The patient has remained encephalopathic, though improving.  She remains on   supplemental oxygen.  She is now stable for transfer to a long-term acute care   hospital.  Family is agreeable to this.    The patient developed paroxysmal atrial fibrillation while in the intensive   care unit.  She has been placed on amiodarone.  She is currently in sinus   rhythm.  She has been started on a heparin drip because of renal failure as   well as Coumadin.  I do believe it is safe for her to be off her heparin drip   for transfer, as she does not have a deep venous thrombosis or pulmonary   embolism, and it can be restarted once she gets to her long-term acute care   hospital.    DISCHARGE MEDICATIONS:  1.  Amiodarone 200 mg twice a day.  This will be tapered accordingly over the   next few weeks.  Hopefully, she will be able to get off the amiodarone.  2.  Aspirin 81 mg daily.  3.  Lipitor 40 mg daily.  4.  Coreg 6.25 mg twice a day.  5.  Epogen 10,000 units Monday, Wednesday, and Friday with dialysis.  6.  Humalog sliding scale.  7.  NPH insulin 8 units twice  a day.  8.  Lisinopril 5 mg daily.  9.  Coumadin varying dose based on her INR.  Her INR today is 1.05.  10.  Omeprazole 20 mg oral daily.  11.  Senokot 1 tablet twice a day for constipation.    DISCHARGE RECOMMENDATIONS:  The patient would benefit from outpatient   cardiology followup upon discharge from the long-term acute care facility.  As   noted above, she will have aggressive medical management of her coronary   artery disease and ongoing dialysis Monday, Wednesday, and Fridays.    Thirty five minutes were spent in discharge arrangements.       ____________________________________     MD STEVE BENDER / ARGELIA    DD:  06/07/2017 13:34:38  DT:  06/07/2017 14:34:52    D#:  [id number]  Job#:  [id number]

## 2017-06-07 NOTE — PROGRESS NOTES
12 hour chart check completed.  Bedside report received from LEXI Contreras.  Pt resting comfortably in cardiac chair, attached to monitor, call light within reach, family at bedside.

## 2017-06-07 NOTE — PROGRESS NOTES
Inpatient Anticoagulation Service Note    Date: 6/7/2017  Reason for Anticoagulation: Atrial Fibrillation   [id number] VASc Score: 6    Hemoglobin Value: 8  Hematocrit Value: 25.3  Lab Platelet Value: 155 (Reviewed by Clinical .)  Target INR: 2.0 to 3.0    INR from last 7 days     Date/Time INR Value    06/07/17 0445 1.05    06/06/17 1300 1.08        Dose from last 7 days     None        Average Dose (mg): 4  Significant Interactions: Amiodarone, Aspirin, Proton Pump Inhibitor, Statin  Bridge Therapy: Yes (Heparin gtt)  Date of Last VTE Event:  (n/a)  Bridge Therapy Start Date: 06/06/17  Days of Overlap Therapy: 2   INR Value Greater than 2 Prior to Discontinuation of Parenteral Anticoagulation: Not Applicable     Reversal Agent Administered: Not Applicable  Comments: INR subtherapeutic and relatively unchanged as expected given pt has received only one dose of warfarin. Start 4mg daily with INR check tomorrow. No new DDI. H/H is stable with no indication of bleeding noted.     Plan:  Warfarin 4mg daily with INR check tomorrow  Education Material Provided?: No  Pharmacist suggested discharge dosing: Warfarin 4mg daily with close f/u within 3 days       Sharmin Torres, PharmD.  Pharmacy Practice Resident, PGY1

## 2017-06-07 NOTE — PROGRESS NOTES
HD treatment today per routine order using RUAAVF.SBP dropped in the 80's- tend to drop in the last hour of treatment,NS boluses given.Dr Najjar notified.Net UF removed 1800 ml.Epogen given.Report given to primary Rn.

## 2017-06-07 NOTE — DISCHARGE PLANNING
[facility] received a call from  on floor Don. Per the request of the  the referral has been sent to [facility]

## 2017-06-07 NOTE — DISCHARGE PLANNING
LTAC order received.  Discussed with family who reside in [facility]. Agreed to referrals to get patient as close to home as possible for continued care.     Follow up Referrals to [facility] and [facility].     Call received from [facility] after hours.  Pateint accepted to [facility].  Discussed with hospitalist.  All clinicals faxed to [facility] admissions Yuliana [phone].    Fax [phone]    Called [facility] Address: [address], [place] [address] Phone: [phone].  No answer.

## 2017-06-07 NOTE — CARE PLAN
Problem: Safety  Goal: Will remain free from injury  Patient NPO per nursing judgement. Speech therapy to bedside, patient failed swallow evaluation. Diabetisource running at 55ml/hr.    Problem: Mobility  Goal: Risk for activity intolerance will decrease  Patient up to commode with RN. General weakness, 2 assist. Patient up to chair with PT and RN. Tolerated well, tires quickly.

## 2017-06-07 NOTE — CARE PLAN
Problem: Safety  Goal: Free from accidental injury  Outcome: PROGRESSING AS EXPECTED  Pt able to mobilize without incident; Pt remains free from accidental injury        Problem: Psychosocial needs  Goal: Cultural needs incorporated in hospitalization  Outcome: PROGRESSING AS EXPECTED  Collaboration with family at bedside to ensure Pt's cultural needs incorporated into hospitalization

## 2017-06-07 NOTE — DISCHARGE PLANNING
Met with Son bedside and discussed [place], [place] acceptance.  Discussed referral sent to [facility] in [place] but CN team unable to reach admissions at [facility].   Son stated his sister would be calling me to discuss other hospitals in the [place] area specifically [place], [place].  Call from [phone] and discussed [facility] acceptance.  I explained [facility] would be considered a lateral transfer and insurance would not cover and transportation would be out of pocket.     Called Lately at [facility] admissions and they have available female bed.  Called [facility] to fax clinicals for review.  Discussed Referral to [facility] [address] [facility], [facility] [address] Phone: [phone] Fax: [phone].  Call to DTR and updated to current referrals  To both [facility] and [facility]. [facility] ready to accept patient today.   DTR stated their is family in the [place] area and she would gail to speak with her brother about final choice.      Call to [facility] admissions Yuliana [phone].    Fax [phone] and faxed updated Micros, MAR, and Blood sugars.      Plan: Waiting for call back form family with final decision on LTAC location in [place].

## 2017-06-07 NOTE — DISCHARGE PLANNING
Advance Beneficiary Notice of Non-Coverage ABN signed by spouse and faxed to [facility].  Dr Castaneda signed NEMT form and faxed back to [facility].  Fax confirmation received.   Copy faxed back to [facility] for records.     Called [facility] transport and patient is confirmed for 1845  to [facility]. Dr Pepe accepting.  Room 100.  [address] [place]

## 2017-06-07 NOTE — PROGRESS NOTES
Nephrology Progress Note, Adult, Complex               Author: Fadi Najjar   Date & Time created: 6/7/2017  10:32 AM   Interval History:  67 y/o female with ESRD/HD, s/p cardiac arrest  Seen and examined while getting HD.      Review of Systems:  Review of Systems   Constitutional: Negative for fever and weight loss.   Respiratory: Negative for cough and shortness of breath.    Cardiovascular: Negative for chest pain and leg swelling.   Gastrointestinal: Negative for nausea and vomiting.   Genitourinary: Negative for dysuria and urgency.       Physical Exam:  Physical Exam   Constitutional: She is oriented to person, place, and time. She appears well-developed and well-nourished. She appears ill. No distress.   HENT:   Head: Normocephalic and atraumatic.   Nose: Nose normal.   Eyes: Pupils are equal, round, and reactive to light. Right eye exhibits no discharge. Left eye exhibits no discharge. No scleral icterus.   Cardiovascular: Normal rate and regular rhythm.  Exam reveals no gallop and no friction rub.    Pulmonary/Chest: She has no wheezes. She has no rales.   Abdominal: Soft. Bowel sounds are normal. She exhibits no distension.   Musculoskeletal: She exhibits no edema.   Neurological: She is alert and oriented to person, place, and time.   Skin: She is not diaphoretic.   Nursing note and vitals reviewed.      Labs:        Invalid input(s): [id number]      Recent Labs      06/05/17 0400 06/06/17 0059 06/07/17   0445   SODIUM  129*  132*  131*   POTASSIUM  4.7  4.4  5.3   CHLORIDE  88*  93*  92*   CO2  30  31  26   BUN  74*  46*  91*   CREATININE  4.88*  3.28*  5.09*   CALCIUM  9.5  9.0  8.8     Recent Labs      06/05/17   0400  06/06/17   0059  06/07/17   0445   ALTSGPT  30   --    --    ASTSGOT  20   --    --    ALKPHOSPHAT  84   --    --    TBILIRUBIN  0.7   --    --    PREALBUMIN  23.0   --    --    GLUCOSE  105*  159*  141*     Recent Labs      06/05/17   0400  06/06/17   0059  06/06/17   1300   17   0445   RBC  2.98*  3.14*   --   2.89*   HEMOGLOBIN  8.1*  8.5*   --   8.0*   HEMATOCRIT  25.6*  26.9*   --   25.3*   PLATELETCT  191  196   --   155*   PROTHROMBTM   --    --   14.3  14.0   APTT  62.0*  67.7*  69.5*  34.5   INR   --    --   1.08  1.05     Recent Labs      17   0400  17   0059  17   0445   WBC  7.9  8.1  8.9   NEUTSPOLYS  65.40   --   68.60   LYMPHOCYTES  21.80*   --   22.50   MONOCYTES  8.50   --   6.10   EOSINOPHILS  1.60   --   1.70   BASOPHILS  0.30   --   0.30   ASTSGOT  20   --    --    ALTSGPT  30   --    --    ALKPHOSPHAT  84   --    --    TBILIRUBIN  0.7   --    --            Hemodynamics:  Temp (24hrs), Av.4 °C (97.5 °F), Min:35.9 °C (96.6 °F), Max:37.8 °C (100 °F)  Temperature: 35.9 °C (96.6 °F), Monitored Temp: 37.5 °C (99.5 °F)  Pulse  Av.5  Min: 40  Max: 216Heart Rate (Monitored): 78  NIBP: 141/53 mmHg     Respiratory:    Respiration: 19, Pulse Oximetry: 99 %     Work Of Breathing / Effort: Mild  RUL Breath Sounds: Diminished, RML Breath Sounds: Diminished, RLL Breath Sounds: Diminished, DEV Breath Sounds: Diminished, LLL Breath Sounds: Diminished  Fluids:    Intake/Output Summary (Last 24 hours) at 17 1032  Last data filed at 17 1000   Gross per 24 hour   Intake 2141.13 ml   Output   2065 ml   Net  76.13 ml     Weight: 54 kg (119 lb 0.8 oz) (bed re-zeroed before Pt got back to bed)  GI/Nutrition:  No orders of the defined types were placed in this encounter.     Medical Decision Making, by Problem:  Active Hospital Problems    Diagnosis   • Cardiac arrest (CMS-HCC) [I46.9]   • Atrial fibrillation (CMS-HCC) [I48.91]   • ESRD (end stage renal disease) (CMS-HCC) [N18.6]   • Acute respiratory failure with hypoxia (CMS-HCC) [J96.01]   • HTN (hypertension) [I10]   • IDDM (insulin dependent diabetes mellitus) (CMS-HCC) [E11.9, Z79.4]   • Dyslipidemia [E78.5]   • Cardiomyopathy (CMS-HCC) [I42.9]   • Anemia [D64.9]   • Elevated troponin [R74.8]        Medications reviewed and Labs reviewed                      Assessment and Plan  1.ESRD/HD  2.HTN: BP controlled  3.Electrolytes: well controlled.  4.Anemia: Hgb 8.0mg/dl  5.S/p cardiac arrest: medical treatment   6.Volume:well controlled with UF/HD    HD today  Epo with dialysis  Renal dose all meds  Avoid nephrotoxins  Prognosis guarded.  D/W Dr Castaneda

## 2017-06-07 NOTE — PROGRESS NOTES
Cardiology Progress Note               Author: Eric Dempsey Date & Time created: 2017  4:40 PM     Interval History:  Cath films reviewed with family  No issues overnight  Awaiting transfer to [place]    Review of Systems   Unable to perform ROS      Physical Exam   Constitutional: She appears well-developed and well-nourished. No distress.   HENT:   Head: Normocephalic.   Mouth/Throat: Oropharynx is clear and moist.   Eyes: EOM are normal. Pupils are equal, round, and reactive to light. Right eye exhibits no discharge. Left eye exhibits no discharge. No scleral icterus.   Neck: Normal range of motion. Neck supple. No JVD present. No tracheal deviation present.   Cardiovascular: Normal rate, regular rhythm, S1 normal, S2 normal, normal heart sounds, intact distal pulses and normal pulses.  Exam reveals no gallop, no S3, no S4 and no friction rub.    No murmur heard.   No systolic murmur is present    No diastolic murmur is present   Pulses:       Carotid pulses are 2+ on the right side, and 2+ on the left side.       Radial pulses are 2+ on the right side, and 2+ on the left side.        Dorsalis pedis pulses are 2+ on the right side, and 2+ on the left side.        Posterior tibial pulses are 2+ on the right side, and 2+ on the left side.   Pulmonary/Chest: Effort normal and breath sounds normal. No respiratory distress. She has no wheezes. She has no rales.   Abdominal: Soft. Bowel sounds are normal. She exhibits no distension and no mass. There is no tenderness. There is no rebound and no guarding.   Musculoskeletal: She exhibits no edema.   Neurological: She is alert. No cranial nerve deficit.   Skin: Skin is warm and dry. She is not diaphoretic. No pallor.   Nursing note and vitals reviewed.      Hemodynamics:  Temp (24hrs), Av.1 °C (97 °F), Min:35.9 °C (96.6 °F), Max:36.4 °C (97.6 °F)  Temperature: 36.4 °C (97.6 °F)  Pulse  Av.7  Min: 40  Max: 216Heart Rate (Monitored): 83  NIBP: 151/43  mmHg     Respiratory:    Respiration: (!) 23, Pulse Oximetry: 93 %     Work Of Breathing / Effort: Mild  RUL Breath Sounds: Diminished, RML Breath Sounds: Diminished, RLL Breath Sounds: Diminished, DEV Breath Sounds: Diminished, LLL Breath Sounds: Diminished  Fluids:  Date 06/07/17 0700 - 06/08/17 0659   Shift 5871-8138 1384-4258 3603-7857 24 Hour Total   I  N  T  A  K  E   I.V. 165.1   165.1    Other 240   240    Enteral 560   560    Shift Total 965.1   965.1   O  U  T  P  U  T   Dialysis 1800   1800    Shift Total 1800   1800   Weight (kg) 54 54 54 54       Weight: 54 kg (119 lb 0.8 oz) (bed re-zeroed before Pt got back to bed)  GI/Nutrition:  No orders of the defined types were placed in this encounter.     Lab Results:  Recent Labs      06/05/17   0400  06/06/17   0059 06/07/17   0445   WBC  7.9  8.1  8.9   RBC  2.98*  3.14*  2.89*   HEMOGLOBIN  8.1*  8.5*  8.0*   HEMATOCRIT  25.6*  26.9*  25.3*   MCV  85.9  85.7  87.5   MCH  27.2  27.1  27.7   MCHC  31.6*  31.6*  31.6*   RDW  54.6*  54.3*  57.0*   PLATELETCT  191  196  155*   MPV  10.5  9.9  10.2     Recent Labs      06/05/17   0400  06/06/17   0059  06/07/17   0445   SODIUM  129*  132*  131*   POTASSIUM  4.7  4.4  5.3   CHLORIDE  88*  93*  92*   CO2  30  31  26   GLUCOSE  105*  159*  141*   BUN  74*  46*  91*   CREATININE  4.88*  3.28*  5.09*   CALCIUM  9.5  9.0  8.8     Recent Labs      06/06/17   1300  06/07/17   0408  06/07/17   0445   APTT  69.5*  114.8*  34.5   INR  1.08   --   1.05                     Medical Decision Making, by Problem:  Active Hospital Problems    Diagnosis   • Cardiac arrest (CMS-HCC) [I46.9]   • Atrial fibrillation (CMS-HCC) [I48.91]   • ESRD (end stage renal disease) (CMS-HCC) [N18.6]   • Acute respiratory failure with hypoxia (CMS-[facility]) [J96.01]   • Delirium [R41.0]   • Aspiration pneumonia (CMS-[facility]) [J69.0]   • HTN (hypertension) [I10]   • IDDM (insulin dependent diabetes mellitus) (CMS-[facility]) [E11.9, Z79.4]   • Dyslipidemia [E78.5]    • Cardiomyopathy (CMS-HCC) [I42.9]   • Anemia [D64.9]   • Elevated troponin [R74.8]       Plan:  65 y/o F with s/p arrest, a-fib with cardiomyopathy.  Cath showed occluded LCx but no focal stenosis in other vessels with small diabetic vessels.  She is progressing well, we will continue to optimize her meds.    1. CHFrEF    - cont coreg 6.25 BID    - cont lisinopril 2.5 today    - hold meagan given ESRD    - asa 81    - atorva 40    2. A-fib    - cont amio    3. S/P arrest    - PEA    4. NSTEMI    - atorva per above    - asa per above    EKG reviewed, Medications reviewed, Radiology images reviewed and Labs reviewed  Patricia catheter: No Patricia        DVT prophylaxis - mechanical: SCDs  Ulcer prophylaxis: No

## 2017-06-07 NOTE — PROGRESS NOTES
"Family at bedside, concerned about Pt \"not sleeping last night\" and saying things that make them think she is \"hallucinating.\" Per family as interpreters, Pt is oriented to self and situation.  Dr. Velazquez updated regarding Family's concerns.  Orders received, see MAR.    "

## 2017-06-07 NOTE — DISCHARGE PLANNING
Call back from Yuliana at [facility] in [place].  Patient accepted today and they have bed available.  Dr Avinash Pepe accepting. [phone]  Notified Hospitalist for Peer to Peer. Peer to Peer completed. Faxed DC summary to [phone].  [facility]  Bed 100.   Transfer packet completed.  Transport forms completed faxed to [facility]. 14:39.  Requesting 1600 pickup time.   Notified family.  Spouse requesting to ride with patient. 190 LBS.

## 2017-06-07 NOTE — DISCHARGE PLANNING
[facility] called [place] at [place] tel# [phone]. [facility] unable to leave a message or talk to a rep. The phone just keeps ringing. Sw on floor Don has been notified.

## 2017-06-07 NOTE — PROGRESS NOTES
Pulmonary Critical Care Progress Note      Date of service: 6/7/2017    Chief Complaint: Cardiac Arrest    History of Present Illness: Patient is a 67 y/o F, history of end stage renal disease, Right AV fistula, chronic HD, DM, admitted through the ED on 5/27/2017 after out of hospital cardiac arrest asystole with ROSC after two rounds of epi. Intubated remains on full ventilator support but now awake and following commands in [place].     ROS:    Respiratory: negative,   Cardiac: negative,   GI: negative.        Interval Events:  24 hour interval history reviewed     Dialysis this morning  Multi vessel coronary disease noted with cath 6/5 7 day Zyvox completed 6/5  2L NC  Heparin gtt  No CXR today      PFSH:  No change.      Physical Exam  General: no distress  Neuro/Psych:  Following commands.  Mild facial resting tremor, unchanged. Moving all extremities symmetrically.   HEENT: PERRL. Supple, midline, no crepitus.. Mucous membranes are pink and moist.   CVS: Distant, RRR.   Respiratory:   No crackles.    Abdomen/:  Soft. NT. Bowel sounds t present.   Extremities: Distal pulses 2+ bilaterally. Trace lower extremity edema. R  Skin: Cool, dry, perfused. Capillary refill intact.       Respiratory:     Pulse Oximetry: 99 %  ImagingAvailable data reviewed         Invalid input(s): [id number]       HemoDynamics:  Pulse: 62, Heart Rate (Monitored): 62  NIBP: (!) 123/38 mmHg    Imaging: Available data reviewed       ECHO 5/27: CONCLUSIONS  No prior study is available for comparison.   Normal left ventricular chamber size. Normal left ventricular wall thickness. Moderately reduced left ventricular systolic function. Left ventricular ejection fraction is visually estimated to be 35%. Normal   regional wall motion.   Grade II diastolic dysfunction.  Moderately dilated left atrium.  Moderate mitral regurgitation.  Estimated right ventricular systolic pressure is at least 45 mmHg.  IVC not visualized.      Neuro:  GCS  Total Reed Coma Score: 15  Imaging: Available data reviewed    Fluids:  Intake/Output       06/05/17 0700 - 06/06/17 0659 06/06/17 0700 - 06/07/17 0659 06/07/17 0700 - 06/08/17 0659      0700-1859 1900-0659 Total 0700-1859 1900-0659 Total 0700-1859 1900-0659 Total       Intake    P.O.  --  -- --  --  0 0  --  -- --    P.O. -- -- -- -- 0 0 -- -- --    I.V.  120  324 444  324  68 392  --  -- --    Heparin Volume -- 204 204 204 68 272 -- -- --    IV Volume 120 120 240 120 -- 120 -- -- --    Other  --  -- --  --  30 30  --  -- --    Medications (P.O./ Enteral Liquids) -- -- -- -- 30 30 -- -- --    Dialysis  --  500 500  --  -- --  --  -- --    Dialysis Volume (Dialysis Intake) -- 500 500 -- -- -- -- -- --    Enteral  180  720 900  550  430 980  --  -- --    Enteral Volume -- 660 660 550 330 880 -- -- --    Free Water / Tube Flush 180 60 240 -- 100 100 -- -- --    Total Intake 300 1544 1844  -- -- --       Output    Urine  345  35 380  150  -- 150  --  -- --    Indwelling Cathether 345 35 380 150 -- 150 -- -- --    Dialysis  --  2000 2000  --  -- --  --  -- --    Dialysis Output (Dialysis Output) -- 2000 2000 -- -- -- -- -- --    Stool  --  -- --  --  -- --  --  -- --    Number of Times Stooled -- -- -- -- 0 x 0 x -- -- --    Total Output 345 2035 2380 150 -- 150 -- -- --       Net I/O     -45 494 -995 676  -- -- --        Weight: 54 kg (119 lb 0.8 oz) (bed re-zeroed before Pt got back to bed)  Recent Labs      06/05/17 0400  06/06/17 0059 06/07/17 0445   SODIUM  129*  132*  131*   POTASSIUM  4.7  4.4  5.3   CHLORIDE  88*  93*  92*   CO2  30  31  26   BUN  74*  46*  91*   CREATININE  4.88*  3.28*  5.09*   CALCIUM  9.5  9.0  8.8     GI/Nutrition:  Imaging: Available data reviewed  NPO and tube feed Tolerated at 40.     Liver Function:  Recent Labs      06/05/17 0400 06/06/17 0059 06/07/17 0445   ALTSGPT  30   --    --    ASTSGOT  20   --    --    ALKPHOSPHAT  84   --    --     TBILIRUBIN  0.7   --    --    PREALBUMIN  23.0   --    --    GLUCOSE  105*  159*  141*     Heme:  Recent Labs      17   0400  17   0059  17   1300  17   0445   RBC  2.98*  3.14*   --   2.89*   HEMOGLOBIN  8.1*  8.5*   --   8.0*   HEMATOCRIT  25.6*  26.9*   --   25.3*   PLATELETCT  191  196   --   155*   PROTHROMBTM   --    --   14.3  14.0   APTT  62.0*  67.7*  69.5*  34.5   INR   --    --   1.08  1.05     Infectious Disease:  Temp  Av.5 °C (97.7 °F)  Min: 36 °C (96.8 °F)  Max: 37.8 °C (100 °F)  Monitored Temp  Av.7 °C (99.9 °F)  Min: 37.5 °C (99.5 °F)  Max: 37.9 °C (100.2 °F)  Micro: reviewed   Day  Zyvox.   Recent Labs      17   0400  17   0059  17   0445   WBC  7.9  8.1  8.9   NEUTSPOLYS  65.40   --   68.60   LYMPHOCYTES  21.80*   --   22.50   MONOCYTES  8.50   --   6.10   EOSINOPHILS  1.60   --   1.70   BASOPHILS  0.30   --   0.30   ASTSGOT  20   --    --    ALTSGPT  30   --    --    ALKPHOSPHAT  84   --    --    TBILIRUBIN  0.7   --    --      Current Facility-Administered Medications   Medication Dose Frequency Provider Last Rate Last Dose   • lisinopril (PRINIVIL) tablet 5 mg  5 mg Q DAY Eric Dempsey M.D.       • MD ALERT... warfarin (COUMADIN) per pharmacy protocol   pharmacy to dose Ricardo Castaneda M.D.       • warfarin (COUMADIN) tablet 4 mg  4 mg COUMADIN-DAILY Ricardo Castaneda M.D.       • risperidone (RISPERDAL) tablet 1 mg  1 mg HS PRN Dong Velazquez D.O.   1 mg at 17 0033   • insulin NPH (HUMULIN,NOVOLIN) injection 8 Units  8 Units BID INSULIN Macario Jo M.D.   8 Units at 17 0518   • lidocaine-prilocaine (EMLA) 2.5-2.5 % cream   DIALYSIS PRN Ricardo Petersen M.D.       • omeprazole 2 mg/mL in sodium bicarbonate (PRILOSEC) oral susp 40 mg  40 mg DAILY Carlos Manuel Man M.D.   40 mg at 17 0819   • oxycodone immediate-release (ROXICODONE) tablet 5 mg  5 mg Q6HRS PRN Carlos Manuel Man M.D.   5 mg at 17  2130   • prochlorperazine (COMPAZINE) injection 10 mg  10 mg Q6HRS PRN Jeremy M Gonda, M.D.   10 mg at 06/04/17 2349   • albumin human 25% solution 12.5 g  12.5 g Q HOUR PRN Ricardo Petersen M.D.   Stopped at 06/02/17 0715   • carvedilol (COREG) tablet 6.25 mg  6.25 mg BID WITH MEALS Carlos Manuel Man M.D.   6.25 mg at 06/06/17 1725   • insulin lispro (HUMALOG) injection 3-14 Units  3-14 Units Q6HRS Carlos Manuel Man M.D.   Stopped at 06/06/17 2347   • NS (BOLUS) infusion 250 mL  250 mL DIALYSIS PRN Ricardo Petersen M.D.       • epoetin micheal (EPOGEN,PROCRIT) injection 10,000 Units  10,000 Units MO  +  Ricardo Petersen M.D.   10,000 Units at 06/05/17 1930   • NS (BOLUS) infusion 250 mL  250 mL DIALYSIS PRN Ricardo Petersen M.D.       • heparin 1000 units/mL injection 2,600 Units  2,600 Units PRN Ricardo Castaneda M.D.   2,600 Units at 06/03/17 2153    And   • heparin infusion 25,000 units in 500 ml 0.45% nacl   Continuous Ricardo Castaneda M.D. 17 mL/hr at 06/06/17 1929 850 Units/hr at 06/06/17 1929   • aspirin EC (ECOTRIN) tablet 81 mg  81 mg DAILY Mulu WALESKA Bryant   81 mg at 06/06/17 0818   • atorvastatin (LIPITOR) tablet 40 mg  40 mg QHS Mulu WALESKA Bryant   40 mg at 06/06/17 2130   • amiodarone (CORDARONE) tablet 200 mg  200 mg TWICE DAILY Mulu Bryant M.D.   200 mg at 06/06/17 2100   • [START ON 6/13/2017] amiodarone (CORDARONE) tablet 200 mg  200 mg Q DAY Mulu WALESKA Bryant       • acetaminophen (TYLENOL) suppository 650 mg  650 mg Q6HRS PRN Dragan Martinez M.D.   650 mg at 05/28/17 0444   • Respiratory Care per Protocol   Continuous RT Dragan Martinez M.D.       • senna-docusate (PERICOLACE or SENOKOT S) 8.6-50 MG per tablet 2 Tab  2 Tab BID Dragan Martinez M.D.   2 Tab at 06/06/17 2130    And   • polyethylene glycol/lytes (MIRALAX) PACKET 1 Packet  1 Packet QDAY PRN Dragan Martinez M.D.   1 Packet at 05/30/17 2121    And   • magnesium hydroxide (MILK OF MAGNESIA) suspension 30 mL  30  mL QDAY PRN Dragan Martinez M.D.        And   • bisacodyl (DULCOLAX) suppository 10 mg  10 mg QDAY PRN Dragan Martinez M.D.       • lidocaine (XYLOCAINE) 1%  injection  1-2 mL Q30 MIN PRN Dragan Martinez M.D.   2 mL at 06/02/17 0028   • NS infusion   Continuous Dragan Martinez M.D. 10 mL/hr at 06/04/17 2038     • ipratropium-albuterol (DUONEB) nebulizer solution 3 mL  3 mL Q2HRS PRN (RT) Dragan Martinez M.D.       • enalaprilat (VASOTEC) injection 1.25 mg  1.25 mg Q6HRS PRN Dragan Sampson D.O.       • labetalol (NORMODYNE,TRANDATE) injection 10 mg  10 mg Q4HRS PRN Dragan Sampson D.O.       • ondansetron (ZOFRAN) syringe/vial injection 4 mg  4 mg Q4HRS PRN Dragan Sampson D.O.   4 mg at 06/05/17 1256   • ondansetron (ZOFRAN ODT) dispertab 4 mg  4 mg Q4HRS PRN Dragan Sampson D.O.   4 mg at 06/06/17 1111   • acetaminophen (TYLENOL) tablet 650 mg  650 mg Q6HRS PRN Dragan Sampson D.O.   650 mg at 06/06/17 0819   • glucose 4 g chewable tablet 16 g  16 g Q15 MIN PRN Dragan Sampson D.O.        And   • dextrose 50% (D50W) injection 25 mL  25 mL Q15 MIN PRN Dragan Sampson D.O.         This patient's medications have not been reviewed.    Quality  Measures:  Labs reviewed, Medications reviewed and Radiology images reviewed                    Assessment and Plan:  Ventilator-dependent respiratory failure.   - Intubated AM 5/27   - doing well after extubation    - IS, RT protocols  Abnormal CXR - RLL ATX/pna   - zyvox   Status post witnessed out-of-hospital asystolic cardiac arrest.    - Cardiology following- primary  Cardiac arrest/NSTEMI?/valvular heart Dz/myop   - Cath tomorrow    - correct lytes  Cardiomyopathy - EF 35%   - Grade II DD  PHTN - mild-moderate RVSP 45   - Secondary to MR? Moderate by ECHO  Encephalopathy   - improved; follows   - speaks Italian; family assists  Anemia with thrombocytopenia.   - Serial lab - transfuse PRN with restrictive threshold  End-stage renal disease, on  maintenance hemodialysis.   - Renal following   - HD daily  Diabetes mellitus - glycemic control  Enteral nutrrition - Cortrak  Mobilize  Prophylaxis   Working on transfer to LTAC      Discussed patient condition and risk of morbidity and/or mortality with Family, RN, RT, Pharmacy, Charge nurse / hot rounds and cardiology and nephrology.    The patient remains critically ill.  Critical care time = 31 minutes in directly providing and coordinating critical care and extensive data review.  No time overlap and excludes procedures.    IVanessa (Tad), am scribing for, and in the presence of, Carlos Manuel Man M.D.  Electronically signed by: Vanessa Mehta (Tad), 6/7/2017  ICarlos Manuel M.D. personally performed the services described in this documentation, as scribed by Vanessa Mehta in my presence, and it is both accurate and complete.

## 2017-06-07 NOTE — PROGRESS NOTES
12 hour chart check completed. Bedside report received from LEXI Carreon. Patient resting comfortable in bed with hemodialysis running. Call light within reach, family at bedside. Plan of care discussed.

## 2017-06-08 NOTE — PROGRESS NOTES
[facility] staff here to  patient. Report given. IV saline locked. Tube feeding stopped. Discharge summary went over with patient and family. Questions and concerns addressed and answered. Report given to staff at [facility].

## 2017-06-08 NOTE — DISCHARGE INSTRUCTIONS
Discharge Instructions    Discharged to other by ambulance with escort. Discharged via ambulance, hospital escort: Yes.  Special equipment needed: Oxygen    Be sure to schedule a follow-up appointment with your primary care doctor or any specialists as instructed.     Discharge Plan:   Diet Plan: Discussed  Activity Level: Discussed  Confirmed Follow up Appointment:  (discharge to LT)  Confirmed Symptoms Management: Discussed  Medication Reconciliation Updated: Yes  Influenza Vaccine Indication: Indicated: Not available from distributor/, Patient Refuses    I understand that a diet low in cholesterol, fat, and sodium is recommended for good health. Unless I have been given specific instructions below for another diet, I accept this instruction as my diet prescription.   Other diet: tube feed/ to be followed up on    Special Instructions:   HF Patient Discharge Instructions  · Monitor your weight daily, and maintain a weight chart, to track your weight changes.   · Activity as tolerated, unless your Doctor has ordered otherwise. Other activity order: as tolerated.  · Follow a low fat, low cholesterol, low salt diet unless instructed otherwise by your Doctor. Read the labels on the back of food products and track your intake of fat, cholesterol and salt.   · Fluid Restriction No. If a Fluid Restriction has been ordered by your Doctor, measure fluids with a measuring cup to ensure that you are not exceeding the restriction.   · No smoking.  · Oxygen Yes. If your Doctor has ordered that you wear Oxygen at home, it is important to wear it as ordered.  · Did you receive an explanation from staff on the importance of taking each of your medications and why it is necessary to keep taking them unless your doctor says to stop?   · Were all of your questions answered about how to manage your heart failure and what to do if you have increased signs and symptoms after you go home?   · Do you feel like your heart  failure care team involved you in the care treatment plan and allowed you to make decisions regarding your care while in the hospital and addressed any discharge needs you might have?     See the educational handout provided at discharge for more information on monitoring your daily weight, activity and diet. This also explains more about Heart Failure, symptoms of a flare-up and some of the tests that you have undergone.     Warning Signs of a Flare-Up include:  · Swelling in the ankles or lower legs.  · Shortness of breath, while at rest, or while doing normal activities.   · Shortness of breath at night when in bed, or coughing in bed.   · Requiring more pillows to sleep at night, or needing to sit up at night to sleep.  · Feeling weak, dizzy or fatigued.     When to call your Doctor:  · Call [facility] seven days a week from 8:00 a.m. to 8:00 p.m. for medical questions [phone].  · Call your Primary Care Physician or Cardiologist if:   1. You experience any pain radiating to your jaw or neck.  2. You have any difficulty breathing.  3. You experience weight gain of 3 lbs in a day or 5 lbs in a week.   4. You feel any palpitations or irregular heartbeats.  5. You become dizzy or lose consciousness.   If you have had an angiogram or had a pacemaker or AICD placed, and experience:  1. Bleeding, drainage or swelling at the surgical / puncture site.  2. Fever greater than 100.0 F  3. Shock from internal defibrillator.  4. Cool and / or numb extremities.      · Is patient discharged on Warfarin / Coumadin?   Yes    You are receiving the drug warfarin. Please understand the importance of monitoring warfarin with scheduled PT/INR blood draws.  Follow-up with [facility] .    IMPORTANT: HOW TO USE THIS INFORMATION:  This is a summary and does NOT have all possible information about this product. This information does not assure that this product is safe, effective, or appropriate for you. This information is not  "individual medical advice and does not substitute for the advice of your health care professional. Always ask your health care professional for complete information about this product and your specific health needs.      WARFARIN - ORAL (WARF-uh-rin)      COMMON BRAND NAME(S): Coumadin      WARNING:  Warfarin can cause very serious (possibly fatal) bleeding. This is more likely to occur when you first start taking this medication or if you take too much warfarin. To decrease your risk for bleeding, your doctor or other health care provider will monitor you closely and check your lab results (INR test) to make sure you are not taking too much warfarin. Keep all medical and laboratory appointments. Tell your doctor right away if you notice any signs of serious bleeding. See also Side Effects section.      USES:  This medication is used to treat blood clots (such as in deep vein thrombosis-DVT or pulmonary embolus-PE) and/or to prevent new clots from forming in your body. Preventing harmful blood clots helps to reduce the risk of a stroke or heart attack. Conditions that increase your risk of developing blood clots include a certain type of irregular heart rhythm (atrial fibrillation), heart valve replacement, recent heart attack, and certain surgeries (such as hip/knee replacement). Warfarin is commonly called a \"blood thinner,\" but the more correct term is \"anticoagulant.\" It helps to keep blood flowing smoothly in your body by decreasing the amount of certain substances (clotting proteins) in your blood.      HOW TO USE:  Read the Medication Guide provided by your pharmacist before you start taking warfarin and each time you get a refill. If you have any questions, ask your doctor or pharmacist. Take this medication by mouth with or without food as directed by your doctor or other health care professional, usually once a day. It is very important to take it exactly as directed. Do not increase the dose, take it more " frequently, or stop using it unless directed by your doctor. Dosage is based on your medical condition, laboratory tests (such as INR), and response to treatment. Your doctor or other health care provider will monitor you closely while you are taking this medication to determine the right dose for you. Use this medication regularly to get the most benefit from it. To help you remember, take it at the same time each day. It is important to eat a balanced, consistent diet while taking warfarin. Some foods can affect how warfarin works in your body and may affect your treatment and dose. Avoid sudden large increases or decreases in your intake of foods high in vitamin K (such as broccoli, cauliflower, cabbage, brussels sprouts, kale, spinach, and other green leafy vegetables, liver, green tea, certain vitamin supplements). If you are trying to lose weight, check with your doctor before you try to go on a diet. Cranberry products may also affect how your warfarin works. Limit the amount of cranberry juice (16 ounces/480 milliliters a day) or other cranberry products you may drink or eat.      SIDE EFFECTS:  Nausea, loss of appetite, or stomach/abdominal pain may occur. If any of these effects persist or worsen, tell your doctor or pharmacist promptly. Remember that your doctor has prescribed this medication because he or she has judged that the benefit to you is greater than the risk of side effects. Many people using this medication do not have serious side effects. This medication can cause serious bleeding if it affects your blood clotting proteins too much (shown by unusually high INR lab results). Even if your doctor stops your medication, this risk of bleeding can continue for up to a week. Tell your doctor right away if you have any signs of serious bleeding, including: unusual pain/swelling/discomfort, unusual/easy bruising, prolonged bleeding from cuts or gums, persistent/frequent nosebleeds, unusually  heavy/prolonged menstrual flow, pink/dark urine, coughing up blood, vomit that is bloody or looks like coffee grounds, severe headache, dizziness/fainting, unusual or persistent tiredness/weakness, bloody/black/tarry stools, chest pain, shortness of breath, difficulty swallowing. Tell your doctor right away if any of these unlikely but serious side effects occur: persistent nausea/vomiting, severe stomach/abdominal pain, yellowing eyes/skin. This drug rarely has caused very serious (possibly fatal) problems if its effects lead to small blood clots (usually at the beginning of treatment). This can lead to severe skin/tissue damage that may require surgery or amputation if left untreated. Patients with certain blood conditions (protein C or S deficiency) may be at greater risk. Get medical help right away if any of these rare but serious side effects occur: painful/red/purplish patches on the skin (such as on the toe, breast, abdomen), change in the amount of urine, vision changes, confusion, slurred speech, weakness on one side of the body. A very serious allergic reaction to this drug is rare. However, get medical help right away if you notice any symptoms of a serious allergic reaction, including: rash, itching/swelling (especially of the face/tongue/throat), severe dizziness, trouble breathing. This is not a complete list of possible side effects. If you notice other effects not listed above, contact your doctor or pharmacist. In the [place] - Call your doctor for medical advice about side effects. You may report side effects to FDA at 1-762-HKJ-2873. In [place] - Call your doctor for medical advice about side effects. You may report side effects to [facility] [place] at [phone].      PRECAUTIONS:  Before taking warfarin, tell your doctor or pharmacist if you are allergic to it; or if you have any other allergies. This product may contain inactive ingredients, which can cause allergic reactions or other problems. Talk  to your pharmacist for more details. Before using this medication, tell your doctor or pharmacist your medical history, especially of: blood disorders (such as anemia, hemophilia), bleeding problems (such as bleeding of the stomach/intestines, bleeding in the brain), blood vessel disorders (such as aneurysms), recent major injury/surgery, liver disease, alcohol use, mental/mood disorders (including memory problems), frequent falls/injuries. It is important that all your doctors and dentists know that you take warfarin. Before having surgery or any medical/dental procedures, tell your doctor or dentist that you are taking this medication and about all the products you use (including prescription drugs, nonprescription drugs, and herbal products). Avoid getting injections into the muscles. If you must have an injection into a muscle (for example, a flu shot), it should be given in the arm. This way, it will be easier to check for bleeding and/or apply pressure bandages. This medication may cause stomach bleeding. Daily use of alcohol while using this medicine will increase your risk for stomach bleeding and may also affect how this medication works. Limit or avoid alcoholic beverages. If you have not been eating well, if you have an illness or infection that causes fever, vomiting, or diarrhea for more than 2 days, or if you start using any antibiotic medications, contact your doctor or pharmacist immediately because these conditions can affect how warfarin works. This medication can cause heavy bleeding. To lower the chance of getting cut, bruised, or injured, use great caution with sharp objects like safety razors and nail cutters. Use an electric razor when shaving and a soft toothbrush when brushing your teeth. Avoid activities such as contact sports. If you fall or injure yourself, especially if you hit your head, call your doctor immediately. Your doctor may need to check you. The Food & Drug Administration has  "stated that generic warfarin products are interchangeable. However, consult your doctor or pharmacist before switching warfarin products. Be careful not to take more than one medication that contains warfarin unless specifically directed by the doctor or health care provider who is monitoring your warfarin treatment. Older adults may be at greater risk for bleeding while using this drug. This medication is not recommended for use during pregnancy because of serious (possibly fatal) harm to an unborn baby. Discuss the use of reliable forms of birth control with your doctor. If you become pregnant or think you may be pregnant, tell your doctor immediately. If you are planning pregnancy, discuss a plan for managing your condition with your doctor before you become pregnant. Your doctor may switch the type of medication you use during pregnancy. Very small amounts of this medication may pass into breast milk but is unlikely to harm a nursing infant. Consult your doctor before breast-feeding.      DRUG INTERACTIONS:  Drug interactions may change how your medications work or increase your risk for serious side effects. This document does not contain all possible drug interactions. Keep a list of all the products you use (including prescription/nonprescription drugs and herbal products) and share it with your doctor and pharmacist. Do not start, stop, or change the dosage of any medicines without your doctor's approval. Warfarin interacts with many prescription, nonprescription, vitamin, and herbal products. This includes medications that are applied to the skin or inside the vagina or rectum. The interactions with warfarin usually result in an increase or decrease in the \"blood-thinning\" (anticoagulant) effect. Your doctor or other health care professional should closely monitor you to prevent serious bleeding or clotting problems. While taking warfarin, it is very important to tell your doctor or pharmacist of any " changes in medications, vitamins, or herbal products that you are taking. Some products that may interact with this drug include: capecitabine, imatinib, mifepristone. Aspirin, aspirin-like drugs (salicylates), and nonsteroidal anti-inflammatory drugs (NSAIDs such as ibuprofen, naproxen, celecoxib) may have effects similar to warfarin. These drugs may increase the risk of bleeding problems if taken during treatment with warfarin. Carefully check all prescription/nonprescription product labels (including drugs applied to the skin such as pain-relieving creams) since the products may contain NSAIDs or salicylates. Talk to your doctor about using a different medication (such as acetaminophen) to treat pain/fever. Low-dose aspirin and related drugs (such as clopidogrel, ticlopidine) should be continued if prescribed by your doctor for specific medical reasons such as heart attack or stroke prevention. Consult your doctor or pharmacist for more details. Many herbal products interact with warfarin. Tell your doctor before taking any herbal products, especially bromelains, coenzyme Q10, cranberry, danshen, dong quai, fenugreek, garlic, ginkgo biloba, ginseng, and Ayo's wort, among others. This medication may interfere with a certain laboratory test to measure theophylline levels, possibly causing false test results. Make sure laboratory personnel and all your doctors know you use this drug.      OVERDOSE:  If overdose is suspected, contact a poison control center or emergency room immediately. [place] residents can call the [place] National Poison Hotline at 1-483.363.8505. [place] residents can call a provincial poison control center. Symptoms of overdose may include: bloody/black/tarry stools, pink/dark urine, unusual/prolonged bleeding.      NOTES:  Do not share this medication with others. Laboratory and/or medical tests (such as INR, complete blood count) must be performed periodically to monitor your progress or check for  side effects. Consult your doctor for more details.      MISSED DOSE:  For the best possible benefit, do not miss any doses. If you do miss a dose and remember on the same day, take it as soon as you remember. If you remember on the next day, skip the missed dose and resume your usual dosing schedule. Do not double the dose to catch up because this could increase your risk for bleeding. Keep a record of missed doses to give to your doctor or pharmacist. Contact your doctor or pharmacist if you miss 2 or more doses in a row.      STORAGE:  Store at room temperature away from light and moisture. Do not store in the bathroom. Keep all medications away from children and pets. Do not flush medications down the toilet or pour them into a drain unless instructed to do so. Properly discard this product when it is  or no longer needed. Consult your pharmacist or local waste disposal company for more details about how to safely discard your product.      MEDICAL ALERT:  Your condition and medication can cause complications in a medical emergency. For information about enrolling in MedicAlert, call 1-346.576.6751 ([place]) or 1-548.639.7472 ([place]).      Information last revised 2010 Copyright(c) 2010 [employer]             · Is patient Post Blood Transfusion?  No    Depression / Suicide Risk    As you are discharged from this [facility] facility, it is important to learn how to keep safe from harming yourself.    Recognize the warning signs:  · Abrupt changes in personality, positive or negative- including increase in energy   · Giving away possessions  · Change in eating patterns- significant weight changes-  positive or negative  · Change in sleeping patterns- unable to sleep or sleeping all the time   · Unwillingness or inability to communicate  · Depression  · Unusual sadness, discouragement and loneliness  · Talk of wanting to die  · Neglect of personal appearance   · Rebelliousness- reckless  behavior  · Withdrawal from people/activities they love  · Confusion- inability to concentrate     If you or a loved one observes any of these behaviors or has concerns about self-harm, here's what you can do:  · Talk about it- your feelings and reasons for harming yourself  · Remove any means that you might use to hurt yourself (examples: pills, rope, extension cords, firearm)  · Get professional help from the community (Mental Health, Substance Abuse, psychological counseling)  · Do not be alone:Call your Safe Contact- someone whom you trust who will be there for you.  · Call your local CRISIS HOTLINE 762-5895 or 093-709-1638  · Call your local Children's Mobile Crisis Response Team [place] (378) 144-8639 or www.Flavourly  · Call the toll free National Suicide Prevention Hotlines   · National Suicide Prevention Lifeline 551-706-DAJF (0057)  · National Hope Line Network 800-SUICIDE (455-2775)

## 2017-06-08 NOTE — PROGRESS NOTES
Monitor Summary    NSR at beginning of shift, converted to Afib/Aflutter. HR 70-80s.    .16/.10/.38

## 2017-06-12 NOTE — DOCUMENTATION QUERY
DOCUMENTATION QUERY    PROVIDERS: Please select “Cosign w/ note”to reply to query.    To better represent the severity of illness of your patient, please review the following information and exercise your independent professional judgment in responding to this query.     CHFrEF is documented in the Progress Notes dated 6-5-17.  Elevated BNP of 988 documented in H&P, ejection fraction of 35%.  Based upon the clinical findings, risk factors, and treatment, can this diagnosis be further specified?    • Acute Systolic heart failure   • Chronic Systolic heart failure  • Acute on Chronic Systolic heart failure  • Other explanation of clinical findings (please document)  • Unable to determine         The medical record reflects the following:   Clinical Findings  Out of hospital cardiac arrest, NSTEMI, CAD, aspiration pneumonia, ESRD, HTN, DM, acute respiratory failure   Treatment  Antibiotics, cardiac cath, intubation   Risk Factors  ESRD, HTN, CAD, NSTEMI, aspiration pneumonia   Location within medical record  Progress notes     Thank you,   DENY Loaiza

## 2017-06-28 LAB
FUNGUS SPEC CULT: NORMAL
FUNGUS SPEC CULT: NORMAL
SIGNIFICANT IND 70042: NORMAL
SIGNIFICANT IND 70042: NORMAL
SITE SITE: NORMAL
SITE SITE: NORMAL
SOURCE SOURCE: NORMAL
SOURCE SOURCE: NORMAL

## 2017-07-23 LAB
MYCOBACTERIUM SPEC CULT: NORMAL
MYCOBACTERIUM SPEC CULT: NORMAL
RHODAMINE-AURAMINE STN SPEC: NORMAL
RHODAMINE-AURAMINE STN SPEC: NORMAL
SIGNIFICANT IND 70042: NORMAL
SIGNIFICANT IND 70042: NORMAL
SITE SITE: NORMAL
SITE SITE: NORMAL
SOURCE SOURCE: NORMAL
SOURCE SOURCE: NORMAL

## 2024-01-30 NOTE — ASSESSMENT & PLAN NOTE
With return of spontaneous circulation.   Angiogram results:  Findings:    LM patent.  LAD diffuse distal disease.  Circumflex: Proximal 100% with late filling. Small vessel.  RCA: Ostial 50%. Mid 50%. PDA very small vessel proximal diffuse 90%. Diffuse posterolateral disease   Yes

## 2024-10-02 NOTE — PROGRESS NOTES
Detail Level: [facility] Progress Note, Adult, Complex               Author: Ricardo Petersen   Date & Time created: 5/31/2017  9:40 AM   Interval History:  65 y/o female with ESRD/HD, s/p cardiac arrest  On HD MWF  Had additional HD for volume overload yesterday, now seen on HD    Review of Systems:  Review of Systems   Unable to perform ROS: intubated       Physical Exam:  Physical Exam   Constitutional: She appears well-developed and well-nourished. No distress. She is intubated.   HENT:   Head: Normocephalic and atraumatic.   ETT   Eyes: Pupils are equal, round, and reactive to light.   Cardiovascular: Normal rate and regular rhythm.  Exam reveals no gallop and no friction rub.    Pulmonary/Chest: She is intubated. She has no wheezes. She has no rales.   vented   Abdominal: Soft. Bowel sounds are normal. She exhibits no distension.   Musculoskeletal: She exhibits no edema.   Nursing note and vitals reviewed.      Labs:  Recent Labs      05/29/17 0447  05/30/17   0509  05/31/17   0538   ISTATAPH  7.411  7.489  7.475   ISTATAPCO2  40.1*  41.2*  45.0*   ISTATAPO2  77  58*  70   ISTATATCO2  27  33  34*   [id number]  95  92*  95   ISTATARTHCO3  25.5*  31.3*  33.1*   ISTATARTBE  1  7*  8*   ISTATTEMP  37.5 C  37.8 C  37.2 C   ISTATFIO2  45  45  45   ISTATSPEC  Arterial  Arterial  Arterial   ISTATAPHTC  7.403  7.477  7.472   [id number]  80  62*  71     Recent Labs      05/28/17   1100   TROPONINI  6.30*     Recent Labs      05/29/17   0440  05/30/17   0410  05/31/17   0415   SODIUM  138  137  134*   POTASSIUM  4.1  3.6  4.2   CHLORIDE  103  98  97   CO2  26  29  30   BUN  35*  24*  25*   CREATININE  4.71*  3.15*  2.74*   MAGNESIUM  2.1  1.9   --    PHOSPHORUS  4.0  3.0   --    CALCIUM  8.6  8.4*  9.1     Recent Labs      05/29/17   0440  05/30/17   0410  05/31/17   0415   ALTSGPT  25   --    --    ASTSGOT  21   --    --    ALKPHOSPHAT  51   --    --    TBILIRUBIN  0.9   --    --    PREALBUMIN  12.0*   --    --    GLUCOSE  104*   140*  156*     Recent Labs      17   04417   1140   17   04117   1235  17   1955  17   0150  17   0415   RBC  3.13*   --    --   3.00*   --    --    --    --   2.85*   HEMOGLOBIN  8.5*   --    --   8.2*   --    --    --    --   7.7*   HEMATOCRIT  27.3*   --    --   25.8*   --    --    --    --   24.3*   PLATELETCT  95*   --    --   107*   --    --    --    --   111*   PROTHROMBTM   --   16.4*   --    --    --    --    --    --    --    APTT   --   36.6*   < >   --    < >  88.2*  81.1*  71.0*   --    INR   --   1.28*   --    --    --    --    --    --    --     < > = values in this interval not displayed.     Recent Labs      17   WBC  10.7  10.2  7.2   NEUTSPOLYS  85.70*  84.00*  79.30*   LYMPHOCYTES  7.00*  9.50*  12.20*   MONOCYTES  4.80  4.30  6.10   EOSINOPHILS  1.50  1.20  1.10   BASOPHILS  0.50  0.30  0.30   ASTSGOT  21   --    --    ALTSGPT  25   --    --    ALKPHOSPHAT  51   --    --    TBILIRUBIN  0.9   --    --            Hemodynamics:  Temp (24hrs), Av.1 °C (98.8 °F), Min:37.1 °C (98.8 °F), Max:37.1 °C (98.8 °F)  Temperature: 37.1 °C (98.8 °F), Monitored Temp: 37.2 °C (99 °F)  Pulse  Av.9  Min: 48  Max: 109Heart Rate (Monitored): (!) 59  NIBP: 142/45 mmHg     Respiratory:  [place] Vent Mode: APVCMV, Rate (breaths/min): 16, PEEP/CPAP: 8, FiO2: 45, P Peak (PIP): 21, P MEAN: 12 Respiration: (!) 22, Pulse Oximetry: 96 %     Work Of Breathing / Effort: Vented  RUL Breath Sounds: Clear, RML Breath Sounds: Diminished, RLL Breath Sounds: Diminished, DEV Breath Sounds: Clear, LLL Breath Sounds: Diminished  Fluids:    Intake/Output Summary (Last 24 hours) at 17 0940  Last data filed at 17 0600   Gross per 24 hour   Intake 2659.8 ml   Output   2575 ml   Net   84.8 ml     Weight: 72.2 kg (159 lb 2.8 oz)  GI/Nutrition:  Orders Placed This Encounter   Procedures   • Diet NPO     Standing Status:  Standing      Number of Occurrences: 1      Standing Expiration Date:      Order Specific Question:  Restrict to:     Answer:  With Tube Feed [4]     Medical Decision Making, by Problem:  Active Hospital Problems    Diagnosis   • Cardiac arrest (CMS-HCC) [I46.9]   • Atrial fibrillation (CMS-HCC) [I48.91]   • ESRD (end stage renal disease) (CMS-HCC) [N18.6]   • Acute respiratory failure with hypoxia (CMS-HCC) [J96.01]   • HTN (hypertension) [I10]   • IDDM (insulin dependent diabetes mellitus) (CMS-HCC) [E11.9, Z79.4]   • Dyslipidemia [E78.5]   • Cardiomyopathy (CMS-HCC) [I42.9]   • Anemia [D64.9]   • Elevated troponin [R74.8]       Medications reviewed and Labs reviewed                      Assessment and Plan  1.ESRD/HD -MWF, HD today  2.HTN: BP well controlled  3.Electrolytes: well controlled.  4.Anemia: Hgb 8.2, epogen  5.S/p cardiac arrest  6.Volume:well controlled with UF/HD    HD MWF, additional tx as needed         Detail Level: Detailed